# Patient Record
Sex: FEMALE | Race: WHITE | NOT HISPANIC OR LATINO | Employment: OTHER | URBAN - NONMETROPOLITAN AREA
[De-identification: names, ages, dates, MRNs, and addresses within clinical notes are randomized per-mention and may not be internally consistent; named-entity substitution may affect disease eponyms.]

---

## 2017-01-12 ENCOUNTER — TELEPHONE (OUTPATIENT)
Dept: CARDIOLOGY | Facility: CLINIC | Age: 67
End: 2017-01-12

## 2017-01-12 DIAGNOSIS — Z79.899 ON LONG TERM DRUG THERAPY: Primary | ICD-10-CM

## 2017-01-12 RX ORDER — SPIRONOLACTONE 50 MG/1
50 TABLET, FILM COATED ORAL DAILY
Qty: 30 TABLET | Refills: 2 | Status: SHIPPED | OUTPATIENT
Start: 2017-01-12 | End: 2017-02-02

## 2017-01-12 NOTE — TELEPHONE ENCOUNTER
Patient contacted the office stating she has been taking aldactone 25 mg bid instead of daily. She states it seems to work better for her. Dr. lopez will increase aldactone to 50 mg 1 po daily. Ms. Baez was instructed to have a bmp in 1 week.

## 2017-01-19 LAB
ANION GAP SERPL CALCULATED.3IONS-SCNC: 9 MMOL/L (ref 5–15)
BUN SERPL-MCNC: 20 MG/DL (ref 7–21)
CALCIUM SERPL-MCNC: 9.2 MG/DL (ref 8.4–10.2)
CHLORIDE SERPL-SCNC: 93 MMOL/L (ref 95–110)
CO2 SERPL-SCNC: 30 MMOL/L (ref 22–31)
CREAT SERPL-MCNC: 0.8 MG/DL (ref 0.5–1)
GLUCOSE SERPL-MCNC: 90 MG/DL (ref 60–100)
POTASSIUM SERPL-SCNC: 5 MMOL/L (ref 3.5–5.1)
SODIUM SERPL-SCNC: 132 MMOL/L (ref 137–145)

## 2017-02-02 ENCOUNTER — OFFICE VISIT (OUTPATIENT)
Dept: CARDIOLOGY | Facility: CLINIC | Age: 67
End: 2017-02-02

## 2017-02-02 VITALS
OXYGEN SATURATION: 96 % | DIASTOLIC BLOOD PRESSURE: 82 MMHG | HEIGHT: 65 IN | SYSTOLIC BLOOD PRESSURE: 128 MMHG | WEIGHT: 146.1 LBS | BODY MASS INDEX: 24.34 KG/M2

## 2017-02-02 DIAGNOSIS — I10 ESSENTIAL HYPERTENSION: Primary | ICD-10-CM

## 2017-02-02 PROCEDURE — 99213 OFFICE O/P EST LOW 20 MIN: CPT | Performed by: INTERNAL MEDICINE

## 2017-02-02 RX ORDER — SPIRONOLACTONE 25 MG/1
25 TABLET ORAL DAILY
COMMUNITY
End: 2017-08-23 | Stop reason: SINTOL

## 2017-02-02 NOTE — PROGRESS NOTES
Cardiovascular Medicine   Nicola Carrillo M.D., Ph.D., [facility]          Hypertension       This is a 66 y.o. female with:      1. HTN    HTN  Concerning the patient's hypertension, the patient is currently taking Diltiazem.  The patient is medication compliant.  Denies side effects.  Patient's laboratory evaluations are followed closely by the PCP.  She was tried on Bystolic, Losartan, Lisinopril  And HCTZ. She has had rashes with these medications. Last time, we tried Aldactone. She is now on 25mg. Her BP has been good. She does complain of her hips and bilateral legs.     Review of Systems - History obtained from chart review and the patient  General ROS: negative  Cardiovascular ROS: no chest pain or dyspnea on exertion    family history includes Colon cancer in her other; Diabetes in her other; Heart disease in her other; Hypertension in her other.     reports that she has never smoked. She does not have any smokeless tobacco history on file. She reports that she does not drink alcohol.    Allergies   Allergen Reactions   • Bacitracin    • Benzodiazepines Confusion   • Bystolic [Nebivolol Hcl]    • Hctz [Hydrochlorothiazide]    • Lisinopril    • Losartan    • Flagyl [Metronidazole] Rash   • Sulfa Antibiotics Rash         Current Outpatient Prescriptions:   •  desoximetasone (TOPICORT) 0.25 % cream, , Disp: , Rfl:   •  diltiaZEM (CARDIZEM) 60 MG tablet, Take 1 tablet by mouth 4 (Four) Times a Day., Disp: 120 tablet, Rfl: 3  •  docusate sodium (COLACE) 100 MG capsule, Take 100 mg by mouth every night. Takes 4 cap nightly, Disp: , Rfl:   •  gabapentin (NEURONTIN) 300 MG capsule, , Disp: , Rfl:   •  LORazepam (ATIVAN) 0.5 MG tablet, Take 1 tablet by mouth Every 8 (Eight) Hours As Needed for anxiety., Disp: 30 tablet, Rfl: 2  •  Magnesium 250 MG tablet, Take 4 tablets by mouth., Disp: , Rfl:   •  spironolactone (ALDACTONE) 25 MG tablet, Take 25 mg by mouth Daily., Disp: , Rfl:     Physical Exam:  Vitals:     02/02/17 1322   BP: 128/82   SpO2: 96%     Body mass index is 24.31 kg/(m^2).    GEN: alert, appears stated age and cooperative  Body Habitus: well-nourished  HEENT: Head: Normocephalic, no lesions, without obvious abnormality.  Neck / Thyroid: Supple, no masses, nodes, nodules or enlargement. No arcus senilis, xanthelasma or xanthomas. PERRL. Normal external ears. No drainage. No thyromegaly. Neck supple. No LAD. Trachea midline. Nose, normal.  JVP: 6 cm of water at 45 degrees HJR: absent      Carotid:  Upstroke: easily palpated bilaterally Volume: Normal.    Carotid Bruit:  None  Subclavian Bruit: Not present.    Lymph: No overt LAD.   Back: Normal.  Chest:  Normal Excursion: Good    I:E: Good  Pulmonary:clear to auscultation, no wheezes, rales or rhonchi, symmetric air entry. Equal chest excursion. Chest physical exam is normal. No tenderness.        Precordium:  No palpable heaves or thrusts. P2 is not palpable.   [place]:  normal size and placement Palpable S4: Not present.   Heart rate: normal  Heart Rhythm: regular     Heart Sounds: S1: normal intensity  S2: normal intensity  S3: absent   S4: absent  Opening Snap: absent  A2-OS:  N/A  Pericardial rub: absent    Ejection click: None      Murmurs: Systolic: none  Diastolic: none    DATA REVIEWED:   EKG: normal EKG, normal sinus rhythm, dated today, interpreted today.    TTE, 2016  Normal LVEF, normal RV. PASP is normal.     Sodium 137 - 145 mmol/L 132 (L)   Potassium 3.5 - 5.1 mmol/L 5.0   Chloride 95 - 110 mmol/L 93 (L)   CO2 22 - 31 mmol/L 30   Anion Gap 5.0 - 15.0 mmol/L 9.0   Glucose 60 - 100 mg/dl 90   BUN 7 - 21 mg/dl 20   Creatinine 0.5 - 1.0 mg/dl 0.8   GFR MDRD Non African American 45 - 104 mL/min/1.73 sq.M 72         Assessment/Plan     1. HTN, essential.  HD BP noted to be well controlled today in office.    Continue current medications  Aldactone    2. Cardiac Risk Assessment: 11%  Consideration can be made for aspirin and statin for primary  prevention    3. Tobacco status: Life long non-smoker    4. Follow-up: PCP

## 2017-02-09 ENCOUNTER — OFFICE VISIT (OUTPATIENT)
Dept: FAMILY MEDICINE CLINIC | Facility: CLINIC | Age: 67
End: 2017-02-09

## 2017-02-09 VITALS
DIASTOLIC BLOOD PRESSURE: 92 MMHG | SYSTOLIC BLOOD PRESSURE: 130 MMHG | WEIGHT: 145 LBS | BODY MASS INDEX: 24.16 KG/M2 | OXYGEN SATURATION: 98 % | HEIGHT: 65 IN | HEART RATE: 102 BPM

## 2017-02-09 DIAGNOSIS — M79.605 PAIN IN BOTH LOWER EXTREMITIES: ICD-10-CM

## 2017-02-09 DIAGNOSIS — M79.604 PAIN IN BOTH LOWER EXTREMITIES: ICD-10-CM

## 2017-02-09 DIAGNOSIS — I10 ESSENTIAL HYPERTENSION: Primary | ICD-10-CM

## 2017-02-09 DIAGNOSIS — Z11.59 NEED FOR HEPATITIS C SCREENING TEST: ICD-10-CM

## 2017-02-09 PROCEDURE — 99213 OFFICE O/P EST LOW 20 MIN: CPT | Performed by: GENERAL PRACTICE

## 2017-02-09 NOTE — PROGRESS NOTES
Subjective   Angella Baez is a 66 y.o. female.     Chief Complaint   Patient presents with   • Hypertension     bp issues     Saw Dr. Carrillo and cardiac work-up was normal. He has adjusted her blood pressure medications and it is doing better but started having burning leg pain which she thought was due to aldactone, decreased to 1/2 tab and is tolerating.     Hypertension   This is a chronic problem. The current episode started more than 1 year ago. The problem has been gradually improving since onset. The problem is controlled. Associated symptoms include anxiety. Pertinent negatives include no chest pain, headaches, neck pain, palpitations or shortness of breath. Past treatments include calcium channel blockers and diuretics. The current treatment provides significant improvement. There are no compliance problems.       The following portions of the patient's history were reviewed and updated as appropriate: allergies, current medications, past family and social history and problem list.    Current Outpatient Prescriptions:   •  desoximetasone (TOPICORT) 0.25 % cream, , Disp: , Rfl:   •  diltiaZEM (CARDIZEM) 60 MG tablet, Take 1 tablet by mouth 4 (Four) Times a Day., Disp: 120 tablet, Rfl: 3  •  docusate sodium (COLACE) 100 MG capsule, Take 100 mg by mouth every night. Takes 4 cap nightly, Disp: , Rfl:   •  gabapentin (NEURONTIN) 300 MG capsule, Take 300 mg by mouth Every Night. 2 or 3 caps at bedtime according to pain, Disp: , Rfl:   •  LORazepam (ATIVAN) 0.5 MG tablet, Take 1 tablet by mouth Every 8 (Eight) Hours As Needed for anxiety., Disp: 30 tablet, Rfl: 2  •  Magnesium 250 MG tablet, Take 4 tablets by mouth., Disp: , Rfl:   •  spironolactone (ALDACTONE) 25 MG tablet, Take 25 mg by mouth Daily., Disp: , Rfl:     Review of Systems   Constitutional: Negative.  Negative for activity change, appetite change, chills, fatigue, fever and unexpected weight change.   HENT: Negative.  Negative for congestion,  "ear pain, hearing loss, nosebleeds, rhinorrhea, sinus pressure, sneezing, sore throat, tinnitus and trouble swallowing.    Eyes: Negative.  Negative for pain, discharge, redness, itching and visual disturbance.   Respiratory: Negative.  Negative for apnea, cough, chest tightness, shortness of breath and wheezing.    Cardiovascular: Negative.  Negative for chest pain, palpitations and leg swelling.   Gastrointestinal: Negative.  Negative for abdominal distention, abdominal pain, constipation, diarrhea, nausea and vomiting.   Endocrine: Negative.    Genitourinary: Negative.  Negative for dysuria, frequency and urgency.   Musculoskeletal: Negative.  Negative for arthralgias, back pain, gait problem, joint swelling, myalgias, neck pain and neck stiffness.   Skin: Negative.  Negative for color change and rash.   Allergic/Immunologic: Negative.    Neurological: Negative.  Negative for dizziness, weakness, light-headedness, numbness and headaches.   Hematological: Negative.  Negative for adenopathy.   Psychiatric/Behavioral: Negative.  Negative for dysphoric mood and sleep disturbance. The patient is not nervous/anxious.      Objective     Visit Vitals   • /92   • Pulse 102   • Ht 65\" (165.1 cm)   • Wt 145 lb (65.8 kg)   • SpO2 98%   • BMI 24.13 kg/m2     Physical Exam   Constitutional: She is oriented to person, place, and time. She appears well-developed and well-nourished. No distress.   HENT:   Head: Normocephalic and atraumatic.   Nose: Nose normal.   Mouth/Throat: Oropharynx is clear and moist.   Eyes: Conjunctivae and EOM are normal. Pupils are equal, round, and reactive to light. Right eye exhibits no discharge. Left eye exhibits no discharge.   Neck: No thyromegaly present.   Cardiovascular: Normal rate, regular rhythm, normal heart sounds and intact distal pulses.    Pulmonary/Chest: Effort normal and breath sounds normal.   Lymphadenopathy:     She has no cervical adenopathy.   Neurological: She is alert " and oriented to person, place, and time.   Skin: Skin is warm and dry.   Psychiatric: She has a normal mood and affect.   Nursing note and vitals reviewed.    Assessment/Plan   Problem List Items Addressed This Visit        Cardiovascular and Mediastinum    Essential hypertension - Primary    Relevant Orders    Basic Metabolic Panel      Other Visit Diagnoses     Need for hepatitis C screening test        Relevant Orders    Hepatitis C Antibody    Pain in both lower extremities            Continue current treatment. Hopefully will tolerate medications.     No orders of the defined types were placed in this encounter.

## 2017-04-17 RX ORDER — GABAPENTIN 300 MG/1
CAPSULE ORAL
Qty: 180 CAPSULE | Refills: 5 | Status: SHIPPED | OUTPATIENT
Start: 2017-04-17 | End: 2017-08-23 | Stop reason: SDUPTHER

## 2017-04-26 RX ORDER — LORAZEPAM 0.5 MG/1
TABLET ORAL
Qty: 30 TABLET | Refills: 0 | OUTPATIENT
Start: 2017-04-26 | End: 2017-07-17 | Stop reason: SDUPTHER

## 2017-04-27 RX ORDER — DILTIAZEM HYDROCHLORIDE 60 MG/1
60 TABLET, FILM COATED ORAL 4 TIMES DAILY
Qty: 360 TABLET | Refills: 3 | Status: SHIPPED | OUTPATIENT
Start: 2017-04-27 | End: 2017-08-23 | Stop reason: SDUPTHER

## 2017-05-02 ENCOUNTER — LAB (OUTPATIENT)
Dept: LAB | Facility: HOSPITAL | Age: 67
End: 2017-05-02

## 2017-05-02 DIAGNOSIS — Z11.59 NEED FOR HEPATITIS C SCREENING TEST: ICD-10-CM

## 2017-05-02 DIAGNOSIS — I10 ESSENTIAL HYPERTENSION: ICD-10-CM

## 2017-05-02 LAB
ANION GAP SERPL CALCULATED.3IONS-SCNC: 11 MMOL/L (ref 5–15)
BUN BLD-MCNC: 20 MG/DL (ref 7–21)
BUN/CREAT SERPL: 27.4 (ref 7–25)
CALCIUM SPEC-SCNC: 9.2 MG/DL (ref 8.4–10.2)
CHLORIDE SERPL-SCNC: 98 MMOL/L (ref 95–110)
CO2 SERPL-SCNC: 27 MMOL/L (ref 22–31)
CREAT BLD-MCNC: 0.73 MG/DL (ref 0.5–1)
GFR SERPL CREATININE-BSD FRML MDRD: 80 ML/MIN/1.73 (ref 45–104)
GLUCOSE BLD-MCNC: 90 MG/DL (ref 60–100)
HCV AB SER DONR QL: NEGATIVE
POTASSIUM BLD-SCNC: 4 MMOL/L (ref 3.5–5.1)
SODIUM BLD-SCNC: 136 MMOL/L (ref 137–145)

## 2017-05-02 PROCEDURE — 86803 HEPATITIS C AB TEST: CPT | Performed by: GENERAL PRACTICE

## 2017-05-02 PROCEDURE — 36415 COLL VENOUS BLD VENIPUNCTURE: CPT

## 2017-05-02 PROCEDURE — 80048 BASIC METABOLIC PNL TOTAL CA: CPT | Performed by: GENERAL PRACTICE

## 2017-05-08 ENCOUNTER — TELEPHONE (OUTPATIENT)
Dept: FAMILY MEDICINE CLINIC | Facility: CLINIC | Age: 67
End: 2017-05-08

## 2017-07-17 RX ORDER — LORAZEPAM 0.5 MG/1
0.5 TABLET ORAL EVERY 8 HOURS PRN
Qty: 30 TABLET | Refills: 0 | OUTPATIENT
Start: 2017-07-17 | End: 2017-08-23 | Stop reason: SDUPTHER

## 2017-08-23 ENCOUNTER — LAB (OUTPATIENT)
Dept: LAB | Facility: HOSPITAL | Age: 67
End: 2017-08-23

## 2017-08-23 ENCOUNTER — OFFICE VISIT (OUTPATIENT)
Dept: FAMILY MEDICINE CLINIC | Facility: CLINIC | Age: 67
End: 2017-08-23

## 2017-08-23 VITALS
HEIGHT: 65 IN | OXYGEN SATURATION: 99 % | BODY MASS INDEX: 24.36 KG/M2 | DIASTOLIC BLOOD PRESSURE: 92 MMHG | HEART RATE: 77 BPM | WEIGHT: 146.2 LBS | SYSTOLIC BLOOD PRESSURE: 152 MMHG

## 2017-08-23 DIAGNOSIS — M54.50 LOW BACK PAIN RADIATING TO BOTH LEGS: ICD-10-CM

## 2017-08-23 DIAGNOSIS — R20.0 NUMBNESS OF RIGHT FOOT: ICD-10-CM

## 2017-08-23 DIAGNOSIS — M79.604 PAIN IN BOTH LOWER EXTREMITIES: Primary | ICD-10-CM

## 2017-08-23 DIAGNOSIS — M79.605 PAIN IN BOTH LOWER EXTREMITIES: Primary | ICD-10-CM

## 2017-08-23 DIAGNOSIS — M79.604 LOW BACK PAIN RADIATING TO BOTH LEGS: ICD-10-CM

## 2017-08-23 DIAGNOSIS — M79.605 LOW BACK PAIN RADIATING TO BOTH LEGS: ICD-10-CM

## 2017-08-23 LAB
ANION GAP SERPL CALCULATED.3IONS-SCNC: 8 MMOL/L (ref 5–15)
BUN BLD-MCNC: 21 MG/DL (ref 7–21)
BUN/CREAT SERPL: 31.8 (ref 7–25)
CALCIUM SPEC-SCNC: 9.1 MG/DL (ref 8.4–10.2)
CHLORIDE SERPL-SCNC: 93 MMOL/L (ref 95–110)
CO2 SERPL-SCNC: 29 MMOL/L (ref 22–31)
CREAT BLD-MCNC: 0.66 MG/DL (ref 0.5–1)
GFR SERPL CREATININE-BSD FRML MDRD: 90 ML/MIN/1.73 (ref 45–104)
GLUCOSE BLD-MCNC: 87 MG/DL (ref 60–100)
POTASSIUM BLD-SCNC: 4.2 MMOL/L (ref 3.5–5.1)
SODIUM BLD-SCNC: 130 MMOL/L (ref 137–145)
T4 FREE SERPL-MCNC: 0.94 NG/DL (ref 0.78–2.19)
TSH SERPL DL<=0.05 MIU/L-ACNC: 1.03 MIU/ML (ref 0.46–4.68)
VIT B12 BLD-MCNC: 508 PG/ML (ref 239–931)

## 2017-08-23 PROCEDURE — 80048 BASIC METABOLIC PNL TOTAL CA: CPT | Performed by: GENERAL PRACTICE

## 2017-08-23 PROCEDURE — 84443 ASSAY THYROID STIM HORMONE: CPT | Performed by: GENERAL PRACTICE

## 2017-08-23 PROCEDURE — 36415 COLL VENOUS BLD VENIPUNCTURE: CPT

## 2017-08-23 PROCEDURE — 82607 VITAMIN B-12: CPT | Performed by: GENERAL PRACTICE

## 2017-08-23 PROCEDURE — 99214 OFFICE O/P EST MOD 30 MIN: CPT | Performed by: GENERAL PRACTICE

## 2017-08-23 PROCEDURE — 84439 ASSAY OF FREE THYROXINE: CPT | Performed by: GENERAL PRACTICE

## 2017-08-23 RX ORDER — HYDRALAZINE HYDROCHLORIDE 25 MG/1
25 TABLET, FILM COATED ORAL 2 TIMES DAILY
Qty: 60 TABLET | Refills: 2 | Status: SHIPPED | OUTPATIENT
Start: 2017-08-23 | End: 2017-09-12

## 2017-08-23 RX ORDER — DILTIAZEM HYDROCHLORIDE 60 MG/1
60 TABLET, FILM COATED ORAL 2 TIMES DAILY
Qty: 60 TABLET | Refills: 2 | Status: SHIPPED | OUTPATIENT
Start: 2017-08-23 | End: 2017-11-21 | Stop reason: SDUPTHER

## 2017-08-23 RX ORDER — LORAZEPAM 0.5 MG/1
0.5 TABLET ORAL NIGHTLY
Qty: 30 TABLET | Refills: 2 | Status: SHIPPED | OUTPATIENT
Start: 2017-08-23 | End: 2017-09-12

## 2017-08-23 RX ORDER — GABAPENTIN 300 MG/1
600 CAPSULE ORAL NIGHTLY
Qty: 180 CAPSULE | Refills: 0 | Status: SHIPPED | OUTPATIENT
Start: 2017-08-23 | End: 2017-09-12 | Stop reason: SDUPTHER

## 2017-08-23 NOTE — PROGRESS NOTES
Subjective   Angella Baez is a 67 y.o. female.   Chief Complaint   Patient presents with   • Med Refill   • Hypertension   • Leg Pain     both   • Foot Pain     both   • Hip Pain     both     Thinks blood pressure med is causing burning in her feet as well as a rash, has been on multiple different medications and has not tolerated them well. Also wondering whether back or disc problems could be causing her leg pain, does have numbness in her lateral right toes. Also has had a little dizziness.   Hypertension   This is a chronic problem. The current episode started more than 1 year ago. The problem is unchanged. The problem is controlled. Pertinent negatives include no chest pain, headaches, neck pain, palpitations or shortness of breath. There are no associated agents to hypertension. Past treatments include calcium channel blockers. The current treatment provides moderate improvement. There are no compliance problems.       The following portions of the patient's history were reviewed and updated as appropriate: allergies, current medications, past social history and problem list.    Outpatient Medications Prior to Visit   Medication Sig Dispense Refill   • desoximetasone (TOPICORT) 0.25 % cream      • docusate sodium (COLACE) 100 MG capsule Take 100 mg by mouth every night. Takes 4 cap nightly     • Magnesium 250 MG tablet Take 4 tablets by mouth.     • diltiaZEM (CARDIZEM) 60 MG tablet Take 1 tablet by mouth 4 (Four) Times a Day. 360 tablet 3   • gabapentin (NEURONTIN) 300 MG capsule TAKE TWO CAPSULES BY MOUTH THREE TIMES A  capsule 5   • LORazepam (ATIVAN) 0.5 MG tablet Take 1 tablet by mouth Every 8 (Eight) Hours As Needed for Anxiety. 30 tablet 0   • spironolactone (ALDACTONE) 25 MG tablet Take 25 mg by mouth Daily.       No facility-administered medications prior to visit.        Review of Systems   Constitutional: Negative.  Negative for chills, fatigue, fever and unexpected weight change.   HENT:  "Negative.  Negative for congestion, ear pain, hearing loss, nosebleeds, rhinorrhea, sneezing, sore throat and tinnitus.    Eyes: Negative.  Negative for discharge.   Respiratory: Negative.  Negative for cough, shortness of breath and wheezing.    Cardiovascular: Negative.  Negative for chest pain and palpitations.   Gastrointestinal: Negative.  Negative for abdominal pain, constipation, diarrhea, nausea and vomiting.   Endocrine: Negative.    Genitourinary: Negative.  Negative for dysuria, frequency and urgency.   Musculoskeletal: Positive for myalgias. Negative for arthralgias, back pain, joint swelling and neck pain.   Skin: Negative.  Negative for rash.   Allergic/Immunologic: Negative.    Neurological: Positive for dizziness. Negative for weakness, numbness and headaches.   Hematological: Negative.  Negative for adenopathy.   Psychiatric/Behavioral: Negative.  Negative for dysphoric mood and sleep disturbance. The patient is not nervous/anxious.        Objective   Visit Vitals   • /92   • Pulse 77   • Ht 65\" (165.1 cm)   • Wt 146 lb 3.2 oz (66.3 kg)   • SpO2 99%   • BMI 24.33 kg/m2     Physical Exam   Constitutional: She is oriented to person, place, and time. She appears well-developed and well-nourished. No distress.   HENT:   Head: Normocephalic and atraumatic.   Nose: Nose normal.   Mouth/Throat: Oropharynx is clear and moist.   Eyes: Conjunctivae and EOM are normal. Pupils are equal, round, and reactive to light. Right eye exhibits no discharge. Left eye exhibits no discharge.   Neck: No thyromegaly present.   Cardiovascular: Normal rate, regular rhythm, normal heart sounds and intact distal pulses.    Pulmonary/Chest: Effort normal and breath sounds normal.   Musculoskeletal:   Straight leg raise 90 bilat   Lymphadenopathy:     She has no cervical adenopathy.   Neurological: She is alert and oriented to person, place, and time.   Skin: Skin is warm and dry.   Psychiatric: She has a normal mood and " affect.   Nursing note and vitals reviewed.    Assessment/Plan   Problem List Items Addressed This Visit     None      Visit Diagnoses     Pain in both lower extremities    -  Primary    Relevant Orders    XR Spine Lumbar 2 or 3 View (Completed)    MRI Lumbar Spine Without Contrast    Numbness of right foot        Relevant Orders    XR Spine Lumbar 2 or 3 View (Completed)    MRI Lumbar Spine Without Contrast    Basic Metabolic Panel (Completed)    Vitamin B12 (Completed)    TSH (Completed)    T4, Free (Completed)    Low back pain radiating to both legs        Relevant Orders    XR Spine Lumbar 2 or 3 View (Completed)    MRI Lumbar Spine Without Contrast          Will notify regarding results. Decrease diltiazem and start hydralazine. [place] reviewed and appropriate. Not recommended to drive or operate heavy equipment while taking potentially sedating meds.  Patient understands the risks associated with this controlled medication, including tolerance and addiction. They also agree to obtain this medication only from me, and not from a another provider, unless that provider is covering for me in my absence. They also agree to be compliant in dosing, and not self adjust the dose of medication.  A signed controlled substance agreement is on file, and they have received a controlled substance education sheet at this or a previous visit. They have also signed a consent for treatment with a controlled substance as per [facility] policy.     New Medications Ordered This Visit   Medications   • hydrALAZINE (APRESOLINE) 25 MG tablet     Sig: Take 1 tablet by mouth 2 (Two) Times a Day.     Dispense:  60 tablet     Refill:  2   • gabapentin (NEURONTIN) 300 MG capsule     Sig: Take 2 capsules by mouth Every Night.     Dispense:  180 capsule     Refill:  0   • LORazepam (ATIVAN) 0.5 MG tablet     Sig: Take 1 tablet by mouth Every Night.     Dispense:  30 tablet     Refill:  2   • diltiaZEM (CARDIZEM) 60 MG tablet     Sig:  Take 1 tablet by mouth 2 (Two) Times a Day.     Dispense:  60 tablet     Refill:  2     Return in about 3 weeks (around 9/13/2017) for Recheck.

## 2017-08-31 ENCOUNTER — TELEPHONE (OUTPATIENT)
Dept: FAMILY MEDICINE CLINIC | Facility: CLINIC | Age: 67
End: 2017-08-31

## 2017-09-05 ENCOUNTER — TRANSCRIBE ORDERS (OUTPATIENT)
Dept: LAB | Facility: HOSPITAL | Age: 67
End: 2017-09-05

## 2017-09-05 ENCOUNTER — LAB (OUTPATIENT)
Dept: LAB | Facility: HOSPITAL | Age: 67
End: 2017-09-05

## 2017-09-05 DIAGNOSIS — L93.0 LUPUS ERYTHEMATOSUS: ICD-10-CM

## 2017-09-05 DIAGNOSIS — L93.0 LUPUS ERYTHEMATOSUS: Primary | ICD-10-CM

## 2017-09-05 LAB
ALBUMIN SERPL-MCNC: 4.3 G/DL (ref 3.4–4.8)
ALBUMIN/GLOB SERPL: 1.5 G/DL (ref 1.1–1.8)
ALP SERPL-CCNC: 69 U/L (ref 38–126)
ALT SERPL W P-5'-P-CCNC: 43 U/L (ref 9–52)
ANION GAP SERPL CALCULATED.3IONS-SCNC: 8 MMOL/L (ref 5–15)
AST SERPL-CCNC: 31 U/L (ref 14–36)
BASOPHILS # BLD AUTO: 0.02 10*3/MM3 (ref 0–0.2)
BASOPHILS NFR BLD AUTO: 0.5 % (ref 0–2)
BILIRUB SERPL-MCNC: 0.5 MG/DL (ref 0.2–1.3)
BUN BLD-MCNC: 22 MG/DL (ref 7–21)
BUN/CREAT SERPL: 31 (ref 7–25)
CALCIUM SPEC-SCNC: 9 MG/DL (ref 8.4–10.2)
CHLORIDE SERPL-SCNC: 93 MMOL/L (ref 95–110)
CO2 SERPL-SCNC: 29 MMOL/L (ref 22–31)
CREAT BLD-MCNC: 0.71 MG/DL (ref 0.5–1)
DEPRECATED RDW RBC AUTO: 40.3 FL (ref 36.4–46.3)
EOSINOPHIL # BLD AUTO: 0.14 10*3/MM3 (ref 0–0.7)
EOSINOPHIL NFR BLD AUTO: 3.3 % (ref 0–7)
ERYTHROCYTE [DISTWIDTH] IN BLOOD BY AUTOMATED COUNT: 12.5 % (ref 11.5–14.5)
ERYTHROCYTE [SEDIMENTATION RATE] IN BLOOD: 13 MM/HR (ref 0–20)
GFR SERPL CREATININE-BSD FRML MDRD: 83 ML/MIN/1.73 (ref 45–104)
GLOBULIN UR ELPH-MCNC: 2.8 GM/DL (ref 2.3–3.5)
GLUCOSE BLD-MCNC: 92 MG/DL (ref 60–100)
HCT VFR BLD AUTO: 37.5 % (ref 35–45)
HGB BLD-MCNC: 12.8 G/DL (ref 12–15.5)
IMM GRANULOCYTES # BLD: 0.04 10*3/MM3 (ref 0–0.02)
IMM GRANULOCYTES NFR BLD: 0.9 % (ref 0–0.5)
LYMPHOCYTES # BLD AUTO: 0.75 10*3/MM3 (ref 0.6–4.2)
LYMPHOCYTES NFR BLD AUTO: 17.7 % (ref 10–50)
MCH RBC QN AUTO: 30.5 PG (ref 26.5–34)
MCHC RBC AUTO-ENTMCNC: 34.1 G/DL (ref 31.4–36)
MCV RBC AUTO: 89.3 FL (ref 80–98)
MONOCYTES # BLD AUTO: 0.45 10*3/MM3 (ref 0–0.9)
MONOCYTES NFR BLD AUTO: 10.6 % (ref 0–12)
NEUTROPHILS # BLD AUTO: 2.83 10*3/MM3 (ref 2–8.6)
NEUTROPHILS NFR BLD AUTO: 67 % (ref 37–80)
PLATELET # BLD AUTO: 244 10*3/MM3 (ref 150–450)
PMV BLD AUTO: 10.3 FL (ref 8–12)
POTASSIUM BLD-SCNC: 4.3 MMOL/L (ref 3.5–5.1)
PROT SERPL-MCNC: 7.1 G/DL (ref 6.3–8.6)
RBC # BLD AUTO: 4.2 10*6/MM3 (ref 3.77–5.16)
SODIUM BLD-SCNC: 130 MMOL/L (ref 137–145)
WBC NRBC COR # BLD: 4.23 10*3/MM3 (ref 3.2–9.8)

## 2017-09-05 PROCEDURE — 85025 COMPLETE CBC W/AUTO DIFF WBC: CPT | Performed by: NURSE PRACTITIONER

## 2017-09-05 PROCEDURE — 80053 COMPREHEN METABOLIC PANEL: CPT | Performed by: NURSE PRACTITIONER

## 2017-09-05 PROCEDURE — 36415 COLL VENOUS BLD VENIPUNCTURE: CPT

## 2017-09-05 PROCEDURE — 86038 ANTINUCLEAR ANTIBODIES: CPT | Performed by: NURSE PRACTITIONER

## 2017-09-05 PROCEDURE — 85651 RBC SED RATE NONAUTOMATED: CPT | Performed by: NURSE PRACTITIONER

## 2017-09-06 LAB
ANA SER QL IA: POSITIVE
ANA SPECKLED TITR SER: NORMAL {TITER}
Lab: NORMAL

## 2017-09-12 ENCOUNTER — OFFICE VISIT (OUTPATIENT)
Dept: FAMILY MEDICINE CLINIC | Facility: CLINIC | Age: 67
End: 2017-09-12

## 2017-09-12 VITALS
HEIGHT: 65 IN | DIASTOLIC BLOOD PRESSURE: 86 MMHG | WEIGHT: 144.9 LBS | OXYGEN SATURATION: 98 % | HEART RATE: 84 BPM | BODY MASS INDEX: 24.14 KG/M2 | SYSTOLIC BLOOD PRESSURE: 132 MMHG

## 2017-09-12 DIAGNOSIS — M79.7 PRIMARY FIBROMYALGIA SYNDROME: Chronic | ICD-10-CM

## 2017-09-12 DIAGNOSIS — I10 ESSENTIAL HYPERTENSION: Primary | Chronic | ICD-10-CM

## 2017-09-12 DIAGNOSIS — G47.00 INSOMNIA, UNSPECIFIED TYPE: Chronic | ICD-10-CM

## 2017-09-12 DIAGNOSIS — E55.9 VITAMIN D DEFICIENCY: ICD-10-CM

## 2017-09-12 PROCEDURE — 99214 OFFICE O/P EST MOD 30 MIN: CPT | Performed by: GENERAL PRACTICE

## 2017-09-12 RX ORDER — LORAZEPAM 1 MG/1
1 TABLET ORAL NIGHTLY
Qty: 30 TABLET | Refills: 2 | Status: SHIPPED | OUTPATIENT
Start: 2017-09-12 | End: 2017-11-21 | Stop reason: SDUPTHER

## 2017-09-12 RX ORDER — GABAPENTIN 300 MG/1
300 CAPSULE ORAL 3 TIMES DAILY
Qty: 270 CAPSULE | Refills: 0 | Status: SHIPPED | OUTPATIENT
Start: 2017-09-12 | End: 2017-11-21 | Stop reason: SDUPTHER

## 2017-09-12 RX ORDER — SPIRONOLACTONE 25 MG/1
25 TABLET ORAL DAILY
COMMUNITY
End: 2017-11-21 | Stop reason: SDUPTHER

## 2017-09-12 NOTE — PROGRESS NOTES
Subjective   Angella Baez is a 67 y.o. female.   Chief Complaint   Patient presents with   • Follow-up   • Hypertension     For review and evaluation of management of chronic medical problems. Could not tolerate hydralazine as developed a rash, started back on spironolactone as seems to be tolerating. Did not tolerate lower dose of lorazepam and  is having to take 3 gabapentin.  Hypertension   This is a chronic problem. The current episode started more than 1 year ago. The problem is unchanged. The problem is controlled. Pertinent negatives include no chest pain, headaches, neck pain, palpitations or shortness of breath. There are no associated agents to hypertension. Past treatments include calcium channel blockers and diuretics. The current treatment provides moderate improvement. There are no compliance problems.       The following portions of the patient's history were reviewed and updated as appropriate: allergies, current medications, past social history and problem list.    Outpatient Medications Prior to Visit   Medication Sig Dispense Refill   • desoximetasone (TOPICORT) 0.25 % cream      • diltiaZEM (CARDIZEM) 60 MG tablet Take 1 tablet by mouth 2 (Two) Times a Day. 60 tablet 2   • docusate sodium (COLACE) 100 MG capsule Take 100 mg by mouth every night. Takes 4 cap nightly     • Magnesium 250 MG tablet Take 4 tablets by mouth.     • gabapentin (NEURONTIN) 300 MG capsule Take 2 capsules by mouth Every Night. 180 capsule 0   • hydrALAZINE (APRESOLINE) 25 MG tablet Take 1 tablet by mouth 2 (Two) Times a Day. 60 tablet 2   • LORazepam (ATIVAN) 0.5 MG tablet Take 1 tablet by mouth Every Night. 30 tablet 2     No facility-administered medications prior to visit.        Review of Systems   Constitutional: Negative.  Negative for chills, fatigue, fever and unexpected weight change.   HENT: Negative.  Negative for congestion, ear pain, hearing loss, nosebleeds, rhinorrhea, sneezing, sore throat and tinnitus.   "  Eyes: Negative.  Negative for discharge.   Respiratory: Negative.  Negative for cough, shortness of breath and wheezing.    Cardiovascular: Negative.  Negative for chest pain and palpitations.   Gastrointestinal: Negative.  Negative for abdominal pain, constipation, diarrhea, nausea and vomiting.   Endocrine: Negative.    Genitourinary: Negative.  Negative for dysuria, frequency and urgency.   Musculoskeletal: Negative.  Negative for arthralgias, back pain, joint swelling, myalgias and neck pain.   Skin: Negative.  Negative for rash.   Allergic/Immunologic: Negative.    Neurological: Negative.  Negative for dizziness, weakness, numbness and headaches.   Hematological: Negative.  Negative for adenopathy.   Psychiatric/Behavioral: Negative.  Negative for dysphoric mood and sleep disturbance. The patient is not nervous/anxious.        Objective   Visit Vitals   • /86 (BP Location: Right arm, Patient Position: Lying)   • Pulse 84   • Ht 65\" (165.1 cm)   • Wt 144 lb 14.4 oz (65.7 kg)   • SpO2 98%   • BMI 24.11 kg/m2     Physical Exam   Constitutional: She is oriented to person, place, and time. She appears well-developed and well-nourished. No distress.   HENT:   Head: Normocephalic and atraumatic.   Nose: Nose normal.   Mouth/Throat: Oropharynx is clear and moist.   Eyes: Conjunctivae and EOM are normal. Pupils are equal, round, and reactive to light. Right eye exhibits no discharge. Left eye exhibits no discharge.   Neck: No thyromegaly present.   Cardiovascular: Normal rate, regular rhythm, normal heart sounds and intact distal pulses.    Pulmonary/Chest: Effort normal and breath sounds normal.   Lymphadenopathy:     She has no cervical adenopathy.   Neurological: She is alert and oriented to person, place, and time.   Skin: Skin is warm and dry.   Psychiatric: She has a normal mood and affect.   Nursing note and vitals reviewed.      Assessment/Plan   Problem List Items Addressed This Visit        " Cardiovascular and Mediastinum    Essential hypertension - Primary (Chronic)    Relevant Medications    spironolactone (ALDACTONE) 25 MG tablet       Musculoskeletal and Integument    Primary fibromyalgia syndrome (Chronic)       Other    Insomnia (Chronic)         Increase lorazepam and gabapentin. Continue to montior blood pressure. Follow up as scheduled.     New Medications Ordered This Visit   Medications   • spironolactone (ALDACTONE) 25 MG tablet     Sig: Take 25 mg by mouth Daily.   • gabapentin (NEURONTIN) 300 MG capsule     Sig: Take 1 capsule by mouth 3 (Three) Times a Day.     Dispense:  270 capsule     Refill:  0   • LORazepam (ATIVAN) 1 MG tablet     Sig: Take 1 tablet by mouth Every Night.     Dispense:  30 tablet     Refill:  2     No Follow-up on file.

## 2017-11-05 ENCOUNTER — APPOINTMENT (OUTPATIENT)
Dept: GENERAL RADIOLOGY | Facility: HOSPITAL | Age: 67
End: 2017-11-05

## 2017-11-05 ENCOUNTER — HOSPITAL ENCOUNTER (EMERGENCY)
Facility: HOSPITAL | Age: 67
Discharge: HOME | End: 2017-11-05
Attending: FAMILY MEDICINE | Admitting: FAMILY MEDICINE

## 2017-11-05 VITALS
HEART RATE: 78 BPM | SYSTOLIC BLOOD PRESSURE: 177 MMHG | BODY MASS INDEX: 22.98 KG/M2 | HEIGHT: 66 IN | DIASTOLIC BLOOD PRESSURE: 69 MMHG | OXYGEN SATURATION: 97 % | WEIGHT: 143 LBS | RESPIRATION RATE: 18 BRPM | TEMPERATURE: 98.2 F
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
## 2017-11-05 DIAGNOSIS — S60.211A CONTUSION OF RIGHT WRIST, INITIAL ENCOUNTER: ICD-10-CM

## 2017-11-05 DIAGNOSIS — S80.11XA CONTUSION OF RIGHT LOWER EXTREMITY, INITIAL ENCOUNTER: ICD-10-CM

## 2017-11-05 PROCEDURE — 93005 ELECTROCARDIOGRAM TRACING: CPT

## 2017-11-05 PROCEDURE — 25010000002 TDAP 5-2.5-18.5 LF-MCG/0.5 SUSPENSION: Performed by: FAMILY MEDICINE

## 2017-11-05 PROCEDURE — 73590 X-RAY EXAM OF LOWER LEG: CPT

## 2017-11-05 PROCEDURE — 93010 ELECTROCARDIOGRAM REPORT: CPT | Performed by: INTERNAL MEDICINE

## 2017-11-05 PROCEDURE — 90471 IMMUNIZATION ADMIN: CPT | Performed by: FAMILY MEDICINE

## 2017-11-05 PROCEDURE — 99283 EMERGENCY DEPT VISIT LOW MDM: CPT

## 2017-11-05 PROCEDURE — 90715 TDAP VACCINE 7 YRS/> IM: CPT | Performed by: FAMILY MEDICINE

## 2017-11-05 RX ADMIN — TETANUS TOXOID, REDUCED DIPHTHERIA TOXOID AND ACELLULAR PERTUSSIS VACCINE, ADSORBED 0.5 ML: 5; 2.5; 8; 8; 2.5 SUSPENSION INTRAMUSCULAR at 21:12
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
## 2017-11-15 ENCOUNTER — TELEPHONE (OUTPATIENT)
Dept: FAMILY MEDICINE CLINIC | Facility: CLINIC | Age: 67
End: 2017-11-15

## 2017-11-17 ENCOUNTER — LAB (OUTPATIENT)
Dept: LAB | Facility: HOSPITAL | Age: 67
End: 2017-11-17

## 2017-11-17 DIAGNOSIS — E55.9 VITAMIN D DEFICIENCY: ICD-10-CM

## 2017-11-17 DIAGNOSIS — I10 ESSENTIAL HYPERTENSION: ICD-10-CM

## 2017-11-17 LAB
25(OH)D3 SERPL-MCNC: 44.3 NG/ML (ref 30–100)
ALBUMIN SERPL-MCNC: 4.6 G/DL (ref 3.4–4.8)
ALBUMIN/GLOB SERPL: 1.4 G/DL (ref 1.1–1.8)
ALP SERPL-CCNC: 70 U/L (ref 38–126)
ALT SERPL W P-5'-P-CCNC: 30 U/L (ref 9–52)
ANION GAP SERPL CALCULATED.3IONS-SCNC: 11 MMOL/L (ref 5–15)
ARTICHOKE IGE QN: 144 MG/DL (ref 1–129)
AST SERPL-CCNC: 35 U/L (ref 14–36)
BACTERIA UR QL AUTO: NORMAL /HPF
BILIRUB SERPL-MCNC: 0.4 MG/DL (ref 0.2–1.3)
BILIRUB UR QL STRIP: NEGATIVE
BUN BLD-MCNC: 24 MG/DL (ref 7–21)
BUN/CREAT SERPL: 30.8 (ref 7–25)
CALCIUM SPEC-SCNC: 9.3 MG/DL (ref 8.4–10.2)
CHLORIDE SERPL-SCNC: 94 MMOL/L (ref 95–110)
CHOLEST SERPL-MCNC: 231 MG/DL (ref 0–199)
CLARITY UR: CLEAR
CO2 SERPL-SCNC: 27 MMOL/L (ref 22–31)
COLOR UR: YELLOW
CREAT BLD-MCNC: 0.78 MG/DL (ref 0.5–1)
GFR SERPL CREATININE-BSD FRML MDRD: 74 ML/MIN/1.73 (ref 45–104)
GLOBULIN UR ELPH-MCNC: 3.3 GM/DL (ref 2.3–3.5)
GLUCOSE BLD-MCNC: 94 MG/DL (ref 60–100)
GLUCOSE UR STRIP-MCNC: NEGATIVE MG/DL
HDLC SERPL-MCNC: 54 MG/DL (ref 60–200)
HGB UR QL STRIP.AUTO: NEGATIVE
HYALINE CASTS UR QL AUTO: NORMAL /LPF
KETONES UR QL STRIP: NEGATIVE
LDLC/HDLC SERPL: 2.89 {RATIO} (ref 0–3.22)
LEUKOCYTE ESTERASE UR QL STRIP.AUTO: NEGATIVE
NITRITE UR QL STRIP: NEGATIVE
PH UR STRIP.AUTO: 6 [PH] (ref 5–9)
POTASSIUM BLD-SCNC: 4.7 MMOL/L (ref 3.5–5.1)
PROT SERPL-MCNC: 7.9 G/DL (ref 6.3–8.6)
PROT UR QL STRIP: NEGATIVE
RBC # UR: NORMAL /HPF
REF LAB TEST METHOD: NORMAL
SODIUM BLD-SCNC: 132 MMOL/L (ref 137–145)
SP GR UR STRIP: 1.02 (ref 1–1.03)
SQUAMOUS #/AREA URNS HPF: NORMAL /HPF
TRIGL SERPL-MCNC: 106 MG/DL (ref 20–199)
UROBILINOGEN UR QL STRIP: NORMAL
WBC UR QL AUTO: NORMAL /HPF

## 2017-11-17 PROCEDURE — 80053 COMPREHEN METABOLIC PANEL: CPT | Performed by: GENERAL PRACTICE

## 2017-11-17 PROCEDURE — 81001 URINALYSIS AUTO W/SCOPE: CPT | Performed by: GENERAL PRACTICE

## 2017-11-17 PROCEDURE — 80061 LIPID PANEL: CPT | Performed by: GENERAL PRACTICE

## 2017-11-17 PROCEDURE — 82306 VITAMIN D 25 HYDROXY: CPT | Performed by: GENERAL PRACTICE

## 2017-11-17 PROCEDURE — 36415 COLL VENOUS BLD VENIPUNCTURE: CPT

## 2017-11-20 DIAGNOSIS — Z12.31 ENCOUNTER FOR SCREENING MAMMOGRAM FOR MALIGNANT NEOPLASM OF BREAST: Primary | ICD-10-CM

## 2017-11-21 ENCOUNTER — OFFICE VISIT (OUTPATIENT)
Dept: FAMILY MEDICINE CLINIC | Facility: CLINIC | Age: 67
End: 2017-11-21

## 2017-11-21 VITALS
HEIGHT: 66 IN | WEIGHT: 148.9 LBS | DIASTOLIC BLOOD PRESSURE: 84 MMHG | OXYGEN SATURATION: 98 % | BODY MASS INDEX: 23.93 KG/M2 | SYSTOLIC BLOOD PRESSURE: 142 MMHG | HEART RATE: 91 BPM

## 2017-11-21 DIAGNOSIS — M81.0 POSTMENOPAUSAL BONE LOSS: ICD-10-CM

## 2017-11-21 DIAGNOSIS — G47.00 INSOMNIA, UNSPECIFIED TYPE: Chronic | ICD-10-CM

## 2017-11-21 DIAGNOSIS — I10 ESSENTIAL HYPERTENSION: Chronic | ICD-10-CM

## 2017-11-21 DIAGNOSIS — M79.7 PRIMARY FIBROMYALGIA SYNDROME: Chronic | ICD-10-CM

## 2017-11-21 DIAGNOSIS — Z00.00 MEDICARE ANNUAL WELLNESS VISIT, INITIAL: Primary | ICD-10-CM

## 2017-11-21 DIAGNOSIS — E78.2 MIXED HYPERLIPIDEMIA: ICD-10-CM

## 2017-11-21 DIAGNOSIS — Z12.11 SCREEN FOR COLON CANCER: ICD-10-CM

## 2017-11-21 DIAGNOSIS — Z13.820 ENCOUNTER FOR SCREENING FOR OSTEOPOROSIS: ICD-10-CM

## 2017-11-21 PROCEDURE — G0402 INITIAL PREVENTIVE EXAM: HCPCS | Performed by: GENERAL PRACTICE

## 2017-11-21 PROCEDURE — 99214 OFFICE O/P EST MOD 30 MIN: CPT | Performed by: GENERAL PRACTICE

## 2017-11-21 RX ORDER — LORAZEPAM 1 MG/1
1 TABLET ORAL NIGHTLY
Qty: 90 TABLET | Refills: 0 | Status: SHIPPED | OUTPATIENT
Start: 2017-11-21 | End: 2018-02-22 | Stop reason: SDUPTHER

## 2017-11-21 RX ORDER — SPIRONOLACTONE 25 MG/1
25 TABLET ORAL DAILY
Qty: 90 TABLET | Refills: 3 | Status: SHIPPED | OUTPATIENT
Start: 2017-11-21 | End: 2018-05-24 | Stop reason: SDUPTHER

## 2017-11-21 RX ORDER — DILTIAZEM HYDROCHLORIDE 60 MG/1
60 TABLET, FILM COATED ORAL 2 TIMES DAILY
Qty: 180 TABLET | Refills: 3 | Status: SHIPPED | OUTPATIENT
Start: 2017-11-21 | End: 2018-12-03 | Stop reason: SDUPTHER

## 2017-11-21 RX ORDER — GABAPENTIN 300 MG/1
CAPSULE ORAL
Qty: 360 CAPSULE | Refills: 0 | Status: SHIPPED | OUTPATIENT
Start: 2017-11-21 | End: 2018-08-30 | Stop reason: SDUPTHER

## 2017-11-21 NOTE — PATIENT INSTRUCTIONS
Medicare Wellness  Personal Prevention Plan of Service     Date of Office Visit:  2017  Encounter Provider:  Anay Thomson MD  Place of Service:  [facility]  Patient Name: Angella Baez  :  1950    As part of the Medicare Wellness portion of your visit today, we are providing you with this personalized preventive plan of services (PPPS). This plan is based upon recommendations of the [place] Preventive Services Task Force (USPSTF) and the Advisory Committee on Immunization Practices (ACIP).    This lists the preventive care services that should be considered, and provides dates of when you are due. Items listed as completed are up-to-date and do not require any further intervention.    Health Maintenance   Topic Date Due   • COLONOSCOPY  2016   • DXA SCAN  2017   • PAP SMEAR  2018   • MEDICARE ANNUAL WELLNESS  2018   • MAMMOGRAM  2018   • PNEUMOCOCCAL VACCINES (65+ LOW/MEDIUM RISK) (2 of 2 - PPSV23) 10/07/2021   • TDAP/TD VACCINES (2 - Td) 2027   • HEPATITIS C SCREENING  Completed   • INFLUENZA VACCINE  Addressed   • ZOSTER VACCINE  Addressed       Orders Placed This Encounter   Procedures   • DEXA Bone Density Axial     Standing Status:   Future     Standing Expiration Date:   2018     Order Specific Question:   Reason for Exam:     Answer:   recheck   • Cologuard - Stool, Per Rectum     Standing Status:   Future     Standing Expiration Date:   2018       Return in about 3 months (around 2018) for Recheck.

## 2017-11-21 NOTE — PROGRESS NOTES
QUICK REFERENCE INFORMATION:  The ABCs of the Annual Wellness Visit    Welcome to Medicare Visit    HEALTH RISK ASSESSMENT    1950    Recent Hospitalizations:  No hospitalization(s) within the last year..      Current Medical Providers:  Patient Care Team:  Anay Thomson MD as PCP - General  Marty Purcell MD as Consulting Physician (Dermatology)  Henry Estrada DO as Consulting Physician (Gastroenterology)      Smoking Status:  History   Smoking Status   • Never Smoker   Smokeless Tobacco   • Never Used       Alcohol Consumption:  History   Alcohol Use No       Depression Screen:   PHQ-2/PHQ-9 Depression Screening 11/21/2017   Little interest or pleasure in doing things 0   Feeling down, depressed, or hopeless 0   Trouble falling or staying asleep, or sleeping too much 1   Feeling tired or having little energy 0   Poor appetite or overeating 0   Feeling bad about yourself - or that you are a failure or have let yourself or your family down 0   Trouble concentrating on things, such as reading the newspaper or watching television -   Moving or speaking so slowly that other people could have noticed. Or the opposite - being so fidgety or restless that you have been moving around a lot more than usual 0   Thoughts that you would be better off dead, or of hurting yourself in some way 0   Total Score 1   If you checked off any problems, how difficult have these problems made it for you to do your work, take care of things at home, or get along with other people? Not difficult at all       Health Habits and Functional and Cognitive Screening:  Functional & Cognitive Status 11/21/2017   Do you have difficulty preparing food and eating? No   Do you have difficulty bathing yourself, getting dressed or grooming yourself? No   Do you have difficulty using the toilet? No   Do you have difficulty moving around from place to place? No   Do you have trouble with steps or getting out of a bed or a chair? No   In the  past year have you fallen or experienced a near fall? No   Current Diet Well Balanced Diet   Dental Exam Up to date   Eye Exam Unknown   Exercise (times per week) 3 times per week   Do you need help using the phone?  No   Are you deaf or do you have serious difficulty hearing?  No   Do you need help with transportation? No   Do you need help shopping? No   Do you need help preparing meals?  No   Do you need help with housework?  No   Do you need help with laundry? No   Do you need help taking your medications? No   Do you need help managing money? No   Have you felt unusual stress, anger or loneliness in the last month? No   Who do you live with? Spouse   If you need help, do you have trouble finding someone available to you? No   Do you have difficulty concentrating, remembering or making decisions? No           Does the patient have evidence of cognitive impairment? No    Aspirin use counseling? Does not need ASA (and currently is not on it)      Recent Lab Results:  CMP:  Lab Results   Component Value Date    BUN 24 (H) 11/17/2017    CREATININE 0.78 11/17/2017    EGFRIFNONA 74 11/17/2017    BCR 30.8 (H) 11/17/2017     (L) 11/17/2017    K 4.7 11/17/2017    CO2 27.0 11/17/2017    CALCIUM 9.3 11/17/2017    ALBUMIN 4.60 11/17/2017    LABIL2 1.4 11/17/2017    BILITOT 0.4 11/17/2017    ALKPHOS 70 11/17/2017    AST 35 11/17/2017    ALT 30 11/17/2017     Lipid Panel:  Lab Results   Component Value Date    CHOL 231 (H) 11/17/2017    TRIG 106 11/17/2017    HDL 54 (L) 11/17/2017    LDLCALC 113 10/20/2015    LDLDIRECT 144 (H) 11/17/2017    LDLHDL 2.89 11/17/2017     HbA1c:       Visual Acuity:   Visual Acuity Screening    Right eye Left eye Both eyes   Without correction: 20/40 20/25 20/25   With correction: 20/40 20/40 20/25       Age-appropriate Screening Schedule:  Refer to the list below for future screening recommendations based on patient's age, sex and/or medical conditions. Orders for these recommended tests  are listed in the plan section. The patient has been provided with a written plan.    Health Maintenance   Topic Date Due   • COLONOSCOPY  09/01/2016   • DXA SCAN  11/21/2017   • PAP SMEAR  11/05/2018   • LIPID PANEL  11/17/2018   • MAMMOGRAM  11/22/2018   • PNEUMOCOCCAL VACCINES (65+ LOW/MEDIUM RISK) (2 of 2 - PPSV23) 10/07/2021   • TDAP/TD VACCINES (2 - Td) 11/05/2027   • INFLUENZA VACCINE  Addressed   • ZOSTER VACCINE  Addressed        Subjective   History of Present Illness    Angella Baez is a 67 y.o. female an established patient presenting for a Welcome to Medicare Visit.     The following portions of the patient's history were reviewed and updated as appropriate: allergies, current medications, past family history, past medical history, past social history, past surgical history and problem list.    Outpatient Medications Prior to Visit   Medication Sig Dispense Refill   • clindamycin (CLEOCIN) 300 MG capsule Take 1 capsule by mouth 3 (Three) Times a Day for 10 days. 30 capsule 0   • desoximetasone (TOPICORT) 0.25 % cream      • docusate sodium (COLACE) 100 MG capsule Take 100 mg by mouth every night. Takes 4 cap nightly     • Magnesium 250 MG tablet Take 4 tablets by mouth.     • diltiaZEM (CARDIZEM) 60 MG tablet Take 1 tablet by mouth 2 (Two) Times a Day. 60 tablet 2   • gabapentin (NEURONTIN) 300 MG capsule Take 1 capsule by mouth 3 (Three) Times a Day. 270 capsule 0   • LORazepam (ATIVAN) 1 MG tablet Take 1 tablet by mouth Every Night. 30 tablet 2   • spironolactone (ALDACTONE) 25 MG tablet Take 25 mg by mouth Daily.       No facility-administered medications prior to visit.        Patient Active Problem List   Diagnosis   • Depressive disorder   • Essential hypertension   • Primary fibromyalgia syndrome   • Insomnia   • Acute midline low back pain with right-sided sciatica       Advance Care Planning:  has NO advance directive - information provided to the patient today    Identification of Risk  "Factors:  Risk factors include: weight , unhealthy diet, inactivity and increased fall risk.    Review of Systems    Compared to one year ago, the patient feels her physical health is worse.  Compared to one year ago, the patient feels her mental health is the same.    Objective    Physical Exam   Constitutional: She is oriented to person, place, and time. She appears well-developed and well-nourished. No distress.   HENT:   Head: Normocephalic and atraumatic.   Nose: Nose normal.   Mouth/Throat: Oropharynx is clear and moist.   Eyes: Conjunctivae and EOM are normal. Pupils are equal, round, and reactive to light. Right eye exhibits no discharge. Left eye exhibits no discharge.   Neck: No thyromegaly present.   Cardiovascular: Normal rate, regular rhythm, normal heart sounds and intact distal pulses.    Pulmonary/Chest: Effort normal and breath sounds normal.   Lymphadenopathy:     She has no cervical adenopathy.   Neurological: She is alert and oriented to person, place, and time.   Skin: Skin is warm and dry.   Psychiatric: She has a normal mood and affect.   Nursing note and vitals reviewed.      Vitals:    11/21/17 1305 11/21/17 1331   BP: 130/85 142/84   BP Location: Left arm    Patient Position: Sitting    Cuff Size: Adult    Pulse: 91    SpO2: 98%    Weight: 148 lb 14.4 oz (67.5 kg)    Height: 66\" (167.6 cm)    PainSc: 6  Comment: hips back and legs    PainLoc: Hip        Body mass index is 24.03 kg/(m^2).  Discussed the patient's BMI with her. The BMI is in the acceptable range.    Procedure   Procedures       Assessment/Plan   Patient Self-Management and Personalized Health Advice  The patient has been provided with information about: diet, exercise, weight management and fall prevention and preventive services including:   · Advance directive, Colorectal cancer screening, cologuard test ordered, Exercise counseling provided, Fall Risk assessment done, Fall Risk plan of care done.    Visit Diagnoses:    " ICD-10-CM ICD-9-CM   1. Medicare annual wellness visit, initial Z00.00 V70.0   2. Screen for colon cancer Z12.11 V76.51   3. Postmenopausal bone loss M81.0 733.01   4. Encounter for screening for osteoporosis Z13.820 V82.81   5. Essential hypertension I10 401.9   6. Mixed hyperlipidemia E78.2 272.2   7. Primary fibromyalgia syndrome M79.7 729.1   8. Insomnia, unspecified type G47.00 780.52       Orders Placed This Encounter   Procedures   • DEXA Bone Density Axial     Standing Status:   Future     Standing Expiration Date:   2018     Order Specific Question:   Reason for Exam:     Answer:   recheck   • Cologuard - Stool, Per Rectum     Standing Status:   Future     Standing Expiration Date:   2018       Outpatient Encounter Prescriptions as of 2017   Medication Sig Dispense Refill   • [] clindamycin (CLEOCIN) 300 MG capsule Take 1 capsule by mouth 3 (Three) Times a Day for 10 days. 30 capsule 0   • desoximetasone (TOPICORT) 0.25 % cream      • diltiaZEM (CARDIZEM) 60 MG tablet Take 1 tablet by mouth 2 (Two) Times a Day. 180 tablet 3   • docusate sodium (COLACE) 100 MG capsule Take 100 mg by mouth every night. Takes 4 cap nightly     • gabapentin (NEURONTIN) 300 MG capsule 1 bid and 2 qhs 360 capsule 0   • LORazepam (ATIVAN) 1 MG tablet Take 1 tablet by mouth Every Night. 90 tablet 0   • Magnesium 250 MG tablet Take 4 tablets by mouth.     • spironolactone (ALDACTONE) 25 MG tablet Take 1 tablet by mouth Daily. 90 tablet 3   • [DISCONTINUED] diltiaZEM (CARDIZEM) 60 MG tablet Take 1 tablet by mouth 2 (Two) Times a Day. 60 tablet 2   • [DISCONTINUED] gabapentin (NEURONTIN) 300 MG capsule Take 1 capsule by mouth 3 (Three) Times a Day. 270 capsule 0   • [DISCONTINUED] LORazepam (ATIVAN) 1 MG tablet Take 1 tablet by mouth Every Night. 30 tablet 2   • [DISCONTINUED] spironolactone (ALDACTONE) 25 MG tablet Take 25 mg by mouth Daily.       No facility-administered encounter medications on file as of  11/21/2017.        Reviewed use of high risk medication in the elderly: yes  Reviewed for potential of harmful drug interactions in the elderly: not applicable    Follow Up:  Return in about 3 months (around 2/21/2018) for Recheck.     An After Visit Summary and PPPS with all of these plans were given to the patient.    Information has been scanned into chart.  Discussed importance of taking medications as prescribed. Encouraged healthy eating habits with low fat, low salt choices and working towards maintaining a healthy weight. Recommended regular exercise if able as well as care to prevent falls.

## 2017-11-21 NOTE — PROGRESS NOTES
Subjective   Angella Baez is a 67 y.o. female.     Chief Complaint   Patient presents with   • Hypertension   • Fibromyalgia   • Insomnia   • Hip Pain     both   • Back Pain   • Leg Pain     both     For review and evaluation of management of chronic medical problems. Labs reviewed. Due for mammogram and bone density.     Hypertension   This is a chronic problem. The current episode started more than 1 year ago. The problem is unchanged. The problem is controlled. Pertinent negatives include no neck pain, palpitations or shortness of breath. There are no associated agents to hypertension. Past treatments include calcium channel blockers and diuretics. The current treatment provides moderate improvement. There are no compliance problems.    Fibromyalgia   This is a chronic problem. The current episode started more than 1 year ago. The problem occurs constantly. The problem has been unchanged. Associated symptoms include myalgias. Pertinent negatives include no arthralgias, chills, congestion, coughing, fatigue, joint swelling, nausea, neck pain, rash, sore throat or vomiting. The symptoms are aggravated by stress. Treatments tried: gabapentin. The treatment provided moderate relief.   Insomnia   This is a chronic problem. The current episode started more than 1 year ago. The problem occurs constantly. The problem has been unchanged. Associated symptoms include myalgias. Pertinent negatives include no arthralgias, chills, congestion, coughing, fatigue, joint swelling, nausea, neck pain, rash, sore throat or vomiting. The symptoms are aggravated by stress. Treatments tried: lorazepam. The treatment provided significant relief.        The following portions of the patient's history were reviewed and updated as appropriate: allergies, current medications, past family and social history and problem list.    Outpatient Medications Prior to Visit   Medication Sig Dispense Refill   • clindamycin (CLEOCIN) 300 MG capsule  "Take 1 capsule by mouth 3 (Three) Times a Day for 10 days. 30 capsule 0   • desoximetasone (TOPICORT) 0.25 % cream      • docusate sodium (COLACE) 100 MG capsule Take 100 mg by mouth every night. Takes 4 cap nightly     • Magnesium 250 MG tablet Take 4 tablets by mouth.     • diltiaZEM (CARDIZEM) 60 MG tablet Take 1 tablet by mouth 2 (Two) Times a Day. 60 tablet 2   • gabapentin (NEURONTIN) 300 MG capsule Take 1 capsule by mouth 3 (Three) Times a Day. 270 capsule 0   • LORazepam (ATIVAN) 1 MG tablet Take 1 tablet by mouth Every Night. 30 tablet 2   • spironolactone (ALDACTONE) 25 MG tablet Take 25 mg by mouth Daily.       No facility-administered medications prior to visit.        Review of Systems   Constitutional: Negative.  Negative for chills, fatigue and unexpected weight change.   HENT: Negative.  Negative for congestion, ear pain, hearing loss, nosebleeds, rhinorrhea, sneezing, sore throat and tinnitus.    Eyes: Negative.  Negative for discharge.   Respiratory: Negative.  Negative for cough, shortness of breath and wheezing.    Cardiovascular: Negative.  Negative for palpitations.   Gastrointestinal: Negative.  Negative for constipation, diarrhea, nausea and vomiting.   Endocrine: Negative.    Genitourinary: Negative.  Negative for frequency and urgency.   Musculoskeletal: Positive for myalgias. Negative for arthralgias, joint swelling and neck pain.   Skin: Negative.  Negative for rash.   Allergic/Immunologic: Negative.    Neurological: Negative.  Negative for dizziness.   Hematological: Negative.  Negative for adenopathy.   Psychiatric/Behavioral: Negative for dysphoric mood and sleep disturbance. The patient has insomnia. The patient is not nervous/anxious.      Objective     Visit Vitals   • /84   • Pulse 91   • Ht 66\" (167.6 cm)   • Wt 148 lb 14.4 oz (67.5 kg)   • SpO2 98%   • BMI 24.03 kg/m2     Physical Exam   Constitutional: She is oriented to person, place, and time. She appears well-developed " and well-nourished. No distress.   HENT:   Head: Normocephalic and atraumatic.   Nose: Nose normal.   Mouth/Throat: Oropharynx is clear and moist.   Eyes: Conjunctivae and EOM are normal. Pupils are equal, round, and reactive to light. Right eye exhibits no discharge. Left eye exhibits no discharge.   Neck: No tracheal deviation present. No thyromegaly present.   Cardiovascular: Normal rate, regular rhythm, normal heart sounds and intact distal pulses.    No murmur heard.  Pulmonary/Chest: Effort normal and breath sounds normal. No respiratory distress. She has no wheezes. She has no rales. She exhibits no tenderness. Right breast exhibits no inverted nipple, no mass, no nipple discharge, no skin change and no tenderness. Left breast exhibits no inverted nipple, no mass, no nipple discharge, no skin change and no tenderness.   Abdominal: Soft. Bowel sounds are normal. She exhibits no distension and no mass. There is no tenderness. No hernia.   Musculoskeletal: Normal range of motion. She exhibits no deformity.   Lymphadenopathy:     She has no cervical adenopathy.   Neurological: She is alert and oriented to person, place, and time. She has normal reflexes.   Skin: Skin is warm and dry.   Psychiatric: She has a normal mood and affect. Her behavior is normal. Judgment and thought content normal.   Nursing note and vitals reviewed.    Results for orders placed or performed in visit on 11/17/17   Comprehensive Metabolic Panel   Result Value Ref Range    Glucose 94 60 - 100 mg/dL    BUN 24 (H) 7 - 21 mg/dL    Creatinine 0.78 0.50 - 1.00 mg/dL    Sodium 132 (L) 137 - 145 mmol/L    Potassium 4.7 3.5 - 5.1 mmol/L    Chloride 94 (L) 95 - 110 mmol/L    CO2 27.0 22.0 - 31.0 mmol/L    Calcium 9.3 8.4 - 10.2 mg/dL    Total Protein 7.9 6.3 - 8.6 g/dL    Albumin 4.60 3.40 - 4.80 g/dL    ALT (SGPT) 30 9 - 52 U/L    AST (SGOT) 35 14 - 36 U/L    Alkaline Phosphatase 70 38 - 126 U/L    Total Bilirubin 0.4 0.2 - 1.3 mg/dL    eGFR Non   Amer 74 45 - 104 mL/min/1.73    Globulin 3.3 2.3 - 3.5 gm/dL    A/G Ratio 1.4 1.1 - 1.8 g/dL    BUN/Creatinine Ratio 30.8 (H) 7.0 - 25.0    Anion Gap 11.0 5.0 - 15.0 mmol/L   Lipid Panel   Result Value Ref Range    Total Cholesterol 231 (H) 0 - 199 mg/dL    Triglycerides 106 20 - 199 mg/dL    HDL Cholesterol 54 (L) 60 - 200 mg/dL    LDL Cholesterol  144 (H) 1 - 129 mg/dL    LDL/HDL Ratio 2.89 0.00 - 3.22   Vitamin D 25 Hydroxy   Result Value Ref Range    25 Hydroxy, Vitamin D 44.3 30.0 - 100.0 ng/ml   Urinalysis - Urine, Clean Catch   Result Value Ref Range    Color, UA Yellow Yellow, Straw, Dark Yellow, Judi    Appearance, UA Clear Clear    pH, UA 6.0 5.0 - 9.0    Specific Gravity, UA 1.020 1.003 - 1.030    Glucose, UA Negative Negative    Ketones, UA Negative Negative    Bilirubin, UA Negative Negative    Blood, UA Negative Negative    Protein, UA Negative Negative    Leuk Esterase, UA Negative Negative    Nitrite, UA Negative Negative    Urobilinogen, UA 0.2 E.U./dL 0.2 - 1.0 E.U./dL   Urinalysis, Microscopic Only - Urine, Clean Catch   Result Value Ref Range    RBC, UA None Seen None Seen /HPF    WBC, UA None Seen None Seen, 0-2, 3-5 /HPF    Bacteria, UA None Seen None Seen /HPF    Squamous Epithelial Cells, UA None Seen None Seen, 0-2 /HPF    Hyaline Casts, UA None Seen None Seen /LPF    Methodology Automated Microscopy       Assessment/Plan   Problem List Items Addressed This Visit        Cardiovascular and Mediastinum    Essential hypertension (Chronic)    Relevant Medications    spironolactone (ALDACTONE) 25 MG tablet    diltiaZEM (CARDIZEM) 60 MG tablet       Musculoskeletal and Integument    Primary fibromyalgia syndrome (Chronic)       Other    Insomnia (Chronic)      Other Visit Diagnoses     Medicare annual wellness visit, initial    -  Primary    Screen for colon cancer        Relevant Orders    Cologuard - Stool, Per Rectum    Postmenopausal bone loss        Relevant Orders    DEXA Bone  Density Axial    Encounter for screening for osteoporosis        Relevant Orders    DEXA Bone Density Axial    Mixed hyperlipidemia              Will notify regarding results. Continue current treatment. [place] reviewed and appropriate. Not recommended to drive or operate heavy equipment while taking potentially sedating meds.  Patient understands the risks associated with this controlled medication, including tolerance and addiction. They also agree to obtain this medication only from me, and not from a another provider, unless that provider is covering for me in my absence. They also agree to be compliant in dosing, and not self adjust the dose of medication.  A signed controlled substance agreement is on file, and they have received a controlled substance education sheet at this or a previous visit. They have also signed a consent for treatment with a controlled substance as per [facility] policy.      New Medications Ordered This Visit   Medications   • gabapentin (NEURONTIN) 300 MG capsule     Si bid and 2 qhs     Dispense:  360 capsule     Refill:  0   • LORazepam (ATIVAN) 1 MG tablet     Sig: Take 1 tablet by mouth Every Night.     Dispense:  90 tablet     Refill:  0   • spironolactone (ALDACTONE) 25 MG tablet     Sig: Take 1 tablet by mouth Daily.     Dispense:  90 tablet     Refill:  3   • diltiaZEM (CARDIZEM) 60 MG tablet     Sig: Take 1 tablet by mouth 2 (Two) Times a Day.     Dispense:  180 tablet     Refill:  3     Return in about 3 months (around 2018) for Recheck.

## 2017-12-15 ENCOUNTER — OFFICE VISIT (OUTPATIENT)
Dept: FAMILY MEDICINE CLINIC | Facility: CLINIC | Age: 67
End: 2017-12-15

## 2017-12-15 ENCOUNTER — TELEPHONE (OUTPATIENT)
Dept: FAMILY MEDICINE CLINIC | Facility: CLINIC | Age: 67
End: 2017-12-15

## 2017-12-15 VITALS
BODY MASS INDEX: 23.78 KG/M2 | DIASTOLIC BLOOD PRESSURE: 80 MMHG | OXYGEN SATURATION: 100 % | SYSTOLIC BLOOD PRESSURE: 140 MMHG | WEIGHT: 148 LBS | HEIGHT: 66 IN | HEART RATE: 79 BPM

## 2017-12-15 DIAGNOSIS — G47.00 INSOMNIA, UNSPECIFIED TYPE: Chronic | ICD-10-CM

## 2017-12-15 DIAGNOSIS — M79.7 PRIMARY FIBROMYALGIA SYNDROME: Primary | Chronic | ICD-10-CM

## 2017-12-15 PROCEDURE — 99213 OFFICE O/P EST LOW 20 MIN: CPT | Performed by: GENERAL PRACTICE

## 2017-12-15 RX ORDER — TRAMADOL HYDROCHLORIDE 50 MG/1
50 TABLET ORAL EVERY 6 HOURS PRN
Qty: 60 TABLET | Refills: 1 | Status: SHIPPED | OUTPATIENT
Start: 2017-12-15 | End: 2018-02-22 | Stop reason: SDUPTHER

## 2017-12-15 NOTE — PROGRESS NOTES
Subjective   Angella Baez is a 67 y.o. female.   Chief Complaint   Patient presents with   • Med Refill   • Leg Pain   • Hypertension     Has fibromyalgia and doesn't sleep well at night. Has found that tramadol 50 mg 1/2 a tab qhs will help with her pain at night. Gabapentin has not been helping much.    Hypertension   This is a chronic problem. The current episode started more than 1 year ago. The problem is unchanged. The problem is controlled. Pertinent negatives include no neck pain, palpitations or shortness of breath. There are no associated agents to hypertension. Past treatments include calcium channel blockers and diuretics. The current treatment provides moderate improvement. There are no compliance problems.    Fibromyalgia   This is a chronic problem. The current episode started more than 1 year ago. The problem occurs constantly. The problem has been unchanged. Associated symptoms include myalgias. Pertinent negatives include no arthralgias, chills, congestion, coughing, fatigue, joint swelling, nausea, neck pain, rash, sore throat or vomiting. The symptoms are aggravated by stress. Treatments tried: gabapentin. The treatment provided moderate relief.   Insomnia   This is a chronic problem. The current episode started more than 1 year ago. The problem occurs constantly. The problem has been unchanged. Associated symptoms include myalgias. Pertinent negatives include no arthralgias, chills, congestion, coughing, fatigue, joint swelling, nausea, neck pain, rash, sore throat or vomiting. The symptoms are aggravated by stress. Treatments tried: lorazepam. The treatment provided significant relief.      The following portions of the patient's history were reviewed and updated as appropriate: allergies, current medications, past social history and problem list.    Outpatient Medications Prior to Visit   Medication Sig Dispense Refill   • desoximetasone (TOPICORT) 0.25 % cream      • diltiaZEM (CARDIZEM)  "60 MG tablet Take 1 tablet by mouth 2 (Two) Times a Day. 180 tablet 3   • docusate sodium (COLACE) 100 MG capsule Take 100 mg by mouth every night. Takes 4 cap nightly     • gabapentin (NEURONTIN) 300 MG capsule 1 bid and 2 qhs 360 capsule 0   • LORazepam (ATIVAN) 1 MG tablet Take 1 tablet by mouth Every Night. 90 tablet 0   • Magnesium 250 MG tablet Take 4 tablets by mouth.     • spironolactone (ALDACTONE) 25 MG tablet Take 1 tablet by mouth Daily. 90 tablet 3     No facility-administered medications prior to visit.        Review of Systems   Constitutional: Negative for chills and fatigue.   HENT: Negative for congestion and sore throat.    Respiratory: Negative for cough and shortness of breath.    Cardiovascular: Negative for palpitations.   Gastrointestinal: Negative for nausea and vomiting.   Musculoskeletal: Positive for myalgias. Negative for arthralgias, joint swelling and neck pain.   Skin: Negative for rash.   Psychiatric/Behavioral: The patient has insomnia.      Objective   Visit Vitals   • /80   • Pulse 79   • Ht 167.6 cm (66\")   • Wt 67.1 kg (148 lb)   • SpO2 100%   • BMI 23.89 kg/m2     Physical Exam   Constitutional: She is oriented to person, place, and time. She appears well-developed and well-nourished. No distress.   HENT:   Head: Normocephalic and atraumatic.   Nose: Nose normal.   Mouth/Throat: Oropharynx is clear and moist.   Eyes: Conjunctivae and EOM are normal. Pupils are equal, round, and reactive to light. Right eye exhibits no discharge. Left eye exhibits no discharge.   Neck: No thyromegaly present.   Cardiovascular: Normal rate, regular rhythm, normal heart sounds and intact distal pulses.    Pulmonary/Chest: Effort normal and breath sounds normal.   Lymphadenopathy:     She has no cervical adenopathy.   Neurological: She is alert and oriented to person, place, and time.   Skin: Skin is warm and dry.   Psychiatric: She has a normal mood and affect.   Nursing note and vitals " reviewed.      Assessment/Plan   Problem List Items Addressed This Visit        Musculoskeletal and Integument    Primary fibromyalgia syndrome - Primary (Chronic)       Other    Insomnia (Chronic)          [place] reviewed and appropriate. Not recommended to drive or operate heavy equipment while taking potentially sedating meds.  Patient understands the risks associated with this controlled medication, including tolerance and addiction. They also agree to obtain this medication only from me, and not from a another provider, unless that provider is covering for me in my absence. They also agree to be compliant in dosing, and not self adjust the dose of medication.  A signed controlled substance agreement is on file, and they have received a controlled substance education sheet at this or a previous visit. They have also signed a consent for treatment with a controlled substance as per [facility] policy.      New Medications Ordered This Visit   Medications   • traMADol (ULTRAM) 50 MG tablet     Sig: Take 1 tablet by mouth Every 6 (Six) Hours As Needed for Moderate Pain .     Dispense:  60 tablet     Refill:  1     Return for Next scheduled follow up.

## 2018-02-22 ENCOUNTER — OFFICE VISIT (OUTPATIENT)
Dept: FAMILY MEDICINE CLINIC | Facility: CLINIC | Age: 68
End: 2018-02-22

## 2018-02-22 VITALS
WEIGHT: 153.9 LBS | DIASTOLIC BLOOD PRESSURE: 80 MMHG | HEART RATE: 90 BPM | OXYGEN SATURATION: 98 % | HEIGHT: 66 IN | BODY MASS INDEX: 24.73 KG/M2 | SYSTOLIC BLOOD PRESSURE: 120 MMHG

## 2018-02-22 DIAGNOSIS — M79.7 PRIMARY FIBROMYALGIA SYNDROME: Primary | Chronic | ICD-10-CM

## 2018-02-22 DIAGNOSIS — I10 ESSENTIAL HYPERTENSION: Chronic | ICD-10-CM

## 2018-02-22 DIAGNOSIS — G47.00 INSOMNIA, UNSPECIFIED TYPE: Chronic | ICD-10-CM

## 2018-02-22 PROCEDURE — 99214 OFFICE O/P EST MOD 30 MIN: CPT | Performed by: GENERAL PRACTICE

## 2018-02-22 RX ORDER — TRAMADOL HYDROCHLORIDE 50 MG/1
50 TABLET ORAL 2 TIMES DAILY PRN
Qty: 60 TABLET | Refills: 2 | Status: SHIPPED | OUTPATIENT
Start: 2018-02-22 | End: 2018-05-24 | Stop reason: SDUPTHER

## 2018-02-22 RX ORDER — LORAZEPAM 1 MG/1
1 TABLET ORAL NIGHTLY
Qty: 90 TABLET | Refills: 0 | Status: SHIPPED | OUTPATIENT
Start: 2018-02-22 | End: 2018-05-24 | Stop reason: SDUPTHER

## 2018-02-22 NOTE — PROGRESS NOTES
Subjective   Angella Baez is a 67 y.o. female.   Chief Complaint   Patient presents with   • Hypertension     Has fibromyalgia and doesn't sleep well at night. Has found that tramadol 50 mg 1/2 a tab qhs will help with her pain at night. Sleeps fairly well with lorazepam. Gabapentin has not been helping much, has cut back to one at bedtime.   Hypertension   This is a chronic problem. The current episode started more than 1 year ago. The problem is unchanged. The problem is controlled. Pertinent negatives include no chest pain, headaches, neck pain, palpitations or shortness of breath. There are no associated agents to hypertension. Past treatments include calcium channel blockers and diuretics. The current treatment provides moderate improvement. There are no compliance problems.    Fibromyalgia   This is a chronic problem. The current episode started more than 1 year ago. The problem occurs constantly. The problem has been unchanged. Associated symptoms include myalgias. Pertinent negatives include no abdominal pain, arthralgias, chest pain, chills, congestion, coughing, fatigue, fever, headaches, joint swelling, nausea, neck pain, numbness, rash, sore throat, vomiting or weakness. The symptoms are aggravated by stress. Treatments tried: gabapentin. The treatment provided moderate relief.   Insomnia   This is a chronic problem. The current episode started more than 1 year ago. The problem occurs constantly. The problem has been unchanged. Associated symptoms include myalgias. Pertinent negatives include no abdominal pain, arthralgias, chest pain, chills, congestion, coughing, fatigue, fever, headaches, joint swelling, nausea, neck pain, numbness, rash, sore throat, vomiting or weakness. The symptoms are aggravated by stress. Treatments tried: lorazepam. The treatment provided significant relief.      The following portions of the patient's history were reviewed and updated as appropriate: allergies, current  medications, past social history and problem list.    Outpatient Medications Prior to Visit   Medication Sig Dispense Refill   • desoximetasone (TOPICORT) 0.25 % cream      • diltiaZEM (CARDIZEM) 60 MG tablet Take 1 tablet by mouth 2 (Two) Times a Day. 180 tablet 3   • docusate sodium (COLACE) 100 MG capsule Take 100 mg by mouth every night. Takes 4 cap nightly     • gabapentin (NEURONTIN) 300 MG capsule 1 bid and 2 qhs 360 capsule 0   • Magnesium 250 MG tablet Take 4 tablets by mouth.     • spironolactone (ALDACTONE) 25 MG tablet Take 1 tablet by mouth Daily. 90 tablet 3   • LORazepam (ATIVAN) 1 MG tablet Take 1 tablet by mouth Every Night. 90 tablet 0   • traMADol (ULTRAM) 50 MG tablet Take 1 tablet by mouth Every 6 (Six) Hours As Needed for Moderate Pain . 60 tablet 1     No facility-administered medications prior to visit.        Review of Systems   Constitutional: Negative.  Negative for chills, fatigue, fever and unexpected weight change.   HENT: Negative.  Negative for congestion, ear pain, hearing loss, nosebleeds, rhinorrhea, sneezing, sore throat and tinnitus.    Eyes: Negative.  Negative for discharge.   Respiratory: Negative.  Negative for cough, shortness of breath and wheezing.    Cardiovascular: Negative.  Negative for chest pain and palpitations.   Gastrointestinal: Negative.  Negative for abdominal pain, constipation, diarrhea, nausea and vomiting.   Endocrine: Negative.    Genitourinary: Negative.  Negative for dysuria, frequency and urgency.   Musculoskeletal: Positive for myalgias. Negative for arthralgias, back pain, joint swelling and neck pain.   Skin: Negative.  Negative for rash.   Allergic/Immunologic: Negative.    Neurological: Negative.  Negative for dizziness, weakness, numbness and headaches.   Hematological: Negative.  Negative for adenopathy.   Psychiatric/Behavioral: Negative for dysphoric mood and sleep disturbance. The patient has insomnia. The patient is not nervous/anxious.   "    Objective   Visit Vitals   • /80   • Pulse 90   • Ht 167.6 cm (66\")   • Wt 69.8 kg (153 lb 14.4 oz)   • SpO2 98%   • BMI 24.84 kg/m2     Physical Exam   Constitutional: She is oriented to person, place, and time. She appears well-developed and well-nourished. No distress.   HENT:   Head: Normocephalic and atraumatic.   Nose: Nose normal.   Mouth/Throat: Oropharynx is clear and moist.   Eyes: Conjunctivae and EOM are normal. Pupils are equal, round, and reactive to light. Right eye exhibits no discharge. Left eye exhibits no discharge.   Neck: No thyromegaly present.   Cardiovascular: Normal rate, regular rhythm, normal heart sounds and intact distal pulses.    Pulmonary/Chest: Effort normal and breath sounds normal.   Lymphadenopathy:     She has no cervical adenopathy.   Neurological: She is alert and oriented to person, place, and time.   Skin: Skin is warm and dry.   Psychiatric: She has a normal mood and affect.   Nursing note and vitals reviewed.    Assessment/Plan   Problem List Items Addressed This Visit        Cardiovascular and Mediastinum    Essential hypertension (Chronic)       Musculoskeletal and Integument    Primary fibromyalgia syndrome - Primary (Chronic)       Other    Insomnia (Chronic)          Continue current treatment.  [place] reviewed and appropriate. Not recommended to drive or operate heavy equipment while taking potentially sedating meds.  Patient understands the risks associated with this controlled medication, including tolerance and addiction. They also agree to obtain this medication only from me, and not from a another provider, unless that provider is covering for me in my absence. They also agree to be compliant in dosing, and not self adjust the dose of medication.  A signed controlled substance agreement is on file, and they have received a controlled substance education sheet at this or a previous visit. They have also signed a consent for treatment with a controlled " substance as per [facility] policy.      New Medications Ordered This Visit   Medications   • traMADol (ULTRAM) 50 MG tablet     Sig: Take 1 tablet by mouth 2 (Two) Times a Day As Needed for Moderate Pain .     Dispense:  60 tablet     Refill:  2   • LORazepam (ATIVAN) 1 MG tablet     Sig: Take 1 tablet by mouth Every Night.     Dispense:  90 tablet     Refill:  0     Return in about 3 months (around 5/22/2018) for Recheck.

## 2018-03-30 DIAGNOSIS — Z12.11 SCREEN FOR COLON CANCER: ICD-10-CM

## 2018-05-24 ENCOUNTER — OFFICE VISIT (OUTPATIENT)
Dept: FAMILY MEDICINE CLINIC | Facility: CLINIC | Age: 68
End: 2018-05-24

## 2018-05-24 VITALS
DIASTOLIC BLOOD PRESSURE: 80 MMHG | BODY MASS INDEX: 24.43 KG/M2 | HEART RATE: 80 BPM | SYSTOLIC BLOOD PRESSURE: 130 MMHG | HEIGHT: 66 IN | WEIGHT: 152 LBS | OXYGEN SATURATION: 98 %

## 2018-05-24 DIAGNOSIS — M79.7 PRIMARY FIBROMYALGIA SYNDROME: Chronic | ICD-10-CM

## 2018-05-24 DIAGNOSIS — I10 ESSENTIAL HYPERTENSION: Primary | Chronic | ICD-10-CM

## 2018-05-24 DIAGNOSIS — G47.00 INSOMNIA, UNSPECIFIED TYPE: Chronic | ICD-10-CM

## 2018-05-24 PROCEDURE — 99213 OFFICE O/P EST LOW 20 MIN: CPT | Performed by: GENERAL PRACTICE

## 2018-05-24 RX ORDER — TRAMADOL HYDROCHLORIDE 50 MG/1
50 TABLET ORAL 2 TIMES DAILY PRN
Qty: 180 TABLET | Refills: 0 | Status: SHIPPED | OUTPATIENT
Start: 2018-05-24 | End: 2018-08-30 | Stop reason: SDUPTHER

## 2018-05-24 RX ORDER — LORAZEPAM 1 MG/1
1 TABLET ORAL NIGHTLY
Qty: 120 TABLET | Refills: 0 | Status: SHIPPED | OUTPATIENT
Start: 2018-05-24 | End: 2018-08-30 | Stop reason: SDUPTHER

## 2018-05-24 RX ORDER — SPIRONOLACTONE 25 MG/1
37.5 TABLET ORAL DAILY
Qty: 135 TABLET | Refills: 3 | Status: SHIPPED | OUTPATIENT
Start: 2018-05-24 | End: 2018-07-02 | Stop reason: SDUPTHER

## 2018-05-25 ENCOUNTER — LAB (OUTPATIENT)
Dept: LAB | Facility: HOSPITAL | Age: 68
End: 2018-05-25

## 2018-05-25 DIAGNOSIS — I10 ESSENTIAL HYPERTENSION: Chronic | ICD-10-CM

## 2018-05-25 LAB
ANION GAP SERPL CALCULATED.3IONS-SCNC: 8 MMOL/L (ref 5–15)
BUN BLD-MCNC: 24 MG/DL (ref 7–21)
BUN/CREAT SERPL: 30 (ref 7–25)
CALCIUM SPEC-SCNC: 9.4 MG/DL (ref 8.4–10.2)
CHLORIDE SERPL-SCNC: 97 MMOL/L (ref 95–110)
CO2 SERPL-SCNC: 29 MMOL/L (ref 22–31)
CREAT BLD-MCNC: 0.8 MG/DL (ref 0.5–1)
GFR SERPL CREATININE-BSD FRML MDRD: 72 ML/MIN/1.73 (ref 45–104)
GLUCOSE BLD-MCNC: 89 MG/DL (ref 60–100)
POTASSIUM BLD-SCNC: 4.4 MMOL/L (ref 3.5–5.1)
SODIUM BLD-SCNC: 134 MMOL/L (ref 137–145)

## 2018-05-25 PROCEDURE — 80048 BASIC METABOLIC PNL TOTAL CA: CPT

## 2018-05-25 PROCEDURE — 36415 COLL VENOUS BLD VENIPUNCTURE: CPT

## 2018-06-11 DIAGNOSIS — M25.472 LEFT ANKLE SWELLING: Primary | ICD-10-CM

## 2018-07-02 DIAGNOSIS — I10 ESSENTIAL HYPERTENSION: Chronic | ICD-10-CM

## 2018-07-02 RX ORDER — SPIRONOLACTONE 25 MG/1
50 TABLET ORAL DAILY
Qty: 180 TABLET | Refills: 3 | Status: SHIPPED | OUTPATIENT
Start: 2018-07-02 | End: 2019-06-07

## 2018-08-30 ENCOUNTER — OFFICE VISIT (OUTPATIENT)
Dept: FAMILY MEDICINE CLINIC | Facility: CLINIC | Age: 68
End: 2018-08-30

## 2018-08-30 VITALS
BODY MASS INDEX: 23.18 KG/M2 | SYSTOLIC BLOOD PRESSURE: 134 MMHG | HEART RATE: 75 BPM | WEIGHT: 144.2 LBS | DIASTOLIC BLOOD PRESSURE: 80 MMHG | HEIGHT: 66 IN | OXYGEN SATURATION: 100 %

## 2018-08-30 DIAGNOSIS — I10 ESSENTIAL HYPERTENSION: Primary | Chronic | ICD-10-CM

## 2018-08-30 DIAGNOSIS — M79.7 PRIMARY FIBROMYALGIA SYNDROME: Chronic | ICD-10-CM

## 2018-08-30 DIAGNOSIS — G47.00 INSOMNIA, UNSPECIFIED TYPE: Chronic | ICD-10-CM

## 2018-08-30 PROCEDURE — 99213 OFFICE O/P EST LOW 20 MIN: CPT | Performed by: GENERAL PRACTICE

## 2018-08-30 RX ORDER — LORAZEPAM 1 MG/1
1 TABLET ORAL NIGHTLY
Qty: 120 TABLET | Refills: 0 | Status: SHIPPED | OUTPATIENT
Start: 2018-08-30 | End: 2018-12-03 | Stop reason: SDUPTHER

## 2018-08-30 RX ORDER — GABAPENTIN 300 MG/1
CAPSULE ORAL
Qty: 270 CAPSULE | Refills: 0 | Status: SHIPPED | OUTPATIENT
Start: 2018-08-30 | End: 2019-01-03 | Stop reason: SDUPTHER

## 2018-08-30 RX ORDER — TRAMADOL HYDROCHLORIDE 50 MG/1
50 TABLET ORAL 2 TIMES DAILY PRN
Qty: 180 TABLET | Refills: 0 | Status: SHIPPED | OUTPATIENT
Start: 2018-08-30 | End: 2019-03-05 | Stop reason: SDUPTHER

## 2018-09-06 DIAGNOSIS — Z12.31 ENCOUNTER FOR SCREENING MAMMOGRAM FOR MALIGNANT NEOPLASM OF BREAST: ICD-10-CM

## 2018-09-06 DIAGNOSIS — Z78.0 POST-MENOPAUSAL: Primary | ICD-10-CM

## 2018-09-24 ENCOUNTER — TELEPHONE (OUTPATIENT)
Dept: FAMILY MEDICINE CLINIC | Facility: CLINIC | Age: 68
End: 2018-09-24

## 2018-09-26 DIAGNOSIS — M54.42 CHRONIC BILATERAL LOW BACK PAIN WITH BILATERAL SCIATICA: Primary | ICD-10-CM

## 2018-09-26 DIAGNOSIS — M54.41 CHRONIC BILATERAL LOW BACK PAIN WITH BILATERAL SCIATICA: Primary | ICD-10-CM

## 2018-09-26 DIAGNOSIS — G62.9 NEUROPATHY: Primary | ICD-10-CM

## 2018-09-26 DIAGNOSIS — G89.29 CHRONIC BILATERAL LOW BACK PAIN WITH BILATERAL SCIATICA: Primary | ICD-10-CM

## 2018-09-26 DIAGNOSIS — M54.16 BILATERAL LUMBAR RADICULOPATHY: ICD-10-CM

## 2018-10-02 ENCOUNTER — HOSPITAL ENCOUNTER (OUTPATIENT)
Dept: MRI IMAGING | Facility: HOSPITAL | Age: 68
Discharge: HOME | End: 2018-10-02
Admitting: GENERAL PRACTICE

## 2018-10-02 ENCOUNTER — APPOINTMENT (OUTPATIENT)
Dept: MRI IMAGING | Facility: HOSPITAL | Age: 68
End: 2018-10-02

## 2018-10-02 DIAGNOSIS — G89.29 CHRONIC BILATERAL LOW BACK PAIN WITH BILATERAL SCIATICA: ICD-10-CM

## 2018-10-02 DIAGNOSIS — M54.16 BILATERAL LUMBAR RADICULOPATHY: ICD-10-CM

## 2018-10-02 DIAGNOSIS — M54.42 CHRONIC BILATERAL LOW BACK PAIN WITH BILATERAL SCIATICA: ICD-10-CM

## 2018-10-02 DIAGNOSIS — M54.41 CHRONIC BILATERAL LOW BACK PAIN WITH BILATERAL SCIATICA: ICD-10-CM

## 2018-10-02 PROCEDURE — 72148 MRI LUMBAR SPINE W/O DYE: CPT

## 2018-10-03 DIAGNOSIS — M51.36 DEGENERATIVE DISC DISEASE, LUMBAR: Primary | ICD-10-CM

## 2018-10-03 DIAGNOSIS — M79.7 PRIMARY FIBROMYALGIA SYNDROME: Chronic | ICD-10-CM

## 2018-10-04 ENCOUNTER — APPOINTMENT (OUTPATIENT)
Dept: MRI IMAGING | Facility: HOSPITAL | Age: 68
End: 2018-10-04

## 2018-11-27 ENCOUNTER — LAB (OUTPATIENT)
Dept: LAB | Facility: HOSPITAL | Age: 68
End: 2018-11-27

## 2018-11-27 DIAGNOSIS — G47.00 INSOMNIA, UNSPECIFIED TYPE: Chronic | ICD-10-CM

## 2018-11-27 DIAGNOSIS — I10 ESSENTIAL HYPERTENSION: ICD-10-CM

## 2018-11-27 DIAGNOSIS — M79.7 PRIMARY FIBROMYALGIA SYNDROME: Chronic | ICD-10-CM

## 2018-11-27 LAB
ALBUMIN SERPL-MCNC: 4.4 G/DL (ref 3.4–4.8)
ALBUMIN/GLOB SERPL: 1.6 G/DL (ref 1.1–1.8)
ALP SERPL-CCNC: 70 U/L (ref 38–126)
ALT SERPL W P-5'-P-CCNC: 28 U/L (ref 9–52)
AMPHET+METHAMPHET UR QL: NEGATIVE
ANION GAP SERPL CALCULATED.3IONS-SCNC: 9 MMOL/L (ref 5–15)
ARTICHOKE IGE QN: 131 MG/DL (ref 1–129)
AST SERPL-CCNC: 25 U/L (ref 14–36)
BACTERIA UR QL AUTO: ABNORMAL /HPF
BARBITURATES UR QL SCN: NEGATIVE
BASOPHILS # BLD AUTO: 0.02 10*3/MM3 (ref 0–0.2)
BASOPHILS NFR BLD AUTO: 0.5 % (ref 0–2)
BENZODIAZ UR QL SCN: NEGATIVE
BILIRUB SERPL-MCNC: 0.5 MG/DL (ref 0.2–1.3)
BILIRUB UR QL STRIP: NEGATIVE
BUN BLD-MCNC: 18 MG/DL (ref 7–21)
BUN/CREAT SERPL: 24.3 (ref 7–25)
CALCIUM SPEC-SCNC: 9 MG/DL (ref 8.4–10.2)
CANNABINOIDS SERPL QL: NEGATIVE
CHLORIDE SERPL-SCNC: 94 MMOL/L (ref 95–110)
CHOLEST SERPL-MCNC: 205 MG/DL (ref 0–199)
CLARITY UR: CLEAR
CO2 SERPL-SCNC: 29 MMOL/L (ref 22–31)
COCAINE UR QL: NEGATIVE
COLOR UR: YELLOW
CREAT BLD-MCNC: 0.74 MG/DL (ref 0.5–1)
DEPRECATED RDW RBC AUTO: 39.7 FL (ref 36.4–46.3)
EOSINOPHIL # BLD AUTO: 0.08 10*3/MM3 (ref 0–0.7)
EOSINOPHIL NFR BLD AUTO: 2 % (ref 0–7)
ERYTHROCYTE [DISTWIDTH] IN BLOOD BY AUTOMATED COUNT: 12.2 % (ref 11.5–14.5)
GFR SERPL CREATININE-BSD FRML MDRD: 79 ML/MIN/1.73 (ref 45–104)
GLOBULIN UR ELPH-MCNC: 2.7 GM/DL (ref 2.3–3.5)
GLUCOSE BLD-MCNC: 79 MG/DL (ref 60–100)
GLUCOSE UR STRIP-MCNC: NEGATIVE MG/DL
HCT VFR BLD AUTO: 35.4 % (ref 35–45)
HDLC SERPL-MCNC: 47 MG/DL (ref 60–200)
HGB BLD-MCNC: 12.2 G/DL (ref 12–15.5)
HGB UR QL STRIP.AUTO: NEGATIVE
HYALINE CASTS UR QL AUTO: ABNORMAL /LPF
IMM GRANULOCYTES # BLD: 0.02 10*3/MM3 (ref 0–0.02)
IMM GRANULOCYTES NFR BLD: 0.5 % (ref 0–0.5)
KETONES UR QL STRIP: NEGATIVE
LDLC/HDLC SERPL: 2.98 {RATIO} (ref 0–3.22)
LEUKOCYTE ESTERASE UR QL STRIP.AUTO: ABNORMAL
LYMPHOCYTES # BLD AUTO: 0.83 10*3/MM3 (ref 0.6–4.2)
LYMPHOCYTES NFR BLD AUTO: 20.8 % (ref 10–50)
MCH RBC QN AUTO: 30.7 PG (ref 26.5–34)
MCHC RBC AUTO-ENTMCNC: 34.5 G/DL (ref 31.4–36)
MCV RBC AUTO: 89.2 FL (ref 80–98)
METHADONE UR QL SCN: NEGATIVE
MONOCYTES # BLD AUTO: 0.48 10*3/MM3 (ref 0–0.9)
MONOCYTES NFR BLD AUTO: 12 % (ref 0–12)
NEUTROPHILS # BLD AUTO: 2.57 10*3/MM3 (ref 2–8.6)
NEUTROPHILS NFR BLD AUTO: 64.2 % (ref 37–80)
NITRITE UR QL STRIP: NEGATIVE
OPIATES UR QL: NEGATIVE
OXYCODONE UR QL SCN: NEGATIVE
PH UR STRIP.AUTO: 7 [PH] (ref 5–9)
PLATELET # BLD AUTO: 232 10*3/MM3 (ref 150–450)
PMV BLD AUTO: 10.3 FL (ref 8–12)
POTASSIUM BLD-SCNC: 4 MMOL/L (ref 3.5–5.1)
PROT SERPL-MCNC: 7.1 G/DL (ref 6.3–8.6)
PROT UR QL STRIP: NEGATIVE
RBC # BLD AUTO: 3.97 10*6/MM3 (ref 3.77–5.16)
RBC # UR: ABNORMAL /HPF
REF LAB TEST METHOD: ABNORMAL
SODIUM BLD-SCNC: 132 MMOL/L (ref 137–145)
SP GR UR STRIP: 1.02 (ref 1–1.03)
SQUAMOUS #/AREA URNS HPF: ABNORMAL /HPF
TRIGL SERPL-MCNC: 89 MG/DL (ref 20–199)
UROBILINOGEN UR QL STRIP: ABNORMAL
WBC NRBC COR # BLD: 4 10*3/MM3 (ref 3.2–9.8)
WBC UR QL AUTO: ABNORMAL /HPF

## 2018-11-27 PROCEDURE — 80307 DRUG TEST PRSMV CHEM ANLYZR: CPT

## 2018-11-27 PROCEDURE — 81001 URINALYSIS AUTO W/SCOPE: CPT

## 2018-11-27 PROCEDURE — 85025 COMPLETE CBC W/AUTO DIFF WBC: CPT

## 2018-11-27 PROCEDURE — 36415 COLL VENOUS BLD VENIPUNCTURE: CPT

## 2018-11-27 PROCEDURE — 80061 LIPID PANEL: CPT

## 2018-11-27 PROCEDURE — 80053 COMPREHEN METABOLIC PANEL: CPT

## 2018-11-28 ENCOUNTER — TELEPHONE (OUTPATIENT)
Dept: FAMILY MEDICINE CLINIC | Facility: CLINIC | Age: 68
End: 2018-11-28

## 2018-12-03 ENCOUNTER — OFFICE VISIT (OUTPATIENT)
Dept: FAMILY MEDICINE CLINIC | Facility: CLINIC | Age: 68
End: 2018-12-03

## 2018-12-03 VITALS
HEART RATE: 87 BPM | HEIGHT: 66 IN | WEIGHT: 136.2 LBS | BODY MASS INDEX: 21.89 KG/M2 | DIASTOLIC BLOOD PRESSURE: 80 MMHG | OXYGEN SATURATION: 99 % | SYSTOLIC BLOOD PRESSURE: 140 MMHG

## 2018-12-03 DIAGNOSIS — M79.7 PRIMARY FIBROMYALGIA SYNDROME: Chronic | ICD-10-CM

## 2018-12-03 DIAGNOSIS — I10 ESSENTIAL HYPERTENSION: Chronic | ICD-10-CM

## 2018-12-03 DIAGNOSIS — Z00.00 MEDICARE ANNUAL WELLNESS VISIT, INITIAL: ICD-10-CM

## 2018-12-03 DIAGNOSIS — M51.36 DEGENERATIVE DISC DISEASE, LUMBAR: Chronic | ICD-10-CM

## 2018-12-03 DIAGNOSIS — G47.00 INSOMNIA, UNSPECIFIED TYPE: Primary | Chronic | ICD-10-CM

## 2018-12-03 PROCEDURE — 99214 OFFICE O/P EST MOD 30 MIN: CPT | Performed by: GENERAL PRACTICE

## 2018-12-03 PROCEDURE — G0438 PPPS, INITIAL VISIT: HCPCS | Performed by: GENERAL PRACTICE

## 2018-12-03 RX ORDER — DILTIAZEM HYDROCHLORIDE 60 MG/1
60 TABLET, FILM COATED ORAL 2 TIMES DAILY
Qty: 180 TABLET | Refills: 3 | Status: SHIPPED | OUTPATIENT
Start: 2018-12-03 | End: 2019-06-07

## 2018-12-03 RX ORDER — LORAZEPAM 1 MG/1
1 TABLET ORAL NIGHTLY
Qty: 120 TABLET | Refills: 0 | Status: SHIPPED | OUTPATIENT
Start: 2018-12-03 | End: 2019-03-05 | Stop reason: SDUPTHER

## 2018-12-03 NOTE — PATIENT INSTRUCTIONS
Check at pharmacy on Shingrix shingles vaccine    Medicare Wellness  Personal Prevention Plan of Service     Date of Office Visit:  2018  Encounter Provider:  Anay Thomson MD  Place of Service:  [facility]  Patient Name: Angella Baez  :  1950    As part of the Medicare Wellness portion of your visit today, we are providing you with this personalized preventive plan of services (PPPS). This plan is based upon recommendations of the [place] Preventive Services Task Force (USPSTF) and the Advisory Committee on Immunization Practices (ACIP).    This lists the preventive care services that should be considered, and provides dates of when you are due. Items listed as completed are up-to-date and do not require any further intervention.    Health Maintenance   Topic Date Due   • ZOSTER VACCINE (2 of 2) 2017   • DXA SCAN  2017   • MEDICARE ANNUAL WELLNESS  2018   • MAMMOGRAM  2018   • LIPID PANEL  2019   • COLOGUARD  2021   • PNEUMOCOCCAL VACCINES (65+ LOW/MEDIUM RISK) (2 of 2 - PPSV23) 10/07/2021   • TDAP/TD VACCINES (2 - Td) 2027   • HEPATITIS C SCREENING  Completed   • INFLUENZA VACCINE  Completed   • COLONOSCOPY  Discontinued       No orders of the defined types were placed in this encounter.      Return in about 3 months (around 3/3/2019) for Recheck.

## 2018-12-03 NOTE — PROGRESS NOTES
QUICK REFERENCE INFORMATION:  The ABCs of the Annual Wellness Visit    Welcome to Medicare Visit    HEALTH RISK ASSESSMENT    1950    Recent Hospitalizations:  No hospitalization(s) within the last year..      Current Medical Providers:  Patient Care Team:  Anay Thomson MD as PCP - General (Family Medicine)  Anay Thomson MD as PCP - Claims Attributed  Marty Purcell MD as Consulting Physician (Dermatology)  Henry Estrada DO as Consulting Physician (Gastroenterology)      Smoking Status:  Social History     Tobacco Use   Smoking Status Never Smoker   Smokeless Tobacco Never Used       Alcohol Consumption:  Social History     Substance and Sexual Activity   Alcohol Use No       Depression Screen:   PHQ-2/PHQ-9 Depression Screening 12/3/2018   Little interest or pleasure in doing things 0   Feeling down, depressed, or hopeless 1   Trouble falling or staying asleep, or sleeping too much 0   Feeling tired or having little energy 1   Poor appetite or overeating 0   Feeling bad about yourself - or that you are a failure or have let yourself or your family down 0   Trouble concentrating on things, such as reading the newspaper or watching television 0   Moving or speaking so slowly that other people could have noticed. Or the opposite - being so fidgety or restless that you have been moving around a lot more than usual 0   Thoughts that you would be better off dead, or of hurting yourself in some way 0   Total Score 2   If you checked off any problems, how difficult have these problems made it for you to do your work, take care of things at home, or get along with other people? -       Health Habits and Functional and Cognitive Screening:  Functional & Cognitive Status 12/3/2018   Do you have difficulty preparing food and eating? No   Do you have difficulty bathing yourself, getting dressed or grooming yourself? No   Do you have difficulty using the toilet? No   Do you have difficulty moving around  from place to place? No   Do you have trouble with steps or getting out of a bed or a chair? No   In the past year have you fallen or experienced a near fall? No   Current Diet Well Balanced Diet   Dental Exam Up to date   Eye Exam Not up to date   Exercise (times per week) 5 times per week   Current Exercise Activities Include Walking   Do you need help using the phone?  No   Are you deaf or do you have serious difficulty hearing?  No   Do you need help with transportation? No   Do you need help shopping? No   Do you need help preparing meals?  No   Do you need help with housework?  No   Do you need help with laundry? No   Do you need help taking your medications? No   Do you need help managing money? No   Do you ever drive or ride in a car without wearing a seat belt? No   Have you felt unusual stress, anger or loneliness in the last month? No   Who do you live with? Spouse   If you need help, do you have trouble finding someone available to you? No   Have you been bothered in the last four weeks by sexual problems? No   Do you have difficulty concentrating, remembering or making decisions? No           Does the patient have evidence of cognitive impairment? No    Aspirin use counseling? Does not need ASA (and currently is not on it)      Recent Lab Results:  CMP:  Lab Results   Component Value Date    BUN 18 11/27/2018    CREATININE 0.74 11/27/2018    EGFRIFNONA 79 11/27/2018    BCR 24.3 11/27/2018     (L) 11/27/2018    K 4.0 11/27/2018    CO2 29.0 11/27/2018    CALCIUM 9.0 11/27/2018    ALBUMIN 4.40 11/27/2018    BILITOT 0.5 11/27/2018    ALKPHOS 70 11/27/2018    AST 25 11/27/2018    ALT 28 11/27/2018     Lipid Panel:  Lab Results   Component Value Date    CHOL 205 (H) 11/27/2018    TRIG 89 11/27/2018    HDL 47 (L) 11/27/2018    LDLHDL 2.98 11/27/2018     HbA1c:       Visual Acuity:   Visual Acuity Screening    Right eye Left eye Both eyes   Without correction: 20/40 20/25/ 20/25   With correction: 20/20  20/30 20/20       Age-appropriate Screening Schedule:  Refer to the list below for future screening recommendations based on patient's age, sex and/or medical conditions. Orders for these recommended tests are listed in the plan section. The patient has been provided with a written plan.    Health Maintenance   Topic Date Due   • ZOSTER VACCINE (2 of 2) 04/06/2017   • DXA SCAN  11/21/2017   • MAMMOGRAM  11/22/2018   • LIPID PANEL  11/27/2019   • PNEUMOCOCCAL VACCINES (65+ LOW/MEDIUM RISK) (2 of 2 - PPSV23) 10/07/2021   • TDAP/TD VACCINES (2 - Td) 11/05/2027   • INFLUENZA VACCINE  Completed   • COLONOSCOPY  Discontinued        Subjective   History of Present Illness    Angella Baez is a 68 y.o. female an established patient presenting for a Welcome to Medicare Visit.     The following portions of the patient's history were reviewed and updated as appropriate: allergies, current medications, past family history, past medical history, past social history, past surgical history and problem list.    Outpatient Medications Prior to Visit   Medication Sig Dispense Refill   • desoximetasone (TOPICORT) 0.25 % cream      • docusate sodium (COLACE) 100 MG capsule Take 100 mg by mouth every night. Takes 4 cap nightly     • gabapentin (NEURONTIN) 300 MG capsule 1 bid and 2 qhs 270 capsule 0   • Magnesium 250 MG tablet Take 4 tablets by mouth.     • spironolactone (ALDACTONE) 25 MG tablet Take 2 tablets by mouth Daily. 180 tablet 3   • traMADol (ULTRAM) 50 MG tablet Take 1 tablet by mouth 2 (Two) Times a Day As Needed for Moderate Pain . 180 tablet 0   • diltiaZEM (CARDIZEM) 60 MG tablet Take 1 tablet by mouth 2 (Two) Times a Day. 180 tablet 3   • LORazepam (ATIVAN) 1 MG tablet Take 1 tablet by mouth Every Night. May repeat x 1 prn 120 tablet 0     No facility-administered medications prior to visit.        Patient Active Problem List   Diagnosis   • Depressive disorder   • Essential hypertension   • Primary  "fibromyalgia syndrome   • Insomnia   • Acute midline low back pain with right-sided sciatica   • Degenerative disc disease, lumbar       Advance Care Planning:  has NO advance directive - information provided to the patient today    Identification of Risk Factors:  Risk factors include: unhealthy diet, inactivity, increased fall risk and chronic pain.    Review of Systems    Compared to one year ago, the patient feels her physical health is worse.  Compared to one year ago, the patient feels her mental health is the same.    Objective    Physical Exam   Constitutional: She is oriented to person, place, and time. She appears well-developed and well-nourished. No distress.   HENT:   Head: Normocephalic and atraumatic.   Nose: Nose normal.   Mouth/Throat: Oropharynx is clear and moist.   Eyes: Conjunctivae and EOM are normal. Pupils are equal, round, and reactive to light. Right eye exhibits no discharge. Left eye exhibits no discharge.   Neck: No thyromegaly present.   Cardiovascular: Normal rate, regular rhythm, normal heart sounds and intact distal pulses.   Pulmonary/Chest: Effort normal and breath sounds normal.   Lymphadenopathy:     She has no cervical adenopathy.   Neurological: She is alert and oriented to person, place, and time.   Skin: Skin is warm and dry.   Psychiatric: She has a normal mood and affect.   Nursing note and vitals reviewed.      Vitals:    12/03/18 1126   BP: 140/80   Pulse: 87   SpO2: 99%   Weight: 61.8 kg (136 lb 3.2 oz)   Height: 167.6 cm (66\")   PainSc:   2   PainLoc: Generalized       Patient's Body mass index is 21.98 kg/m². BMI is within normal parameters. No follow-up required.      Procedure   Procedures       Assessment/Plan   Patient Self-Management and Personalized Health Advice  The patient has been provided with information about: diet, exercise, weight management and fall prevention and preventive services including:   · Advance directive, Exercise counseling provided, Fall " Risk assessment done, Fall Risk plan of care done, Influenza vaccine, Pneumococcal vaccine , Zostavax vaccine (Herpes Zoster).    Visit Diagnoses:    ICD-10-CM ICD-9-CM   1. Insomnia, unspecified type G47.00 780.52   2. Essential hypertension I10 401.9   3. Primary fibromyalgia syndrome M79.7 729.1   4. Degenerative disc disease, lumbar M51.36 722.52   5. Medicare annual wellness visit, initial Z00.00 V70.0       No orders of the defined types were placed in this encounter.      Outpatient Encounter Medications as of 12/3/2018   Medication Sig Dispense Refill   • desoximetasone (TOPICORT) 0.25 % cream      • diltiaZEM (CARDIZEM) 60 MG tablet Take 1 tablet by mouth 2 (Two) Times a Day. 180 tablet 3   • docusate sodium (COLACE) 100 MG capsule Take 100 mg by mouth every night. Takes 4 cap nightly     • gabapentin (NEURONTIN) 300 MG capsule 1 bid and 2 qhs 270 capsule 0   • LORazepam (ATIVAN) 1 MG tablet Take 1 tablet by mouth Every Night. May repeat x 1 prn 120 tablet 0   • Magnesium 250 MG tablet Take 4 tablets by mouth.     • spironolactone (ALDACTONE) 25 MG tablet Take 2 tablets by mouth Daily. 180 tablet 3   • traMADol (ULTRAM) 50 MG tablet Take 1 tablet by mouth 2 (Two) Times a Day As Needed for Moderate Pain . 180 tablet 0   • [DISCONTINUED] diltiaZEM (CARDIZEM) 60 MG tablet Take 1 tablet by mouth 2 (Two) Times a Day. 180 tablet 3   • [DISCONTINUED] LORazepam (ATIVAN) 1 MG tablet Take 1 tablet by mouth Every Night. May repeat x 1 prn 120 tablet 0     No facility-administered encounter medications on file as of 12/3/2018.        Reviewed use of high risk medication in the elderly: yes  Reviewed for potential of harmful drug interactions in the elderly: not applicable    Follow Up:  Return in about 3 months (around 3/3/2019) for Recheck.     An After Visit Summary and PPPS with all of these plans were given to the patient.    Information has been scanned into chart.  Discussed importance of taking medications as  prescribed. Encouraged healthy eating habits with low fat, low salt choices and working towards maintaining a healthy weight. Recommended regular exercise if able as well as care to prevent falls.

## 2018-12-03 NOTE — PROGRESS NOTES
Subjective   Angella Baez is a 68 y.o. female.     Chief Complaint   Patient presents with   • Annual Exam     labs [facility] medicare wellness     For review and evaluation of management of chronic medical problems. Labs reviewed. Due for mammogram and bone density. Has an appt for an EMG.  Hypertension   This is a chronic problem. The current episode started more than 1 year ago. The problem is unchanged. The problem is controlled. Pertinent negatives include no chest pain, headaches, neck pain, palpitations or shortness of breath. There are no associated agents to hypertension. Current antihypertension treatment includes calcium channel blockers and diuretics. The current treatment provides moderate improvement. There are no compliance problems.    Fibromyalgia   This is a chronic problem. The current episode started more than 1 year ago. The problem occurs constantly. The problem has been unchanged. Associated symptoms include myalgias. Pertinent negatives include no abdominal pain, arthralgias, chest pain, chills, congestion, coughing, fatigue, fever, headaches, joint swelling, nausea, neck pain, numbness, rash, sore throat, vomiting or weakness. The symptoms are aggravated by stress. Treatments tried: gabapentin. The treatment provided moderate relief.   Insomnia   This is a chronic problem. The current episode started more than 1 year ago. The problem occurs constantly. The problem has been unchanged. Associated symptoms include myalgias. Pertinent negatives include no abdominal pain, arthralgias, chest pain, chills, congestion, coughing, fatigue, fever, headaches, joint swelling, nausea, neck pain, numbness, rash, sore throat, vomiting or weakness. The symptoms are aggravated by stress. Treatments tried: lorazepam. The treatment provided significant relief.   Back Pain   This is a chronic problem. The current episode started more than 1 year ago. The problem occurs constantly. The problem is  unchanged. The pain is present in the lumbar spine and gluteal. The pain radiates to the right foot and left foot. The pain is at a severity of 2/10. The pain is moderate. The pain is worse during the day. The symptoms are aggravated by bending and standing. Stiffness is present in the morning. Pertinent negatives include no abdominal pain, chest pain, dysuria, fever, headaches, numbness or weakness. She has tried analgesics (gabapentin, epidural injection) for the symptoms. The treatment provided moderate relief.      Procedure: MRI lumbar spine without contrast     Reason for exam: Low back pain.     FINDINGS: Multisequence multiplanar MR imaging of the lumbar  spine was performed without contrast. Lumbar spine vertebral body  heights and alignment are normal. There is no evidence of  fracture or dislocation identified. No abnormal marrow signal.  Conus of the spinal cord appears normal with tip at the T12-L1  intervertebral disc space.     T12-L1: Disc space normal. No disc protrusion or extrusion.  Spinal cord and nerve roots exit without compromise.     L1-L2: Very mild diffuse disc protrusion minimally indenting the  anterior thecal sac. Bilateral neural foraminal circular low  signal lesions on T1 weighting which are higher signal on T2  weighting with these lesions measuring 6.3 mm in greatest  diameter. These lesions have imaging characteristics most  consistent with perineural cyst. Nerve roots appear to exit this  disc space level without compromise.     L2-L3: Very mild diffuse disc protrusion minimally indenting the  anterior thecal sac. Nerve roots exit without compromise.     L3-L4: Disc space normal. No disc protrusion or extrusion.  Bilateral L3-4 neural foraminal small oval low signal lesions on  T1 weighting which are higher signal T2-weighting consistent with  small perineural cysts with the larger one measuring 5 mm in  greatest diameter. Nerve roots appear to exit this disc space  level without  compromise.     L4-L5: Disc space normal. No disc protrusion or extrusion. Nerve  roots exit without compromise.     L5-S1: Small perineural cysts involving bilateral S1 nerve roots  and the right L5 nerve root. Disc space normal. No disc  protrusion or extrusion. Nerve roots exit without compromise.     IMPRESSION:  1.  No acute MR lumbar spine abnormality.  2.  L1-L2: Very mild diffuse disc protrusion minimally indenting  the anterior thecal sac. Bilateral neural foraminal circular low  signal lesions on T1 weighting which are higher signal on T2  weighting with these lesions measuring 6.3 mm in greatest  diameter. These lesions have imaging characteristics most  consistent with perineural cyst. Nerve roots appear to exit this  disc space level without compromise.  3.  L3-L4: Disc space normal. No disc protrusion or extrusion.  Bilateral L3-4 neural foraminal small oval low signal lesions on  T1 weighting which are higher signal T2-weighting consistent with  small perineural cysts with the larger one measuring 5 mm in  greatest diameter. Nerve roots appear to exit this disc space  level without compromise.  4.  L2-L3: Very mild diffuse disc protrusion minimally indenting  the anterior thecal sac. Nerve roots exit without compromise.  5.  L5-S1: Small perineural cysts involving bilateral S1 nerve  roots and the right L5 nerve root. Disc space normal. No disc  protrusion or extrusion. Nerve roots exit without compromise.     Electronically signed by:  Ramirez Ulloa MD  10/2/2018 8:24 AM CDT  Workstation: [id number]  The following portions of the patient's history were reviewed and updated as appropriate: allergies, current medications, past family and social history and problem list.    Outpatient Medications Prior to Visit   Medication Sig Dispense Refill   • desoximetasone (TOPICORT) 0.25 % cream      • docusate sodium (COLACE) 100 MG capsule Take 100 mg by mouth every night. Takes 4 cap nightly     • gabapentin  "(NEURONTIN) 300 MG capsule 1 bid and 2 qhs 270 capsule 0   • Magnesium 250 MG tablet Take 4 tablets by mouth.     • spironolactone (ALDACTONE) 25 MG tablet Take 2 tablets by mouth Daily. 180 tablet 3   • traMADol (ULTRAM) 50 MG tablet Take 1 tablet by mouth 2 (Two) Times a Day As Needed for Moderate Pain . 180 tablet 0   • diltiaZEM (CARDIZEM) 60 MG tablet Take 1 tablet by mouth 2 (Two) Times a Day. 180 tablet 3   • LORazepam (ATIVAN) 1 MG tablet Take 1 tablet by mouth Every Night. May repeat x 1 prn 120 tablet 0     No facility-administered medications prior to visit.        Review of Systems   Constitutional: Negative.  Negative for chills, fatigue, fever and unexpected weight change.   HENT: Negative.  Negative for congestion, ear pain, hearing loss, nosebleeds, rhinorrhea, sneezing, sore throat and tinnitus.    Eyes: Negative.  Negative for discharge.   Respiratory: Negative.  Negative for cough, shortness of breath and wheezing.    Cardiovascular: Negative.  Negative for chest pain and palpitations.   Gastrointestinal: Negative.  Negative for abdominal pain, constipation, diarrhea, nausea and vomiting.   Endocrine: Negative.    Genitourinary: Negative.  Negative for dysuria, frequency and urgency.   Musculoskeletal: Positive for back pain and myalgias. Negative for arthralgias, joint swelling and neck pain.   Skin: Negative.  Negative for rash.   Allergic/Immunologic: Negative.    Neurological: Negative.  Negative for dizziness, weakness, numbness and headaches.   Hematological: Negative.  Negative for adenopathy.   Psychiatric/Behavioral: Negative for dysphoric mood and sleep disturbance. The patient has insomnia. The patient is not nervous/anxious.        Objective     Visit Vitals  /80   Pulse 87   Ht 167.6 cm (66\")   Wt 61.8 kg (136 lb 3.2 oz)   SpO2 99%   BMI 21.98 kg/m²     Physical Exam   Constitutional: She is oriented to person, place, and time. She appears well-developed and well-nourished. No " distress.   HENT:   Head: Normocephalic and atraumatic.   Nose: Nose normal.   Mouth/Throat: Oropharynx is clear and moist.   Eyes: Conjunctivae and EOM are normal. Pupils are equal, round, and reactive to light. Right eye exhibits no discharge. Left eye exhibits no discharge.   Neck: No tracheal deviation present. No thyromegaly present.   Cardiovascular: Normal rate, regular rhythm, normal heart sounds and intact distal pulses.   No murmur heard.  Pulmonary/Chest: Effort normal and breath sounds normal. No respiratory distress. She has no wheezes. She has no rales. She exhibits no tenderness. Right breast exhibits no inverted nipple, no mass, no nipple discharge, no skin change and no tenderness. Left breast exhibits no inverted nipple, no mass, no nipple discharge, no skin change and no tenderness.   Abdominal: Soft. Bowel sounds are normal. She exhibits no distension and no mass. There is no tenderness. No hernia.   Musculoskeletal: She exhibits no deformity.        Lumbar back: She exhibits decreased range of motion and tenderness.   Lymphadenopathy:     She has no cervical adenopathy.   Neurological: She is alert and oriented to person, place, and time. She has normal reflexes.   Skin: Skin is warm and dry.   Psychiatric: She has a normal mood and affect. Her behavior is normal. Judgment and thought content normal.   Nursing note and vitals reviewed.    Results for orders placed or performed in visit on 11/27/18   Urine Drug Screen - Urine, Clean Catch   Result Value Ref Range    Amphetamine Screen, Urine Negative Negative    Barbiturates Screen, Urine Negative Negative    Benzodiazepine Screen, Urine Negative Negative    Cocaine Screen, Urine Negative Negative    Methadone Screen, Urine Negative Negative    Opiate Screen Negative Negative    Oxycodone Screen, Urine Negative Negative    THC, Screen, Urine Negative Negative   Comprehensive Metabolic Panel   Result Value Ref Range    Glucose 79 60 - 100 mg/dL     BUN 18 7 - 21 mg/dL    Creatinine 0.74 0.50 - 1.00 mg/dL    Sodium 132 (L) 137 - 145 mmol/L    Potassium 4.0 3.5 - 5.1 mmol/L    Chloride 94 (L) 95 - 110 mmol/L    CO2 29.0 22.0 - 31.0 mmol/L    Calcium 9.0 8.4 - 10.2 mg/dL    Total Protein 7.1 6.3 - 8.6 g/dL    Albumin 4.40 3.40 - 4.80 g/dL    ALT (SGPT) 28 9 - 52 U/L    AST (SGOT) 25 14 - 36 U/L    Alkaline Phosphatase 70 38 - 126 U/L    Total Bilirubin 0.5 0.2 - 1.3 mg/dL    eGFR Non  Amer 79 45 - 104 mL/min/1.73    Globulin 2.7 2.3 - 3.5 gm/dL    A/G Ratio 1.6 1.1 - 1.8 g/dL    BUN/Creatinine Ratio 24.3 7.0 - 25.0    Anion Gap 9.0 5.0 - 15.0 mmol/L   Lipid Panel   Result Value Ref Range    Total Cholesterol 205 (H) 0 - 199 mg/dL    Triglycerides 89 20 - 199 mg/dL    HDL Cholesterol 47 (L) 60 - 200 mg/dL    LDL Cholesterol  131 (H) 1 - 129 mg/dL    LDL/HDL Ratio 2.98 0.00 - 3.22   CBC Auto Differential   Result Value Ref Range    WBC 4.00 3.20 - 9.80 10*3/mm3    RBC 3.97 3.77 - 5.16 10*6/mm3    Hemoglobin 12.2 12.0 - 15.5 g/dL    Hematocrit 35.4 35.0 - 45.0 %    MCV 89.2 80.0 - 98.0 fL    MCH 30.7 26.5 - 34.0 pg    MCHC 34.5 31.4 - 36.0 g/dL    RDW 12.2 11.5 - 14.5 %    RDW-SD 39.7 36.4 - 46.3 fl    MPV 10.3 8.0 - 12.0 fL    Platelets 232 150 - 450 10*3/mm3    Neutrophil % 64.2 37.0 - 80.0 %    Lymphocyte % 20.8 10.0 - 50.0 %    Monocyte % 12.0 0.0 - 12.0 %    Eosinophil % 2.0 0.0 - 7.0 %    Basophil % 0.5 0.0 - 2.0 %    Immature Grans % 0.5 0.0 - 0.5 %    Neutrophils, Absolute 2.57 2.00 - 8.60 10*3/mm3    Lymphocytes, Absolute 0.83 0.60 - 4.20 10*3/mm3    Monocytes, Absolute 0.48 0.00 - 0.90 10*3/mm3    Eosinophils, Absolute 0.08 0.00 - 0.70 10*3/mm3    Basophils, Absolute 0.02 0.00 - 0.20 10*3/mm3    Immature Grans, Absolute 0.02 0.00 - 0.02 10*3/mm3   Urinalysis without microscopic (no culture) - Urine, Clean Catch   Result Value Ref Range    Color, UA Yellow Yellow, Straw, Dark Yellow, Judi    Appearance, UA Clear Clear    pH, UA 7.0 5.0 - 9.0     Specific Gravity, UA 1.023 1.003 - 1.030    Glucose, UA Negative Negative    Ketones, UA Negative Negative    Bilirubin, UA Negative Negative    Blood, UA Negative Negative    Protein, UA Negative Negative    Leuk Esterase, UA Small (1+) (A) Negative    Nitrite, UA Negative Negative    Urobilinogen, UA 0.2 E.U./dL 0.2 - 1.0 E.U./dL   Urinalysis, Microscopic Only - Urine, Clean Catch   Result Value Ref Range    RBC, UA 0-2 (A) None Seen /HPF    WBC, UA 3-5 None Seen, 0-2, 3-5 /HPF    Bacteria, UA None Seen None Seen /HPF    Squamous Epithelial Cells, UA 3-5 (A) None Seen, 0-2 /HPF    Hyaline Casts, UA 0-2 None Seen /LPF    Methodology Automated Microscopy       Assessment/Plan   Problem List Items Addressed This Visit        Cardiovascular and Mediastinum    Essential hypertension (Chronic)    Relevant Medications    diltiaZEM (CARDIZEM) 60 MG tablet       Nervous and Auditory    Primary fibromyalgia syndrome (Chronic)       Musculoskeletal and Integument    Degenerative disc disease, lumbar (Chronic)       Other    Insomnia - Primary (Chronic)    Relevant Medications    LORazepam (ATIVAN) 1 MG tablet      Other Visit Diagnoses     Medicare annual wellness visit, initial              Will notify regarding results. Continue current treatment.     New Medications Ordered This Visit   Medications   • LORazepam (ATIVAN) 1 MG tablet     Sig: Take 1 tablet by mouth Every Night. May repeat x 1 prn     Dispense:  120 tablet     Refill:  0   • diltiaZEM (CARDIZEM) 60 MG tablet     Sig: Take 1 tablet by mouth 2 (Two) Times a Day.     Dispense:  180 tablet     Refill:  3     Return in about 3 months (around 3/3/2019) for Recheck.

## 2018-12-12 ENCOUNTER — TELEPHONE (OUTPATIENT)
Dept: FAMILY MEDICINE CLINIC | Facility: CLINIC | Age: 68
End: 2018-12-12

## 2018-12-12 DIAGNOSIS — M81.0 OSTEOPOROSIS WITHOUT CURRENT PATHOLOGICAL FRACTURE, UNSPECIFIED OSTEOPOROSIS TYPE: Primary | ICD-10-CM

## 2018-12-12 NOTE — TELEPHONE ENCOUNTER
Pr Dr. Thomson, Ms. Baez has been called with her recent DEXA Bone Density results & recommendations.  Continue her current medications and follow-up as planned or sooner if any problems.    Referral sent to Bone Health      ----- Message from Anay Thomson MD sent at 12/12/2018  1:53 PM CST -----  Call and tell bone density shows ostial ischemia but is almost into the osteoporosis range.  I would like her to be seen at the bone health clinic to discuss treatment and prevention of further worsening.

## 2019-01-03 DIAGNOSIS — M79.7 PRIMARY FIBROMYALGIA SYNDROME: Chronic | ICD-10-CM

## 2019-01-03 RX ORDER — GABAPENTIN 300 MG/1
CAPSULE ORAL
Qty: 270 CAPSULE | Refills: 0 | OUTPATIENT
Start: 2019-01-03 | End: 2019-03-05 | Stop reason: SDUPTHER

## 2019-01-03 NOTE — TELEPHONE ENCOUNTER
Dr. Thomson,     Ms. Angella Baez is requesting a refill on her Gabapentin 300 mg #270 Take 1 CAP twice day and Take 2 Capsules HS.    Last OV 12/03/18    Next Priyanka OV 03/05/19    Last Script Written 08/30/18  #270 with No Refills    Last INOCENCIA  12/03/18    Please Advise    Thank you

## 2019-01-04 NOTE — TELEPHONE ENCOUNTER
Script has been called to [facility].      Changed to Phone In and sent back to Dr. Thomson for signature

## 2019-01-14 ENCOUNTER — OFFICE VISIT (OUTPATIENT)
Dept: ORTHOPEDIC SURGERY | Facility: CLINIC | Age: 69
End: 2019-01-14

## 2019-01-14 VITALS — HEIGHT: 66 IN | WEIGHT: 139.9 LBS | BODY MASS INDEX: 22.48 KG/M2

## 2019-01-14 DIAGNOSIS — M79.652 PAIN IN BOTH THIGHS: ICD-10-CM

## 2019-01-14 DIAGNOSIS — M79.651 PAIN IN BOTH THIGHS: ICD-10-CM

## 2019-01-14 DIAGNOSIS — M85.80 OSTEOPENIA, UNSPECIFIED LOCATION: Primary | ICD-10-CM

## 2019-01-14 PROCEDURE — 99214 OFFICE O/P EST MOD 30 MIN: CPT | Performed by: NURSE PRACTITIONER

## 2019-01-14 RX ORDER — MELOXICAM 15 MG/1
15 TABLET ORAL DAILY
Qty: 30 TABLET | Refills: 3 | Status: SHIPPED | OUTPATIENT
Start: 2019-01-14 | End: 2020-01-27 | Stop reason: SDUPTHER

## 2019-01-14 NOTE — PROGRESS NOTES
Angella Baez is a 68 y.o. female returns for     Chief Complaint   Patient presents with   • Osteoporosis       HISTORY OF PRESENT ILLNESS: Patient presents to [facility] for bone health evaluation and osteoporosis screening.  Patient had a recent bone density study done on 12/3/2018, which was positive for osteopenia.  Patient was referred by her primary care physician, Dr. Thomson.  Patient has had no prior treatment for osteoporosis/osteopenia.  Patient has a history of a right distal radius fracture in February 2014 due to a fall.  No other history of fractures.  Patient denies any decrease in her height over the last several years.  There is no known family history of osteoporosis.  Patient denies any comorbid medical conditions associated with an increased risk for osteoporosis.  No long-term use of steroids is reported.  There are no high-risk medications associated with an increased risk for osteoporosis identified.  Patient is a nonsmoker that has never smoked.  Patient is a postmenopausal  female.    Patient also complains of chronic pain in her bilateral thighs/upper legs that has been ongoing for several years (10+) and continues to progressively worsen.  It was initially thought that the pain was radicular in nature as the patient has some degenerative disc disease in her lumbar spine.  However, patient has had 2 prior lumbar epidural injections in November 2018 per Dr. Garcia that have not improved or changed her pain at all.  Patient complains of pain and tenderness in both the anterior and posterior aspects of her upper legs that worsens following activity, even non-weightbearing activity (swimming).  The pain is described as burning and stabbing in nature with occasional/intermittent numbness in her right toes.  Patient has had a prior MRI of her lumbar spine, which showed some degenerative disc disease but no definitive nerve compression.  Patient has had previous  "EMG-NCS, which did not show any evidence of nerve impingement or muscle abnormality.  Patient has been taking Neurontin for some time now, which initially did improve her leg pain then it became less effective over time.  Patient then started taking Tramadol to improve the pain but has had to increase her dose as the pain has continued to progress.  The patient expresses frustration with her lack of diagnosis and lack of improvement.  She denies any back pain at this time.  She primarily only complains of pain in the upper legs/femurs/quadriceps/hamstrings.  The patient is also currently doing physical therapy with no improvement yet.     CONCURRENT MEDICAL HISTORY:    The following portions of the patient's history were reviewed and updated as appropriate: allergies, current medications, past family history, past medical history, past social history, past surgical history and problem list.     ROS  No fevers or chills.  No chest pain or shortness of air.  No GI or  disturbances. Right upper leg pain. Left upper leg pain.     PHYSICAL EXAMINATION:       Ht 166.4 cm (65.5\")   Wt 63.5 kg (139 lb 14.4 oz)   BMI 22.93 kg/m²     Physical Exam   Constitutional: She is oriented to person, place, and time. Vital signs are normal. She appears well-developed and well-nourished. She is active and cooperative. She does not appear ill. No distress.   HENT:   Head: Normocephalic.   Pulmonary/Chest: Effort normal. No respiratory distress.   Abdominal: Soft. She exhibits no distension.   Musculoskeletal: She exhibits tenderness (Right hamstring, Left hamstring). She exhibits no edema or deformity.   Neurological: She is alert and oriented to person, place, and time. GCS eye subscore is 4. GCS verbal subscore is 5. GCS motor subscore is 6.   Skin: Skin is warm, dry and intact. Capillary refill takes less than 2 seconds. No erythema.   Psychiatric: She has a normal mood and affect. Her speech is normal and behavior is normal. " Judgment and thought content normal. Cognition and memory are normal.   Vitals reviewed.      GAIT:     [x]  Normal  []  Antalgic    Assistive device: [x]  None  []  Walker     []  Crutches  []  Cane     []  Wheelchair  []  Stretcher    Back Exam     Tenderness   The patient is experiencing no tenderness.     Range of Motion   The patient has normal back ROM.    Muscle Strength   The patient has normal back strength.    Tests   Straight leg raise right: negative  Straight leg raise left: negative    Reflexes   Patellar: normal    Other   Sensation: normal  Gait: normal   Erythema: no back redness    Comments:  No pain or tenderness of the spine with range of motion.  No focal neurological deficits noted currently.  Mild tenderness to palpation of the bilateral hamstring muscles, including the tendon insertion sites distally.  No swelling.  No ecchymosis.  No erythema.  No visible abnormalities noted.              DEXA scan     HISTORY: Osteoporosis screening postmenopausal     Scans were obtained at the L1-L4 levels and of each hip.     Average bone mineral density for the lumbar spine is 0.803 g/sq  cm. The T score of -2.2 corresponds to a WHO of classification of  osteopenia. (Note is made the L4 T score of -2.8 corresponds to  osteoporosis. Fracture risk for the L4 vertebral body height.)     Change of -4.0% from 11/5/2015.      The bone mineral density for the left femoral neck is 0.596 g/sq  cm. The T score of -2.3 corresponds to a WHO classification of  osteopenia.     The bone mineral density for the right femoral neck is 0.618 g/sq  cm. The T score of -2.1 corresponds to a WHO classification of  osteopenia.     Change of 5.7% in mean total hip bone density from 11/5/2015.     Ten-year probability of fracture based on risk factors and  femoral neck bone mineral density is as follows:   Major osteoporotic fracture 18 %. Hip fracture 4.0 %.     IMPRESSION:  CONCLUSION:  Average lumbar spine bone density and  femoral neck bone densities  correspond to osteopenia. Fracture risk increased.  (Note is made the L4 T score of -2.8 corresponds to osteoporosis.  Fracture risk for the L4 vertebral body height.)     Electronically signed by:  Corby Fitzpatrick MD  12/3/2018 1:54 PM CST  Workstation: [id number]    MRI lumbar spine without contrast     Reason for exam: Low back pain.     FINDINGS: Multisequence multiplanar MR imaging of the lumbar  spine was performed without contrast. Lumbar spine vertebral body  heights and alignment are normal. There is no evidence of  fracture or dislocation identified. No abnormal marrow signal.  Conus of the spinal cord appears normal with tip at the T12-L1  intervertebral disc space.     T12-L1: Disc space normal. No disc protrusion or extrusion.  Spinal cord and nerve roots exit without compromise.     L1-L2: Very mild diffuse disc protrusion minimally indenting the  anterior thecal sac. Bilateral neural foraminal circular low  signal lesions on T1 weighting which are higher signal on T2  weighting with these lesions measuring 6.3 mm in greatest  diameter. These lesions have imaging characteristics most  consistent with perineural cyst. Nerve roots appear to exit this  disc space level without compromise.     L2-L3: Very mild diffuse disc protrusion minimally indenting the  anterior thecal sac. Nerve roots exit without compromise.     L3-L4: Disc space normal. No disc protrusion or extrusion.  Bilateral L3-4 neural foraminal small oval low signal lesions on  T1 weighting which are higher signal T2-weighting consistent with  small perineural cysts with the larger one measuring 5 mm in  greatest diameter. Nerve roots appear to exit this disc space  level without compromise.     L4-L5: Disc space normal. No disc protrusion or extrusion. Nerve  roots exit without compromise.     L5-S1: Small perineural cysts involving bilateral S1 nerve roots  and the right L5 nerve root. Disc space normal. No  disc  protrusion or extrusion. Nerve roots exit without compromise.     IMPRESSION:  1.  No acute MR lumbar spine abnormality.  2.  L1-L2: Very mild diffuse disc protrusion minimally indenting  the anterior thecal sac. Bilateral neural foraminal circular low  signal lesions on T1 weighting which are higher signal on T2  weighting with these lesions measuring 6.3 mm in greatest  diameter. These lesions have imaging characteristics most  consistent with perineural cyst. Nerve roots appear to exit this  disc space level without compromise.  3.  L3-L4: Disc space normal. No disc protrusion or extrusion.  Bilateral L3-4 neural foraminal small oval low signal lesions on  T1 weighting which are higher signal T2-weighting consistent with  small perineural cysts with the larger one measuring 5 mm in  greatest diameter. Nerve roots appear to exit this disc space  level without compromise.  4.  L2-L3: Very mild diffuse disc protrusion minimally indenting  the anterior thecal sac. Nerve roots exit without compromise.  5.  L5-S1: Small perineural cysts involving bilateral S1 nerve  roots and the right L5 nerve root. Disc space normal. No disc  protrusion or extrusion. Nerve roots exit without compromise.     Electronically signed by:  Ramirez Ulloa MD  10/2/2018 8:24 AM CDT  Workstation: [id number]    EMG-NCS done on 12/6/2018 @ [facility] per Dr. Cedeno    Electromyography (EMG): EMG of bilateral tibialis anterior, medial gastrocnemius, vastus lateralis and paraspinal muscles from L2-S1 were done and all of them were found to be normal at rest.  During volitional activity, there were normal motor units and normal recruitment pattern, proportional to the effort.    Assessment: EMG-NCS test did not show any obvious neurophysiologic evidence of bilateral lower extremity lumbosacral radiculopathy at the ventral roots level from L2-S1 or bilateral lower extremity peripheral sensory motor neuropathy.  Patient still  could have lumbosacral radiculopathy clinically and this test can sometimes show false negative.      ASSESSMENT:    Diagnoses and all orders for this visit:    Osteopenia, unspecified location    Pain in both thighs  -     MRI Femur Right Without Contrast; Future  -     MRI Femur Left Without Contrast; Future    Other orders  -     meloxicam (MOBIC) 15 MG tablet; Take 1 tablet by mouth Daily for 30 days.    PLAN    Bone density study results from 10/2/2018 are reviewed today and discussed with patient.  We discussed her T score is, which correlate with diagnosis of osteopenia.  However, the patient has a history of a distal radius fracture in 2014 due to a fall at the age of 63.  In the presence of osteopenia, this would give a diagnosis of osteoporosis.  We discussed her risk for fracture due to this.  We discussed her individualize risk factors for osteoporosis including her postmenopausal state, age and gender.  We discussed various pharmaceutical treatment options including oral biphosphonates, Prolia and Forteo.  Patient declines starting any treatment for osteopenia/osteoporosis today and states she wants to continue to monitor her bone density.  She indicates she may be interested in starting treatment if her bone density results continue to worsen and scores fall into the osteoporosis category.  We discussed the importance of proper nutrition and adequate dietary intake of calcium and vitamin D for optimal bone health and prevention of worsening osteoporosis.  We discussed the importance of consistent weightbearing exercise, such as walking, for optimal bone health and prevention of worsening osteoporosis.  The patient is very active and exercises.  She swims frequently.  Patient is a nonsmoker that has never smoked so smoking cessation is not addressed or applicable.  Recommend a repeat bone density study in 1-2 years and follow-up with [facility].    Patient also complains today of chronic and  "ongoing bilateral upper leg pain that has continued to progressively worsen over the course of the past 10 years at least.  Patient has had extensive workups to evaluate for radicular pain.  She has had prior MRI of her lumbar spine, EMG-NCS study recently and has had two previous lumbar epidural injections of steroid in November 2018 per Dr. Garcia.  Patient reports the lumbar epidural injections did not improve her pain at all.  Patient is currently doing physical therapy as well.  Patient primarily complains of chronic pain in her upper legs, both anteriorly and posteriorly.  She has pain and tenderness of the hamstrings bilaterally.  She has increased pain with activity, even when she is swimming.  Patient states she can \"feel it pulling\" in her posterior upper legs when she is swimming, which is suspicious for muscle/tendon pathology.  Recommend MRIs of bilateral femurs to evaluate for tendinitis, tendon/muscle injuries or abnormalities, bone lesions and/or rhabdomyosarcoma.  We discussed a trial of prescription oral NSAIDs for consistent dosing and improved pain control.  Meloxicam is prescribed today for once daily dosing.  Patient is instructed not to take additional oral NSAIDs, such as Ibuprofen or Aleve, while taking Meloxicam.  Patient verbalized understanding.  Continue with activity as tolerated.  Continue with current course of physical therapy.  Follow-up after MRIs regarding chronic upper leg pain.    Return for follow up after MRIs.        This document has been electronically signed by ELVIS Piedra on January 15, 2019 4:18 PM      ELVIS Piedra  "

## 2019-01-15 PROBLEM — M79.651 PAIN IN BOTH THIGHS: Status: ACTIVE | Noted: 2019-01-15

## 2019-01-15 PROBLEM — M79.652 PAIN IN BOTH THIGHS: Status: ACTIVE | Noted: 2019-01-15

## 2019-01-15 PROBLEM — M85.80 OSTEOPENIA: Status: ACTIVE | Noted: 2019-01-15

## 2019-01-23 DIAGNOSIS — M79.652 PAIN IN BOTH THIGHS: ICD-10-CM

## 2019-01-23 DIAGNOSIS — M79.651 PAIN IN BOTH THIGHS: ICD-10-CM

## 2019-01-24 ENCOUNTER — OFFICE VISIT (OUTPATIENT)
Dept: ORTHOPEDIC SURGERY | Facility: CLINIC | Age: 69
End: 2019-01-24

## 2019-01-24 VITALS — BODY MASS INDEX: 22.26 KG/M2 | WEIGHT: 138.5 LBS | HEIGHT: 66 IN

## 2019-01-24 DIAGNOSIS — M79.651 PAIN IN BOTH THIGHS: Primary | ICD-10-CM

## 2019-01-24 DIAGNOSIS — M51.36 DEGENERATIVE DISC DISEASE, LUMBAR: Chronic | ICD-10-CM

## 2019-01-24 DIAGNOSIS — M53.3 CHRONIC RIGHT SI JOINT PAIN: ICD-10-CM

## 2019-01-24 DIAGNOSIS — M79.652 PAIN IN BOTH THIGHS: Primary | ICD-10-CM

## 2019-01-24 DIAGNOSIS — G89.29 CHRONIC RIGHT SI JOINT PAIN: ICD-10-CM

## 2019-01-24 PROCEDURE — 99214 OFFICE O/P EST MOD 30 MIN: CPT | Performed by: NURSE PRACTITIONER

## 2019-01-24 NOTE — PROGRESS NOTES
"Angella Baez is a 68 y.o. female returns for     Chief Complaint   Patient presents with   • Left Femur - Follow-up   • Right Femur - Follow-up   • Results     MRI       HISTORY OF PRESENT ILLNESS: Patient presents to office for follow-up of chronic bilateral upper leg pain and MRI results of bilateral femurs.  Patient reports that her pain has improved some since last office visit since starting Meloxicam daily. Patient states she has been able to reduce her dose of Tramadol since starting Meloxicam. Patient has also been able to start back swimming some, which typically increases her pain so she had stopped that activity. Patient continues with her current course of physical therapy. Patient continues to have pain in her bilateral upper legs, primarily posteriorly and worse in the right leg. She complains of chronic right-sided low back/hip pain also. Physical therapy continues to work with her regarding the right SI joint with stretches and exercises. She has also had previous accupuncture for the right SI joint. Patient continues to have pain the her bilateral posterior upper legs that typically occurs after activity and not during activity. Pain is described as burning in nature. Pain also increases after longer periods of sitting. Patient has an appointment with Dr. Garcia tomorrow.      CONCURRENT MEDICAL HISTORY:    The following portions of the patient's history were reviewed and updated as appropriate: allergies, current medications, past family history, past medical history, past social history, past surgical history and problem list.     ROS  No fevers or chills.  No chest pain or shortness of air.  No GI or  disturbances. Right-sided pelvic pain. Right leg pain. Left leg pain.     PHYSICAL EXAMINATION:       Ht 166.4 cm (65.5\")   Wt 62.8 kg (138 lb 8 oz)   BMI 22.70 kg/m²     Physical Exam   Constitutional: She is oriented to person, place, and time. Vital signs are normal. She appears " well-developed and well-nourished. She is active and cooperative. She does not appear ill. No distress.   HENT:   Head: Normocephalic.   Pulmonary/Chest: Effort normal. No respiratory distress.   Abdominal: Soft. She exhibits no distension.   Musculoskeletal: She exhibits tenderness (Right SI joint, Left SI joint (mild), Right hamstring, Left hamstring). She exhibits no edema or deformity.   Neurological: She is alert and oriented to person, place, and time. GCS eye subscore is 4. GCS verbal subscore is 5. GCS motor subscore is 6.   Skin: Skin is warm, dry and intact. Capillary refill takes less than 2 seconds. No erythema.   Psychiatric: She has a normal mood and affect. Her speech is normal and behavior is normal. Judgment and thought content normal. Cognition and memory are normal.   Vitals reviewed.      GAIT:     [x]  Normal  []  Antalgic    Assistive device: [x]  None  []  Walker     []  Crutches  []  Cane     []  Wheelchair  []  Stretcher    Back Exam     Tenderness   The patient is experiencing tenderness in the sacroiliac.    Range of Motion   The patient has normal back ROM.    Muscle Strength   The patient has normal back strength.    Tests   Straight leg raise right: negative  Straight leg raise left: negative    Reflexes   Patellar: normal    Other   Sensation: normal  Gait: normal   Erythema: no back redness    Comments:  No pain or tenderness of the spine with range of motion. SI joint tenderness noted bilaterally, worse in the right SI joint. No focal neurological deficits noted currently.  Mild tenderness to palpation of the bilateral hamstring muscles, including the tendon insertion sites distally.  No swelling.  No ecchymosis.  No erythema.  No visible abnormalities noted.            MRI femur right without contrast 1/23/2019  [facility]  Result Impression      Negative.    [id number]   Result Narrative   Indication:  Chronic right hip and femur pain.    MRI, Right Femur without  Gadolinium:  The right femur has a normal marrow signal.  The muscle attachments on the femur are unremarkable and no muscle injury is seen.  The hamstring attachments are normal.  No fluid collection, inflammatory change, or   mass is seen.   Other Result Information   Adria, Rad Results In - 01/23/2019  9:14 AM CST  Indication:  Chronic right hip and femur pain.    MRI, Right Femur without Gadolinium:  The right femur has a normal marrow signal.  The muscle attachments on the femur are unremarkable and no muscle injury is seen.  The hamstring attachments are normal.  No fluid collection, inflammatory change, or   mass is seen.    IMPRESSION:      Negative.       MRI femur left without contrast 1/23/2019  [facility]  Result Impression      Negative.    [id number]   Result Narrative   Indication:  Chronic left hip and thigh pain.    MRI, Left Femur without Gadolinium:  The left femur has a normal marrow signal.  The muscle attachments on the left femur are unremarkable and no muscle injury, mass, or fluid collection is seen.  Hamstring attachment on the ischial tuberosity is normal.   Other Result Information   Adria, Rad Results In - 01/23/2019  9:14 AM CST  Indication:  Chronic left hip and thigh pain.    MRI, Left Femur without Gadolinium:  The left femur has a normal marrow signal.  The muscle attachments on the left femur are unremarkable and no muscle injury, mass, or fluid collection is seen.  Hamstring attachment on the ischial tuberosity is normal.    IMPRESSION:      Negative.     MRI lumbar spine without contrast     Reason for exam: Low back pain.     FINDINGS: Multisequence multiplanar MR imaging of the lumbar  spine was performed without contrast. Lumbar spine vertebral body  heights and alignment are normal. There is no evidence of  fracture or dislocation identified. No abnormal marrow signal.  Conus of the spinal cord appears normal with tip at the T12-L1  intervertebral disc space.     T12-L1:  Disc space normal. No disc protrusion or extrusion.  Spinal cord and nerve roots exit without compromise.     L1-L2: Very mild diffuse disc protrusion minimally indenting the  anterior thecal sac. Bilateral neural foraminal circular low  signal lesions on T1 weighting which are higher signal on T2  weighting with these lesions measuring 6.3 mm in greatest  diameter. These lesions have imaging characteristics most  consistent with perineural cyst. Nerve roots appear to exit this  disc space level without compromise.     L2-L3: Very mild diffuse disc protrusion minimally indenting the  anterior thecal sac. Nerve roots exit without compromise.     L3-L4: Disc space normal. No disc protrusion or extrusion.  Bilateral L3-4 neural foraminal small oval low signal lesions on  T1 weighting which are higher signal T2-weighting consistent with  small perineural cysts with the larger one measuring 5 mm in  greatest diameter. Nerve roots appear to exit this disc space  level without compromise.     L4-L5: Disc space normal. No disc protrusion or extrusion. Nerve  roots exit without compromise.     L5-S1: Small perineural cysts involving bilateral S1 nerve roots  and the right L5 nerve root. Disc space normal. No disc  protrusion or extrusion. Nerve roots exit without compromise.     IMPRESSION:  1.  No acute MR lumbar spine abnormality.  2.  L1-L2: Very mild diffuse disc protrusion minimally indenting  the anterior thecal sac. Bilateral neural foraminal circular low  signal lesions on T1 weighting which are higher signal on T2  weighting with these lesions measuring 6.3 mm in greatest  diameter. These lesions have imaging characteristics most  consistent with perineural cyst. Nerve roots appear to exit this  disc space level without compromise.  3.  L3-L4: Disc space normal. No disc protrusion or extrusion.  Bilateral L3-4 neural foraminal small oval low signal lesions on  T1 weighting which are higher signal T2-weighting  consistent with  small perineural cysts with the larger one measuring 5 mm in  greatest diameter. Nerve roots appear to exit this disc space  level without compromise.  4.  L2-L3: Very mild diffuse disc protrusion minimally indenting  the anterior thecal sac. Nerve roots exit without compromise.  5.  L5-S1: Small perineural cysts involving bilateral S1 nerve  roots and the right L5 nerve root. Disc space normal. No disc  protrusion or extrusion. Nerve roots exit without compromise.     Electronically signed by:  Ramirez Ulloa MD  10/2/2018 8:24 AM CDT  Workstation: [id number]     EMG-NCS done on 12/6/2018 @ [facility] per Dr. Cedeno     Electromyography (EMG): EMG of bilateral tibialis anterior, medial gastrocnemius, vastus lateralis and paraspinal muscles from L2-S1 were done and all of them were found to be normal at rest.  During volitional activity, there were normal motor units and normal recruitment pattern, proportional to the effort.     Assessment: EMG-NCS test did not show any obvious neurophysiologic evidence of bilateral lower extremity lumbosacral radiculopathy at the ventral roots level from L2-S1 or bilateral lower extremity peripheral sensory motor neuropathy.  Patient still could have lumbosacral radiculopathy clinically and this test can sometimes show false negative.      ASSESSMENT:    Diagnoses and all orders for this visit:    Pain in both thighs    Chronic right SI joint pain    Degenerative disc disease, lumbar    PLAN    MRIs of bilateral femurs reviewed and results discussed with patient.  There are no abnormal findings noted on her MRIs femurs.  She does report that her pain has somewhat improved since starting meloxicam daily.  Patient continues to complain of chronic bilateral upper leg pain, primarily in the posterior aspect with pulling sensations and burning pain in the hamstrings.  She denies any muscle spasms.  Patient also complains of right-sided low back/hip  pain with tenderness in the right SI joint that is chronic and ongoing. She continues to work with physical therapy for her SI joint and leg pain.  She does frequent home exercises for her SI joint stretches.  She also reports that she has tried acupuncture for the SI joint.  As previously noted, the patient has had a prior MRI lumbar spine, which showed some mild degenerative disc disease but no surgical indication and no definitive nerve compression.  Patient has also had EMG-NCS, which did not show any nerve impingement.  Patient has had 2 previous lumbar epidural injections of steroid in November 2018 per Dr. Garcia, which did not improve her pain at all.  Based on all of the above, I do not feel that her leg pain is radicular in nature, a source of her degenerative disc disease or nerve compression from her lumbar spine.  We discussed trying a right-sided SI joint injection of steroid to improve her pain.  However, the patient has an appointment with Dr. Garcia tomorrow and we discussed that it would be much more efficient if she would discuss this with him and see if he agrees to this procedure.  If he does not agree, patient is instructed to let me know and I will order the SI joint injection through my office.  Continue Meloxicam daily, as previously prescribed.  Continue Tramadol as needed for worse pain.  Continue with activity as tolerated.  Continue with current course of physical therapy and home exercises. Continue with ice and/or heat therapy as needed for pain.  Follow-up as needed for any new, worsening or persistent symptoms.    Return if symptoms worsen or fail to improve, for Recheck.        This document has been electronically signed by ELVIS Piedra on January 24, 2019 11:40 AM      ELVIS Piedra

## 2019-03-05 ENCOUNTER — OFFICE VISIT (OUTPATIENT)
Dept: FAMILY MEDICINE CLINIC | Facility: CLINIC | Age: 69
End: 2019-03-05

## 2019-03-05 VITALS
HEIGHT: 66 IN | OXYGEN SATURATION: 99 % | WEIGHT: 136.2 LBS | HEART RATE: 97 BPM | SYSTOLIC BLOOD PRESSURE: 120 MMHG | DIASTOLIC BLOOD PRESSURE: 60 MMHG | BODY MASS INDEX: 21.89 KG/M2

## 2019-03-05 DIAGNOSIS — M79.7 PRIMARY FIBROMYALGIA SYNDROME: Chronic | ICD-10-CM

## 2019-03-05 DIAGNOSIS — G47.00 INSOMNIA, UNSPECIFIED TYPE: Chronic | ICD-10-CM

## 2019-03-05 DIAGNOSIS — I10 ESSENTIAL HYPERTENSION: Primary | Chronic | ICD-10-CM

## 2019-03-05 PROCEDURE — 99213 OFFICE O/P EST LOW 20 MIN: CPT | Performed by: GENERAL PRACTICE

## 2019-03-05 RX ORDER — LORAZEPAM 1 MG/1
1 TABLET ORAL NIGHTLY
Qty: 120 TABLET | Refills: 0 | Status: SHIPPED | OUTPATIENT
Start: 2019-03-05 | End: 2019-03-05 | Stop reason: SDUPTHER

## 2019-03-05 RX ORDER — TRAMADOL HYDROCHLORIDE 50 MG/1
50 TABLET ORAL 2 TIMES DAILY PRN
Qty: 180 TABLET | Refills: 0 | Status: SHIPPED | OUTPATIENT
Start: 2019-03-05 | End: 2019-03-05 | Stop reason: SDUPTHER

## 2019-03-05 RX ORDER — GABAPENTIN 300 MG/1
CAPSULE ORAL
Qty: 270 CAPSULE | Refills: 0 | Status: SHIPPED | OUTPATIENT
Start: 2019-03-05 | End: 2019-03-05 | Stop reason: SDUPTHER

## 2019-03-05 RX ORDER — TRAMADOL HYDROCHLORIDE 50 MG/1
50 TABLET ORAL 2 TIMES DAILY PRN
Qty: 180 TABLET | Refills: 0 | Status: SHIPPED | OUTPATIENT
Start: 2019-03-05 | End: 2019-06-07 | Stop reason: SDUPTHER

## 2019-03-05 RX ORDER — GABAPENTIN 300 MG/1
CAPSULE ORAL
Qty: 270 CAPSULE | Refills: 0 | Status: SHIPPED | OUTPATIENT
Start: 2019-03-05 | End: 2019-09-10 | Stop reason: SDUPTHER

## 2019-03-05 RX ORDER — LORAZEPAM 1 MG/1
1 TABLET ORAL NIGHTLY
Qty: 120 TABLET | Refills: 0 | Status: SHIPPED | OUTPATIENT
Start: 2019-03-05 | End: 2019-06-07 | Stop reason: SDUPTHER

## 2019-03-05 NOTE — PROGRESS NOTES
Subjective   Angella Baez is a 68 y.o. female.   Chief Complaint   Patient presents with   • Med Refill   • Hip Pain     For review and evaluation of management of chronic medical problems. Goes to Dr. Garcia for pain, injections have not helped. Is taking tramadol twice a day. Blood pressure is well controlled.   Hypertension   This is a chronic problem. The current episode started more than 1 year ago. The problem is unchanged. The problem is controlled. Pertinent negatives include no chest pain, headaches, neck pain, palpitations or shortness of breath. There are no associated agents to hypertension. Current antihypertension treatment includes calcium channel blockers and diuretics. The current treatment provides moderate improvement. There are no compliance problems.    Fibromyalgia   This is a chronic problem. The current episode started more than 1 year ago. The problem occurs constantly. The problem has been unchanged. Associated symptoms include myalgias. Pertinent negatives include no abdominal pain, arthralgias, chest pain, chills, congestion, coughing, fatigue, fever, headaches, joint swelling, nausea, neck pain, numbness, rash, sore throat, vomiting or weakness. The symptoms are aggravated by stress. Treatments tried: gabapentin. The treatment provided moderate relief.   Insomnia   This is a chronic problem. The current episode started more than 1 year ago. The problem occurs constantly. The problem has been unchanged. Associated symptoms include myalgias. Pertinent negatives include no abdominal pain, arthralgias, chest pain, chills, congestion, coughing, fatigue, fever, headaches, joint swelling, nausea, neck pain, numbness, rash, sore throat, vomiting or weakness. The symptoms are aggravated by stress. Treatments tried: lorazepam. The treatment provided significant relief.   Back Pain   This is a chronic problem. The current episode started more than 1 year ago. The problem occurs  constantly. The problem is unchanged. The pain is present in the lumbar spine and gluteal. The pain radiates to the right foot and left foot. The pain is at a severity of 2/10. The pain is moderate. The pain is worse during the day. The symptoms are aggravated by bending and standing. Stiffness is present in the morning. Pertinent negatives include no abdominal pain, chest pain, dysuria, fever, headaches, numbness or weakness. She has tried analgesics (gabapentin, epidural injection) for the symptoms. The treatment provided moderate relief.      The following portions of the patient's history were reviewed and updated as appropriate: allergies, current medications, past social history and problem list.    Outpatient Medications Prior to Visit   Medication Sig Dispense Refill   • desoximetasone (TOPICORT) 0.25 % cream      • diltiaZEM (CARDIZEM) 60 MG tablet Take 1 tablet by mouth 2 (Two) Times a Day. 180 tablet 3   • docusate sodium (COLACE) 100 MG capsule Take 100 mg by mouth every night. Takes 4 cap nightly     • Magnesium 250 MG tablet Take 4 tablets by mouth.     • spironolactone (ALDACTONE) 25 MG tablet Take 2 tablets by mouth Daily. 180 tablet 3   • gabapentin (NEURONTIN) 300 MG capsule TAKE ONE CAPSULE BY MOUTH TWICE A DAY AND TAKE TWO CAPSULES EVERY NIGHT AT BEDTIME 270 capsule 0   • LORazepam (ATIVAN) 1 MG tablet Take 1 tablet by mouth Every Night. May repeat x 1 prn 120 tablet 0   • traMADol (ULTRAM) 50 MG tablet Take 1 tablet by mouth 2 (Two) Times a Day As Needed for Moderate Pain . 180 tablet 0     No facility-administered medications prior to visit.        Review of Systems   Constitutional: Negative for chills, fatigue and fever.   HENT: Negative for congestion and sore throat.    Respiratory: Negative for cough and shortness of breath.    Cardiovascular: Negative for chest pain and palpitations.   Gastrointestinal: Negative for abdominal pain, nausea and vomiting.   Genitourinary: Negative for  "dysuria.   Musculoskeletal: Positive for back pain and myalgias. Negative for arthralgias, joint swelling and neck pain.   Skin: Negative for rash.   Neurological: Negative for weakness, numbness and headaches.   Psychiatric/Behavioral: The patient has insomnia.        Objective   Visit Vitals  /60   Pulse 97   Ht 166.4 cm (65.5\")   Wt 61.8 kg (136 lb 3.2 oz)   SpO2 99%   BMI 22.32 kg/m²     Physical Exam   Constitutional: She is oriented to person, place, and time. She appears well-developed and well-nourished. No distress.   HENT:   Head: Normocephalic and atraumatic.   Nose: Nose normal.   Mouth/Throat: Oropharynx is clear and moist.   Eyes: Conjunctivae and EOM are normal. Pupils are equal, round, and reactive to light. Right eye exhibits no discharge. Left eye exhibits no discharge.   Neck: No thyromegaly present.   Cardiovascular: Normal rate, regular rhythm, normal heart sounds and intact distal pulses.   Pulmonary/Chest: Effort normal and breath sounds normal.   Musculoskeletal:        Lumbar back: She exhibits tenderness.   Multiple tender points over back and chest   Lymphadenopathy:     She has no cervical adenopathy.   Neurological: She is alert and oriented to person, place, and time.   Skin: Skin is warm and dry.   Psychiatric: She has a normal mood and affect.   Nursing note and vitals reviewed.      Assessment/Plan   Problem List Items Addressed This Visit        Cardiovascular and Mediastinum    Essential hypertension - Primary (Chronic)       Nervous and Auditory    Primary fibromyalgia syndrome (Chronic)    Relevant Medications    gabapentin (NEURONTIN) 300 MG capsule    traMADol (ULTRAM) 50 MG tablet       Other    Insomnia (Chronic)    Relevant Medications    LORazepam (ATIVAN) 1 MG tablet          Continue current treatment. [place] reviewed and appropriate. Not recommended to drive or operate heavy equipment while taking potentially sedating meds.  Patient understands the risks associated " with this controlled medication, including tolerance and addiction. They also agree to obtain this medication only from me, and not from a another provider, unless that provider is covering for me in my absence. They also agree to be compliant in dosing, and not self adjust the dose of medication.  A signed controlled substance agreement is on file, and they have received a controlled substance education sheet at this or a previous visit. They have also signed a consent for treatment with a controlled substance as per [facility] policy.      New Medications Ordered This Visit   Medications   • gabapentin (NEURONTIN) 300 MG capsule     Sig: TAKE ONE CAPSULE BY MOUTH TWICE A DAY AND TAKE TWO CAPSULES EVERY NIGHT AT BEDTIME     Dispense:  270 capsule     Refill:  0   • LORazepam (ATIVAN) 1 MG tablet     Sig: Take 1 tablet by mouth Every Night. May repeat x 1 prn     Dispense:  120 tablet     Refill:  0   • traMADol (ULTRAM) 50 MG tablet     Sig: Take 1 tablet by mouth 2 (Two) Times a Day As Needed for Moderate Pain .     Dispense:  180 tablet     Refill:  0     Return in about 3 months (around 6/5/2019) for Recheck.

## 2019-06-07 ENCOUNTER — APPOINTMENT (OUTPATIENT)
Dept: LAB | Facility: HOSPITAL | Age: 69
End: 2019-06-07

## 2019-06-07 ENCOUNTER — OFFICE VISIT (OUTPATIENT)
Dept: FAMILY MEDICINE CLINIC | Facility: CLINIC | Age: 69
End: 2019-06-07

## 2019-06-07 VITALS
BODY MASS INDEX: 21.31 KG/M2 | OXYGEN SATURATION: 99 % | HEIGHT: 66 IN | WEIGHT: 132.6 LBS | DIASTOLIC BLOOD PRESSURE: 74 MMHG | SYSTOLIC BLOOD PRESSURE: 122 MMHG | HEART RATE: 83 BPM

## 2019-06-07 DIAGNOSIS — I10 ESSENTIAL HYPERTENSION: Primary | Chronic | ICD-10-CM

## 2019-06-07 DIAGNOSIS — G47.00 INSOMNIA, UNSPECIFIED TYPE: Chronic | ICD-10-CM

## 2019-06-07 DIAGNOSIS — M79.7 PRIMARY FIBROMYALGIA SYNDROME: Chronic | ICD-10-CM

## 2019-06-07 PROCEDURE — 36415 COLL VENOUS BLD VENIPUNCTURE: CPT | Performed by: GENERAL PRACTICE

## 2019-06-07 PROCEDURE — 99214 OFFICE O/P EST MOD 30 MIN: CPT | Performed by: GENERAL PRACTICE

## 2019-06-07 PROCEDURE — 80048 BASIC METABOLIC PNL TOTAL CA: CPT | Performed by: GENERAL PRACTICE

## 2019-06-07 RX ORDER — DILTIAZEM HYDROCHLORIDE 60 MG/1
30 TABLET, FILM COATED ORAL
COMMUNITY
End: 2019-09-10

## 2019-06-07 RX ORDER — SPIRONOLACTONE 25 MG/1
37.5 TABLET ORAL DAILY
COMMUNITY
End: 2019-09-10 | Stop reason: SDUPTHER

## 2019-06-07 RX ORDER — TRAMADOL HYDROCHLORIDE 50 MG/1
50 TABLET ORAL 2 TIMES DAILY PRN
Qty: 180 TABLET | Refills: 0 | Status: SHIPPED | OUTPATIENT
Start: 2019-06-07 | End: 2019-09-10 | Stop reason: SDUPTHER

## 2019-06-07 RX ORDER — LORAZEPAM 1 MG/1
1 TABLET ORAL NIGHTLY
Qty: 120 TABLET | Refills: 0 | Status: SHIPPED | OUTPATIENT
Start: 2019-06-07 | End: 2019-09-10 | Stop reason: SDUPTHER

## 2019-06-08 LAB
ANION GAP SERPL CALCULATED.3IONS-SCNC: 11.9 MMOL/L
BUN BLD-MCNC: 12 MG/DL (ref 8–23)
BUN/CREAT SERPL: 16.4 (ref 7–25)
CALCIUM SPEC-SCNC: 9.3 MG/DL (ref 8.6–10.5)
CHLORIDE SERPL-SCNC: 95 MMOL/L (ref 98–107)
CO2 SERPL-SCNC: 28.1 MMOL/L (ref 22–29)
CREAT BLD-MCNC: 0.73 MG/DL (ref 0.57–1)
GFR SERPL CREATININE-BSD FRML MDRD: 80 ML/MIN/1.73
GLUCOSE BLD-MCNC: 95 MG/DL (ref 65–99)
POTASSIUM BLD-SCNC: 4.5 MMOL/L (ref 3.5–5.2)
SODIUM BLD-SCNC: 135 MMOL/L (ref 136–145)

## 2019-06-12 ENCOUNTER — TELEPHONE (OUTPATIENT)
Dept: FAMILY MEDICINE CLINIC | Facility: CLINIC | Age: 69
End: 2019-06-12

## 2019-06-12 NOTE — PROGRESS NOTES
Per Dr. Thomson, Ms. Baez has been called with recent lab results & recommendations.  Continue current medications and follow-up as planned or sooner if any problems.

## 2019-06-12 NOTE — TELEPHONE ENCOUNTER
Per Dr. Thomson, Ms. Baez has been called with recent lab results & recommendations.  Continue current medications and follow-up as planned or sooner if any problems.      ----- Message from Anay Thomson MD sent at 6/12/2019  2:21 PM CDT -----  Call and tell labs were okay, sodium was a little low although better than before, her potassium is good and she does not need to be on any potassium supplement.

## 2019-09-09 DIAGNOSIS — I10 ESSENTIAL HYPERTENSION: Chronic | ICD-10-CM

## 2019-09-09 RX ORDER — SPIRONOLACTONE 25 MG/1
TABLET ORAL
Qty: 180 TABLET | Refills: 2 | Status: SHIPPED | OUTPATIENT
Start: 2019-09-09 | End: 2019-11-12

## 2019-09-10 ENCOUNTER — OFFICE VISIT (OUTPATIENT)
Dept: FAMILY MEDICINE CLINIC | Facility: CLINIC | Age: 69
End: 2019-09-10

## 2019-09-10 VITALS
WEIGHT: 135.2 LBS | SYSTOLIC BLOOD PRESSURE: 130 MMHG | HEIGHT: 66 IN | DIASTOLIC BLOOD PRESSURE: 90 MMHG | OXYGEN SATURATION: 98 % | BODY MASS INDEX: 21.73 KG/M2 | HEART RATE: 87 BPM

## 2019-09-10 DIAGNOSIS — M79.7 PRIMARY FIBROMYALGIA SYNDROME: Chronic | ICD-10-CM

## 2019-09-10 DIAGNOSIS — G47.00 INSOMNIA, UNSPECIFIED TYPE: Chronic | ICD-10-CM

## 2019-09-10 DIAGNOSIS — M51.36 DEGENERATIVE DISC DISEASE, LUMBAR: Primary | Chronic | ICD-10-CM

## 2019-09-10 DIAGNOSIS — I10 ESSENTIAL HYPERTENSION: Chronic | ICD-10-CM

## 2019-09-10 PROCEDURE — 99214 OFFICE O/P EST MOD 30 MIN: CPT | Performed by: GENERAL PRACTICE

## 2019-09-10 RX ORDER — GABAPENTIN 300 MG/1
CAPSULE ORAL
Qty: 270 CAPSULE | Refills: 0 | Status: SHIPPED | OUTPATIENT
Start: 2019-09-10 | End: 2019-12-12 | Stop reason: SDUPTHER

## 2019-09-10 RX ORDER — LORAZEPAM 1 MG/1
1 TABLET ORAL NIGHTLY
Qty: 120 TABLET | Refills: 0 | Status: SHIPPED | OUTPATIENT
Start: 2019-09-10 | End: 2019-11-12 | Stop reason: SDUPTHER

## 2019-09-10 RX ORDER — TRAMADOL HYDROCHLORIDE 50 MG/1
50 TABLET ORAL 2 TIMES DAILY PRN
Qty: 180 TABLET | Refills: 0 | Status: SHIPPED | OUTPATIENT
Start: 2019-09-10 | End: 2019-12-12 | Stop reason: SDUPTHER

## 2019-09-10 NOTE — PROGRESS NOTES
Subjective   Angella Baez is a 69 y.o. female.   Chief Complaint   Patient presents with   • Hypertension   • Fibromyalgia     For review and evaluation of management of chronic medical problems. Is having pain in right hip, seeing Dr. Garcia.   Hypertension   This is a chronic problem. The current episode started more than 1 year ago. The problem is unchanged. The problem is controlled. Pertinent negatives include no chest pain, headaches, neck pain, palpitations or shortness of breath. There are no associated agents to hypertension. Current antihypertension treatment includes calcium channel blockers and diuretics. The current treatment provides moderate improvement. There are no compliance problems.    Fibromyalgia   This is a chronic problem. The current episode started more than 1 year ago. The problem occurs constantly. The problem has been unchanged. Associated symptoms include myalgias. Pertinent negatives include no abdominal pain, arthralgias, chest pain, chills, congestion, coughing, fatigue, fever, headaches, joint swelling, nausea, neck pain, numbness, rash, sore throat, vomiting or weakness. The symptoms are aggravated by stress. Treatments tried: gabapentin. The treatment provided moderate relief.   Insomnia   This is a chronic problem. The current episode started more than 1 year ago. The problem occurs constantly. The problem has been unchanged. Associated symptoms include myalgias. Pertinent negatives include no abdominal pain, arthralgias, chest pain, chills, congestion, coughing, fatigue, fever, headaches, joint swelling, nausea, neck pain, numbness, rash, sore throat, vomiting or weakness. The symptoms are aggravated by stress. Treatments tried: lorazepam. The treatment provided significant relief.   Back Pain   This is a chronic problem. The current episode started more than 1 year ago. The problem occurs constantly. The problem is unchanged. The pain is present in the lumbar spine  and gluteal. The pain radiates to the right foot and left foot. The pain is at a severity of 4/10. The pain is moderate. The pain is worse during the day. The symptoms are aggravated by bending and standing. Stiffness is present in the morning. Pertinent negatives include no abdominal pain, chest pain, dysuria, fever, headaches, numbness or weakness. She has tried analgesics (gabapentin, epidural injection) for the symptoms. The treatment provided moderate relief.      The following portions of the patient's history were reviewed and updated as appropriate: allergies, current medications, past social history and problem list.    Outpatient Medications Prior to Visit   Medication Sig Dispense Refill   • desoximetasone (TOPICORT) 0.25 % cream      • docusate sodium (COLACE) 100 MG capsule Take 100 mg by mouth every night. Takes 4 cap nightly     • Magnesium 250 MG tablet Take 4 tablets by mouth.     • spironolactone (ALDACTONE) 25 MG tablet TAKE TWO TABLETS BY MOUTH DAILY 180 tablet 2   • gabapentin (NEURONTIN) 300 MG capsule TAKE ONE CAPSULE BY MOUTH TWICE A DAY AND TAKE TWO CAPSULES EVERY NIGHT AT BEDTIME 270 capsule 0   • LORazepam (ATIVAN) 1 MG tablet Take 1 tablet by mouth Every Night. May repeat x 1 prn 120 tablet 0   • traMADol (ULTRAM) 50 MG tablet Take 1 tablet by mouth 2 (Two) Times a Day As Needed for Moderate Pain . 180 tablet 0   • diltiaZEM (CARDIZEM) 60 MG tablet Take 30 mg by mouth Once.     • spironolactone (ALDACTONE) 25 MG tablet Take 37.5 mg by mouth Daily.       No facility-administered medications prior to visit.        Review of Systems   Constitutional: Negative.  Negative for chills, fatigue, fever and unexpected weight change.   HENT: Negative.  Negative for congestion, ear pain, hearing loss, nosebleeds, rhinorrhea, sneezing, sore throat and tinnitus.    Eyes: Negative.  Negative for discharge.   Respiratory: Negative.  Negative for cough, shortness of breath and wheezing.    Cardiovascular:  "Negative.  Negative for chest pain and palpitations.   Gastrointestinal: Negative.  Negative for abdominal pain, constipation, diarrhea, nausea and vomiting.   Endocrine: Negative.    Genitourinary: Negative.  Negative for dysuria, frequency and urgency.   Musculoskeletal: Positive for back pain and myalgias. Negative for arthralgias, joint swelling and neck pain.   Skin: Negative.  Negative for rash.   Allergic/Immunologic: Negative.    Neurological: Negative.  Negative for dizziness, weakness, numbness and headaches.   Hematological: Negative.  Negative for adenopathy.   Psychiatric/Behavioral: Negative for dysphoric mood and sleep disturbance. The patient has insomnia. The patient is not nervous/anxious.        Objective   Visit Vitals  /90 (BP Location: Left arm)   Pulse 87   Ht 166.4 cm (65.5\")   Wt 61.3 kg (135 lb 3.2 oz)   SpO2 98%   BMI 22.16 kg/m²     Physical Exam   Constitutional: She is oriented to person, place, and time. She appears well-developed and well-nourished. No distress.   HENT:   Head: Normocephalic and atraumatic.   Nose: Nose normal.   Mouth/Throat: Oropharynx is clear and moist.   Eyes: Conjunctivae and EOM are normal. Pupils are equal, round, and reactive to light. Right eye exhibits no discharge. Left eye exhibits no discharge.   Neck: No thyromegaly present.   Cardiovascular: Normal rate, regular rhythm, normal heart sounds and intact distal pulses.   Pulmonary/Chest: Effort normal and breath sounds normal.   Lymphadenopathy:     She has no cervical adenopathy.   Neurological: She is alert and oriented to person, place, and time.   Skin: Skin is warm and dry.   Psychiatric: She has a normal mood and affect.   Nursing note and vitals reviewed.      Assessment/Plan   Problem List Items Addressed This Visit        Cardiovascular and Mediastinum    Essential hypertension (Chronic)    Relevant Orders    Comprehensive Metabolic Panel    Lipid Panel    Urinalysis With Microscopic - " Urine, Clean Catch       Nervous and Auditory    Primary fibromyalgia syndrome (Chronic)    Relevant Medications    traMADol (ULTRAM) 50 MG tablet    gabapentin (NEURONTIN) 300 MG capsule    Other Relevant Orders    ToxASSURE Select 13 (MW) - Urine, Clean Catch       Musculoskeletal and Integument    Degenerative disc disease, lumbar - Primary (Chronic)    Relevant Medications    traMADol (ULTRAM) 50 MG tablet    gabapentin (NEURONTIN) 300 MG capsule    Other Relevant Orders    ToxASSURE Select 13 (MW) - Urine, Clean Catch       Other    Insomnia (Chronic)    Relevant Medications    LORazepam (ATIVAN) 1 MG tablet    Other Relevant Orders    ToxASSURE Select 13 (MW) - Urine, Clean Catch          Continue current treatment. [place] reviewed and appropriate. Not recommended to drive or operate heavy equipment while taking potentially sedating meds.  Patient understands the risks associated with this controlled medication, including tolerance and addiction. They also agree to obtain this medication only from me, and not from a another provider, unless that provider is covering for me in my absence. They also agree to be compliant in dosing, and not self adjust the dose of medication.  A signed controlled substance agreement is on file, and they have received a controlled substance education sheet at this or a previous visit. They have also signed a consent for treatment with a controlled substance as per [facility] policy.      New Medications Ordered This Visit   Medications   • traMADol (ULTRAM) 50 MG tablet     Sig: Take 1 tablet by mouth 2 (Two) Times a Day As Needed for Moderate Pain .     Dispense:  180 tablet     Refill:  0   • LORazepam (ATIVAN) 1 MG tablet     Sig: Take 1 tablet by mouth Every Night. May repeat x 1 prn     Dispense:  120 tablet     Refill:  0   • gabapentin (NEURONTIN) 300 MG capsule     Sig: TAKE ONE CAPSULE BY MOUTH A DAY AND TAKE TWO CAPSULES EVERY NIGHT AT BEDTIME     Dispense:  270  capsule     Refill:  0     Return in about 3 months (around 12/10/2019) for Annual physical, medicare wellness visit.

## 2019-09-11 DIAGNOSIS — Z12.31 ENCOUNTER FOR SCREENING MAMMOGRAM FOR MALIGNANT NEOPLASM OF BREAST: Primary | ICD-10-CM

## 2019-11-12 ENCOUNTER — OFFICE VISIT (OUTPATIENT)
Dept: FAMILY MEDICINE CLINIC | Facility: CLINIC | Age: 69
End: 2019-11-12

## 2019-11-12 VITALS
DIASTOLIC BLOOD PRESSURE: 85 MMHG | SYSTOLIC BLOOD PRESSURE: 132 MMHG | WEIGHT: 133 LBS | HEIGHT: 66 IN | BODY MASS INDEX: 21.38 KG/M2 | OXYGEN SATURATION: 99 % | HEART RATE: 78 BPM

## 2019-11-12 DIAGNOSIS — I10 ESSENTIAL HYPERTENSION: Chronic | ICD-10-CM

## 2019-11-12 DIAGNOSIS — G47.00 INSOMNIA, UNSPECIFIED TYPE: Chronic | ICD-10-CM

## 2019-11-12 DIAGNOSIS — M79.7 PRIMARY FIBROMYALGIA SYNDROME: Primary | Chronic | ICD-10-CM

## 2019-11-12 PROCEDURE — 99213 OFFICE O/P EST LOW 20 MIN: CPT | Performed by: GENERAL PRACTICE

## 2019-11-12 RX ORDER — SPIRONOLACTONE 25 MG/1
25 TABLET ORAL DAILY
Qty: 90 TABLET | Refills: 3
Start: 2019-11-12 | End: 2020-06-10

## 2019-11-12 RX ORDER — LORAZEPAM 1 MG/1
1 TABLET ORAL NIGHTLY
Qty: 120 TABLET | Refills: 0 | Status: SHIPPED | OUTPATIENT
Start: 2019-11-12 | End: 2020-02-03 | Stop reason: SDUPTHER

## 2019-11-12 NOTE — PROGRESS NOTES
Subjective   Angella Baez is a 69 y.o. female.   Chief Complaint   Patient presents with   • Insomnia   • Fibromyalgia     For review and evaluation of management of chronic medical problems. Had ablation of lumbar nerve and helped her back pain a lot.   Hypertension   This is a chronic problem. The current episode started more than 1 year ago. The problem is unchanged. The problem is controlled. Pertinent negatives include no chest pain, headaches, neck pain, palpitations or shortness of breath. There are no associated agents to hypertension. Current antihypertension treatment includes calcium channel blockers and diuretics. The current treatment provides moderate improvement. There are no compliance problems.    Fibromyalgia   This is a chronic problem. The current episode started more than 1 year ago. The problem occurs constantly. The problem has been unchanged. Associated symptoms include myalgias. Pertinent negatives include no abdominal pain, arthralgias, chest pain, chills, congestion, coughing, fatigue, fever, headaches, joint swelling, nausea, neck pain, numbness, rash, sore throat, vomiting or weakness. The symptoms are aggravated by stress. Treatments tried: gabapentin. The treatment provided moderate relief.   Insomnia   This is a chronic problem. The current episode started more than 1 year ago. The problem occurs constantly. The problem has been unchanged. Associated symptoms include myalgias. Pertinent negatives include no abdominal pain, arthralgias, chest pain, chills, congestion, coughing, fatigue, fever, headaches, joint swelling, nausea, neck pain, numbness, rash, sore throat, vomiting or weakness. The symptoms are aggravated by stress. Treatments tried: lorazepam. The treatment provided significant relief.   Back Pain   This is a chronic problem. The current episode started more than 1 year ago. The problem occurs constantly. The problem is unchanged. The pain is present in the lumbar  spine and gluteal. The pain radiates to the right foot and left foot. The pain is at a severity of 4/10. The pain is moderate. The pain is worse during the day. The symptoms are aggravated by bending and standing. Stiffness is present in the morning. Pertinent negatives include no abdominal pain, chest pain, dysuria, fever, headaches, numbness or weakness. She has tried analgesics (gabapentin, epidural injection) for the symptoms. The treatment provided moderate relief.      The following portions of the patient's history were reviewed and updated as appropriate: allergies, current medications, past social history and problem list.    Outpatient Medications Prior to Visit   Medication Sig Dispense Refill   • desoximetasone (TOPICORT) 0.25 % cream      • docusate sodium (COLACE) 100 MG capsule Take 100 mg by mouth every night. Takes 4 cap nightly     • gabapentin (NEURONTIN) 300 MG capsule TAKE ONE CAPSULE BY MOUTH A DAY AND TAKE TWO CAPSULES EVERY NIGHT AT BEDTIME 270 capsule 0   • Magnesium 250 MG tablet Take 4 tablets by mouth.     • traMADol (ULTRAM) 50 MG tablet Take 1 tablet by mouth 2 (Two) Times a Day As Needed for Moderate Pain . 180 tablet 0   • LORazepam (ATIVAN) 1 MG tablet Take 1 tablet by mouth Every Night. May repeat x 1 prn 120 tablet 0   • spironolactone (ALDACTONE) 25 MG tablet TAKE TWO TABLETS BY MOUTH DAILY 180 tablet 2     No facility-administered medications prior to visit.        Review of Systems   Constitutional: Negative.  Negative for chills, fatigue, fever and unexpected weight change.   HENT: Negative.  Negative for congestion, ear pain, hearing loss, nosebleeds, rhinorrhea, sneezing, sore throat and tinnitus.    Eyes: Negative.  Negative for discharge.   Respiratory: Negative.  Negative for cough, shortness of breath and wheezing.    Cardiovascular: Negative.  Negative for chest pain and palpitations.   Gastrointestinal: Negative.  Negative for abdominal pain, constipation, diarrhea,  "nausea and vomiting.   Endocrine: Negative.    Genitourinary: Negative.  Negative for dysuria, frequency and urgency.   Musculoskeletal: Positive for back pain and myalgias. Negative for arthralgias, joint swelling and neck pain.   Skin: Negative.  Negative for rash.   Allergic/Immunologic: Negative.    Neurological: Negative.  Negative for dizziness, weakness, numbness and headaches.   Hematological: Negative.  Negative for adenopathy.   Psychiatric/Behavioral: Negative for dysphoric mood and sleep disturbance. The patient has insomnia. The patient is not nervous/anxious.        Objective   Visit Vitals  /85 (BP Location: Right arm)   Pulse 78   Ht 166.4 cm (65.5\")   Wt 60.3 kg (133 lb)   SpO2 99%   BMI 21.80 kg/m²     Physical Exam   Constitutional: She is oriented to person, place, and time. She appears well-developed and well-nourished. No distress.   HENT:   Head: Normocephalic and atraumatic.   Nose: Nose normal.   Mouth/Throat: Oropharynx is clear and moist.   Eyes: Conjunctivae and EOM are normal. Pupils are equal, round, and reactive to light. Right eye exhibits no discharge. Left eye exhibits no discharge.   Neck: No thyromegaly present.   Cardiovascular: Normal rate, regular rhythm, normal heart sounds and intact distal pulses.   Pulmonary/Chest: Effort normal and breath sounds normal.   Lymphadenopathy:     She has no cervical adenopathy.   Neurological: She is alert and oriented to person, place, and time.   Skin: Skin is warm and dry.   Psychiatric: She has a normal mood and affect.   Nursing note and vitals reviewed.      Assessment/Plan   Problem List Items Addressed This Visit        Cardiovascular and Mediastinum    Essential hypertension (Chronic)    Relevant Medications    spironolactone (ALDACTONE) 25 MG tablet       Other    Insomnia (Chronic)    Relevant Medications    LORazepam (ATIVAN) 1 MG tablet          Continue current treatment. [place] reviewed and appropriate. Not recommended to " drive or operate heavy equipment while taking potentially sedating meds.  Patient understands the risks associated with this controlled medication, including tolerance and addiction. They also agree to obtain this medication only from me, and not from a another provider, unless that provider is covering for me in my absence. They also agree to be compliant in dosing, and not self adjust the dose of medication.  A signed controlled substance agreement is on file, and they have received a controlled substance education sheet at this or a previous visit. They have also signed a consent for treatment with a controlled substance as per [facility] policy.      New Medications Ordered This Visit   Medications   • LORazepam (ATIVAN) 1 MG tablet     Sig: Take 1 tablet by mouth Every Night. May repeat x 1 prn     Dispense:  120 tablet     Refill:  0   • spironolactone (ALDACTONE) 25 MG tablet     Sig: Take 1 tablet by mouth Daily.     Dispense:  90 tablet     Refill:  3     Return if symptoms worsen or fail to improve, for Next scheduled follow up.

## 2019-12-03 ENCOUNTER — LAB (OUTPATIENT)
Dept: LAB | Facility: HOSPITAL | Age: 69
End: 2019-12-03

## 2019-12-03 DIAGNOSIS — M51.36 DEGENERATIVE DISC DISEASE, LUMBAR: Chronic | ICD-10-CM

## 2019-12-03 DIAGNOSIS — G47.00 INSOMNIA, UNSPECIFIED TYPE: Chronic | ICD-10-CM

## 2019-12-03 DIAGNOSIS — I10 ESSENTIAL HYPERTENSION: ICD-10-CM

## 2019-12-03 DIAGNOSIS — M79.7 PRIMARY FIBROMYALGIA SYNDROME: Chronic | ICD-10-CM

## 2019-12-03 LAB
ALBUMIN SERPL-MCNC: 4.5 G/DL (ref 3.5–5.2)
ALBUMIN/GLOB SERPL: 1.5 G/DL
ALP SERPL-CCNC: 67 U/L (ref 39–117)
ALT SERPL W P-5'-P-CCNC: 15 U/L (ref 1–33)
ANION GAP SERPL CALCULATED.3IONS-SCNC: 14.4 MMOL/L (ref 5–15)
AST SERPL-CCNC: 19 U/L (ref 1–32)
BACTERIA UR QL AUTO: ABNORMAL /HPF
BILIRUB SERPL-MCNC: 0.4 MG/DL (ref 0.2–1.2)
BILIRUB UR QL STRIP: NEGATIVE
BUN BLD-MCNC: 17 MG/DL (ref 8–23)
BUN/CREAT SERPL: 21 (ref 7–25)
CALCIUM SPEC-SCNC: 9.4 MG/DL (ref 8.6–10.5)
CHLORIDE SERPL-SCNC: 100 MMOL/L (ref 98–107)
CHOLEST SERPL-MCNC: 220 MG/DL (ref 0–200)
CLARITY UR: ABNORMAL
CO2 SERPL-SCNC: 26.6 MMOL/L (ref 22–29)
COLOR UR: YELLOW
CREAT BLD-MCNC: 0.81 MG/DL (ref 0.57–1)
GFR SERPL CREATININE-BSD FRML MDRD: 71 ML/MIN/1.73
GLOBULIN UR ELPH-MCNC: 3 GM/DL
GLUCOSE BLD-MCNC: 89 MG/DL (ref 65–99)
GLUCOSE UR STRIP-MCNC: NEGATIVE MG/DL
HDLC SERPL-MCNC: 55 MG/DL (ref 40–60)
HGB UR QL STRIP.AUTO: NEGATIVE
HYALINE CASTS UR QL AUTO: ABNORMAL /LPF
KETONES UR QL STRIP: NEGATIVE
LDLC SERPL CALC-MCNC: 148 MG/DL (ref 0–100)
LDLC/HDLC SERPL: 2.7 {RATIO}
LEUKOCYTE ESTERASE UR QL STRIP.AUTO: NEGATIVE
NITRITE UR QL STRIP: NEGATIVE
PH UR STRIP.AUTO: 8 [PH] (ref 5–8)
POTASSIUM BLD-SCNC: 4.4 MMOL/L (ref 3.5–5.2)
PROT SERPL-MCNC: 7.5 G/DL (ref 6–8.5)
PROT UR QL STRIP: ABNORMAL
RBC # UR: ABNORMAL /HPF
REF LAB TEST METHOD: ABNORMAL
SODIUM BLD-SCNC: 141 MMOL/L (ref 136–145)
SP GR UR STRIP: 1.03 (ref 1–1.03)
SQUAMOUS #/AREA URNS HPF: ABNORMAL /HPF
TRIGL SERPL-MCNC: 83 MG/DL (ref 0–150)
UROBILINOGEN UR QL STRIP: ABNORMAL
VLDLC SERPL-MCNC: 16.6 MG/DL (ref 5–40)
WBC UR QL AUTO: ABNORMAL /HPF

## 2019-12-03 PROCEDURE — 80307 DRUG TEST PRSMV CHEM ANLYZR: CPT

## 2019-12-03 PROCEDURE — 81001 URINALYSIS AUTO W/SCOPE: CPT

## 2019-12-03 PROCEDURE — G0481 DRUG TEST DEF 8-14 CLASSES: HCPCS

## 2019-12-03 PROCEDURE — 36415 COLL VENOUS BLD VENIPUNCTURE: CPT

## 2019-12-03 PROCEDURE — 80053 COMPREHEN METABOLIC PANEL: CPT

## 2019-12-03 PROCEDURE — 80061 LIPID PANEL: CPT

## 2019-12-06 ENCOUNTER — TELEPHONE (OUTPATIENT)
Dept: FAMILY MEDICINE CLINIC | Facility: CLINIC | Age: 69
End: 2019-12-06

## 2019-12-07 LAB — CONV REPORT SUMMARY: NORMAL

## 2019-12-12 ENCOUNTER — OFFICE VISIT (OUTPATIENT)
Dept: FAMILY MEDICINE CLINIC | Facility: CLINIC | Age: 69
End: 2019-12-12

## 2019-12-12 VITALS
HEIGHT: 66 IN | BODY MASS INDEX: 21.21 KG/M2 | HEART RATE: 88 BPM | WEIGHT: 132 LBS | DIASTOLIC BLOOD PRESSURE: 88 MMHG | OXYGEN SATURATION: 97 % | SYSTOLIC BLOOD PRESSURE: 140 MMHG

## 2019-12-12 DIAGNOSIS — I10 ESSENTIAL HYPERTENSION: Chronic | ICD-10-CM

## 2019-12-12 DIAGNOSIS — Z00.00 MEDICARE ANNUAL WELLNESS VISIT, SUBSEQUENT: Primary | ICD-10-CM

## 2019-12-12 DIAGNOSIS — G47.00 INSOMNIA, UNSPECIFIED TYPE: Chronic | ICD-10-CM

## 2019-12-12 DIAGNOSIS — E78.2 MIXED HYPERLIPIDEMIA: ICD-10-CM

## 2019-12-12 DIAGNOSIS — M79.7 PRIMARY FIBROMYALGIA SYNDROME: Chronic | ICD-10-CM

## 2019-12-12 DIAGNOSIS — M51.36 DEGENERATIVE DISC DISEASE, LUMBAR: Chronic | ICD-10-CM

## 2019-12-12 PROCEDURE — 99214 OFFICE O/P EST MOD 30 MIN: CPT | Performed by: GENERAL PRACTICE

## 2019-12-12 PROCEDURE — G0439 PPPS, SUBSEQ VISIT: HCPCS | Performed by: GENERAL PRACTICE

## 2019-12-12 RX ORDER — GABAPENTIN 300 MG/1
CAPSULE ORAL
Qty: 270 CAPSULE | Refills: 0 | Status: SHIPPED | OUTPATIENT
Start: 2019-12-12 | End: 2020-03-12 | Stop reason: SDUPTHER

## 2019-12-12 RX ORDER — TRAMADOL HYDROCHLORIDE 50 MG/1
50 TABLET ORAL 2 TIMES DAILY PRN
Qty: 180 TABLET | Refills: 0 | Status: SHIPPED | OUTPATIENT
Start: 2019-12-12 | End: 2019-12-16 | Stop reason: SDUPTHER

## 2019-12-12 NOTE — PATIENT INSTRUCTIONS
Medicare Wellness  Personal Prevention Plan of Service     Date of Office Visit:  2019  Encounter Provider:  Anay Thomson MD  Place of Service:  [facility]  Patient Name: Angella Baez  :  1950    As part of the Medicare Wellness portion of your visit today, we are providing you with this personalized preventive plan of services (PPPS). This plan is based upon recommendations of the [place] Preventive Services Task Force (USPSTF) and the Advisory Committee on Immunization Practices (ACIP).    This lists the preventive care services that should be considered, and provides dates of when you are due. Items listed as completed are up-to-date and do not require any further intervention.    Health Maintenance   Topic Date Due   • Pneumococcal Vaccine Once at 65 Years Old  2017   • LIPID PANEL  2020   • DXA SCAN  2020   • MEDICARE ANNUAL WELLNESS  2020   • COLOGUARD  2021   • MAMMOGRAM  2021   • TDAP/TD VACCINES (2 - Td) 2027   • HEPATITIS C SCREENING  Completed   • INFLUENZA VACCINE  Completed   • ZOSTER VACCINE  Addressed       No orders of the defined types were placed in this encounter.      Return in about 3 months (around 3/12/2020) for Recheck.

## 2019-12-12 NOTE — PROGRESS NOTES
The ABCs of the Annual Wellness Visit  Subsequent Medicare Wellness Visit    Chief Complaint   Patient presents with   • Annual Exam     labs nae medicare wellness       Subjective   History of Present Illness:  Angella Baez is a 69 y.o. female who presents for a Subsequent Medicare Wellness Visit.    HEALTH RISK ASSESSMENT    Recent Hospitalizations:  No hospitalization(s) within the last year.    Current Medical Providers:  Patient Care Team:  Anay Thomson MD as PCP - General (Family Medicine)  Anay Thomson MD as PCP - Claims Attributed  Marty Purcell MD as Consulting Physician (Dermatology)  Henry Estrada DO as Consulting Physician (Gastroenterology)    Smoking Status:  Social History     Tobacco Use   Smoking Status Never Smoker   Smokeless Tobacco Never Used       Alcohol Consumption:  Social History     Substance and Sexual Activity   Alcohol Use No       Depression Screen:   PHQ-2/PHQ-9 Depression Screening 12/12/2019   Little interest or pleasure in doing things 0   Feeling down, depressed, or hopeless 0   Trouble falling or staying asleep, or sleeping too much 0   Feeling tired or having little energy 0   Poor appetite or overeating 0   Feeling bad about yourself - or that you are a failure or have let yourself or your family down 0   Trouble concentrating on things, such as reading the newspaper or watching television 0   Moving or speaking so slowly that other people could have noticed. Or the opposite - being so fidgety or restless that you have been moving around a lot more than usual 0   Thoughts that you would be better off dead, or of hurting yourself in some way 0   Total Score 0   If you checked off any problems, how difficult have these problems made it for you to do your work, take care of things at home, or get along with other people? -       Fall Risk Screen:  STEADI Fall Risk Assessment was completed, and patient is at LOW risk for falls.Assessment completed  on:12/12/2019    Health Habits and Functional and Cognitive Screening:  Functional & Cognitive Status 12/12/2019   Do you have difficulty preparing food and eating? No   Do you have difficulty bathing yourself, getting dressed or grooming yourself? No   Do you have difficulty using the toilet? No   Do you have difficulty moving around from place to place? No   Do you have trouble with steps or getting out of a bed or a chair? No   Current Diet Well Balanced Diet   Dental Exam Up to date   Eye Exam Not up to date   Exercise (times per week) 3 times per week   Current Exercise Activities Include Swimming   Do you need help using the phone?  No   Are you deaf or do you have serious difficulty hearing?  No   Do you need help with transportation? No   Do you need help shopping? No   Do you need help preparing meals?  No   Do you need help with housework?  No   Do you need help with laundry? No   Do you need help taking your medications? No   Do you need help managing money? No   Do you ever drive or ride in a car without wearing a seat belt? No   Have you felt unusual stress, anger or loneliness in the last month? No   Who do you live with? Spouse   If you need help, do you have trouble finding someone available to you? No   Have you been bothered in the last four weeks by sexual problems? No   Do you have difficulty concentrating, remembering or making decisions? No         Does the patient have evidence of cognitive impairment? No    Asprin use counseling:Does not need ASA (and currently is not on it)    Age-appropriate Screening Schedule:  Refer to the list below for future screening recommendations based on patient's age, sex and/or medical conditions. Orders for these recommended tests are listed in the plan section. The patient has been provided with a written plan.    Health Maintenance   Topic Date Due   • LIPID PANEL  12/03/2020   • DXA SCAN  12/03/2020   • MAMMOGRAM  12/18/2021   • TDAP/TD VACCINES (2 - Td)  11/05/2027   • INFLUENZA VACCINE  Completed   • ZOSTER VACCINE  Addressed          The following portions of the patient's history were reviewed and updated as appropriate: allergies, current medications, past family history, past medical history, past social history, past surgical history and problem list.    Outpatient Medications Prior to Visit   Medication Sig Dispense Refill   • desoximetasone (TOPICORT) 0.25 % cream      • docusate sodium (COLACE) 100 MG capsule Take 100 mg by mouth every night. Takes 4 cap nightly     • LORazepam (ATIVAN) 1 MG tablet Take 1 tablet by mouth Every Night. May repeat x 1 prn 120 tablet 0   • Magnesium 250 MG tablet Take 4 tablets by mouth.     • spironolactone (ALDACTONE) 25 MG tablet Take 1 tablet by mouth Daily. 90 tablet 3   • gabapentin (NEURONTIN) 300 MG capsule TAKE ONE CAPSULE BY MOUTH A DAY AND TAKE TWO CAPSULES EVERY NIGHT AT BEDTIME 270 capsule 0   • traMADol (ULTRAM) 50 MG tablet Take 1 tablet by mouth 2 (Two) Times a Day As Needed for Moderate Pain . 180 tablet 0     No facility-administered medications prior to visit.        Patient Active Problem List   Diagnosis   • Depressive disorder   • Essential hypertension   • Primary fibromyalgia syndrome   • Insomnia   • Acute midline low back pain with right-sided sciatica   • Degenerative disc disease, lumbar   • Osteopenia   • Pain in both thighs   • Chronic right SI joint pain       Advanced Care Planning:  Patient does not have an advance directive - information provided to the patient today    Review of Systems   Constitutional: Negative.  Negative for chills, fatigue, fever and unexpected weight change.   HENT: Negative.  Negative for congestion, ear pain, hearing loss, nosebleeds, rhinorrhea, sneezing, sore throat and tinnitus.    Eyes: Negative.  Negative for discharge.   Respiratory: Negative.  Negative for cough, shortness of breath and wheezing.    Cardiovascular: Negative.  Negative for chest pain and  "palpitations.   Gastrointestinal: Negative.  Negative for abdominal pain, constipation, diarrhea, nausea and vomiting.   Endocrine: Negative.    Genitourinary: Negative.  Negative for dysuria, frequency and urgency.   Musculoskeletal: Positive for back pain and myalgias. Negative for arthralgias, joint swelling and neck pain.   Skin: Negative.  Negative for rash.   Allergic/Immunologic: Negative.    Neurological: Negative.  Negative for dizziness, weakness, numbness and headaches.   Hematological: Negative.  Negative for adenopathy.   Psychiatric/Behavioral: Negative.  Negative for dysphoric mood and sleep disturbance. The patient is not nervous/anxious.        Compared to one year ago, the patient feels her physical health is worse.  Compared to one year ago, the patient feels her mental health is the same.    Reviewed chart for potential of high risk medication in the elderly: yes  Reviewed chart for potential of harmful drug interactions in the elderly:yes    Objective         Vitals:    12/12/19 1105   BP: 140/88   Pulse: 88   SpO2: 97%   Weight: 59.9 kg (132 lb)   Height: 166.4 cm (65.5\")   PainSc:   2   PainLoc: Leg  Comment: both legs       Body mass index is 21.63 kg/m².  Discussed the patient's BMI with her. The BMI is in the acceptable range.    Physical Exam   Constitutional: She is oriented to person, place, and time. She appears well-developed and well-nourished. No distress.   HENT:   Head: Normocephalic and atraumatic.   Nose: Nose normal.   Mouth/Throat: Oropharynx is clear and moist.   Eyes: Pupils are equal, round, and reactive to light. Conjunctivae and EOM are normal. Right eye exhibits no discharge. Left eye exhibits no discharge.   Neck: No thyromegaly present.   Cardiovascular: Normal rate, regular rhythm, normal heart sounds and intact distal pulses.   Pulmonary/Chest: Effort normal and breath sounds normal.   Lymphadenopathy:     She has no cervical adenopathy.   Neurological: She is alert " and oriented to person, place, and time.   Skin: Skin is warm and dry.   Psychiatric: She has a normal mood and affect.   Nursing note and vitals reviewed.      Lab Results   Component Value Date    TRIG 83 12/03/2019    HDL 55 12/03/2019     (H) 12/03/2019    VLDL 16.6 12/03/2019        Assessment/Plan   Medicare Risks and Personalized Health Plan  CMS Preventative Services Quick Reference  Advance Directive Discussion  Chronic Pain   Fall Risk  Immunizations Discussed/Encouraged (specific immunizations; Shingrix )    The above risks/problems have been discussed with the patient.  Pertinent information has been shared with the patient in the After Visit Summary.  Follow up plans and orders are seen below in the Assessment/Plan Section.    Diagnoses and all orders for this visit:    1. Medicare annual wellness visit, subsequent (Primary)    2. Insomnia, unspecified type    3. Primary fibromyalgia syndrome  -     gabapentin (NEURONTIN) 300 MG capsule; TAKE ONE CAPSULE BY MOUTH A DAY AND TAKE TWO CAPSULES EVERY NIGHT AT BEDTIME  Dispense: 270 capsule; Refill: 0  -     Discontinue: traMADol (ULTRAM) 50 MG tablet; Take 1 tablet by mouth 2 (Two) Times a Day As Needed for Moderate Pain .  Dispense: 180 tablet; Refill: 0    4. Degenerative disc disease, lumbar  -     gabapentin (NEURONTIN) 300 MG capsule; TAKE ONE CAPSULE BY MOUTH A DAY AND TAKE TWO CAPSULES EVERY NIGHT AT BEDTIME  Dispense: 270 capsule; Refill: 0  -     Discontinue: traMADol (ULTRAM) 50 MG tablet; Take 1 tablet by mouth 2 (Two) Times a Day As Needed for Moderate Pain .  Dispense: 180 tablet; Refill: 0    5. Essential hypertension    6. Mixed hyperlipidemia      Follow Up:  Return in about 3 months (around 3/12/2020) for Recheck.     An After Visit Summary and PPPS were given to the patient.    Information has been scanned into chart.  Discussed importance of taking medications as prescribed. Encouraged healthy eating habits with low fat, low salt  choices and working towards maintaining a healthy weight. Recommended regular exercise if able as well as care to prevent falls including avoiding anything on the floor that they could slip or trip on such as throw rugs, making sure they have a bathmat to step onto when their feet are wet and having grab bars and railings where needed.

## 2019-12-12 NOTE — PROGRESS NOTES
Subjective   Angella Baez is a 69 y.o. female.     Chief Complaint   Patient presents with   • Annual Exam     labs [facility] medicare wellness     For review and evaluation of management of chronic medical problems. Labs reviewed. Due for mammogram.  Is seeing Dr. Garcia in the pain clinic.  Hypertension   This is a chronic problem. The current episode started more than 1 year ago. The problem is unchanged. The problem is controlled. Pertinent negatives include no chest pain, headaches, neck pain, palpitations or shortness of breath. There are no associated agents to hypertension. Current antihypertension treatment includes calcium channel blockers and diuretics. The current treatment provides moderate improvement. There are no compliance problems.    Fibromyalgia   This is a chronic problem. The current episode started more than 1 year ago. The problem occurs constantly. The problem has been unchanged. Associated symptoms include arthralgias. Pertinent negatives include no abdominal pain, chest pain, chills, congestion, coughing, fatigue, fever, headaches, joint swelling, myalgias, nausea, neck pain, numbness, rash, sore throat, vomiting or weakness. The symptoms are aggravated by stress. Treatments tried: gabapentin. The treatment provided moderate relief.   Insomnia   This is a chronic problem. The current episode started more than 1 year ago. The problem occurs constantly. The problem has been unchanged. Associated symptoms include arthralgias. Pertinent negatives include no abdominal pain, chest pain, chills, congestion, coughing, fatigue, fever, headaches, joint swelling, myalgias, nausea, neck pain, numbness, rash, sore throat, vomiting or weakness. The symptoms are aggravated by stress. Treatments tried: lorazepam. The treatment provided significant relief.   Back Pain   This is a chronic problem. The current episode started more than 1 year ago. The problem occurs constantly. The problem is unchanged.  The pain is present in the lumbar spine and gluteal. The pain radiates to the right foot and left foot. The pain is at a severity of 2/10. The pain is moderate. The pain is worse during the day. The symptoms are aggravated by bending and standing. Stiffness is present in the morning. Pertinent negatives include no abdominal pain, chest pain, dysuria, fever, headaches, numbness or weakness. She has tried analgesics (gabapentin, epidural injection) for the symptoms. The treatment provided moderate relief.      The following portions of the patient's history were reviewed and updated as appropriate: allergies, current medications, past family and social history and problem list.    Outpatient Medications Prior to Visit   Medication Sig Dispense Refill   • desoximetasone (TOPICORT) 0.25 % cream      • docusate sodium (COLACE) 100 MG capsule Take 100 mg by mouth every night. Takes 4 cap nightly     • LORazepam (ATIVAN) 1 MG tablet Take 1 tablet by mouth Every Night. May repeat x 1 prn 120 tablet 0   • Magnesium 250 MG tablet Take 4 tablets by mouth.     • spironolactone (ALDACTONE) 25 MG tablet Take 1 tablet by mouth Daily. 90 tablet 3   • gabapentin (NEURONTIN) 300 MG capsule TAKE ONE CAPSULE BY MOUTH A DAY AND TAKE TWO CAPSULES EVERY NIGHT AT BEDTIME 270 capsule 0   • traMADol (ULTRAM) 50 MG tablet Take 1 tablet by mouth 2 (Two) Times a Day As Needed for Moderate Pain . 180 tablet 0     No facility-administered medications prior to visit.        Review of Systems   Constitutional: Negative.  Negative for chills, fatigue, fever and unexpected weight change.   HENT: Negative.  Negative for congestion, ear pain, hearing loss, nosebleeds, rhinorrhea, sneezing, sore throat and tinnitus.    Eyes: Negative.  Negative for discharge.   Respiratory: Negative.  Negative for cough, shortness of breath and wheezing.    Cardiovascular: Negative.  Negative for chest pain and palpitations.   Gastrointestinal: Negative.  Negative for  "abdominal pain, constipation, diarrhea, nausea and vomiting.   Endocrine: Negative.    Genitourinary: Negative.  Negative for dysuria, frequency and urgency.   Musculoskeletal: Positive for arthralgias and back pain. Negative for joint swelling, myalgias and neck pain.   Skin: Negative.  Negative for rash.   Allergic/Immunologic: Negative.    Neurological: Negative.  Negative for dizziness, weakness, numbness and headaches.   Hematological: Negative.  Negative for adenopathy.   Psychiatric/Behavioral: Negative for dysphoric mood and sleep disturbance. The patient has insomnia. The patient is not nervous/anxious.      Objective     Visit Vitals  /88   Pulse 88   Ht 166.4 cm (65.5\")   Wt 59.9 kg (132 lb)   SpO2 97%   BMI 21.63 kg/m²     Physical Exam   Constitutional: She is oriented to person, place, and time. She appears well-developed and well-nourished. No distress.   HENT:   Head: Normocephalic and atraumatic.   Nose: Nose normal.   Mouth/Throat: Oropharynx is clear and moist.   Eyes: Pupils are equal, round, and reactive to light. Conjunctivae and EOM are normal. Right eye exhibits no discharge. Left eye exhibits no discharge.   Neck: No tracheal deviation present. No thyromegaly present.   Cardiovascular: Normal rate, regular rhythm, normal heart sounds and intact distal pulses.   No murmur heard.  Pulmonary/Chest: Effort normal and breath sounds normal. No respiratory distress. She has no wheezes. She has no rales. She exhibits no tenderness. Right breast exhibits no inverted nipple, no mass, no nipple discharge, no skin change and no tenderness. Left breast exhibits no inverted nipple, no mass, no nipple discharge, no skin change and no tenderness.   Abdominal: Soft. Bowel sounds are normal. She exhibits no distension and no mass. There is no tenderness. No hernia.   Musculoskeletal: Normal range of motion. She exhibits no deformity.   Lymphadenopathy:     She has no cervical adenopathy.   Neurological: " She is alert and oriented to person, place, and time. She has normal reflexes.   Skin: Skin is warm and dry.   Psychiatric: She has a normal mood and affect. Her behavior is normal. Judgment and thought content normal.   Nursing note and vitals reviewed.    Results for orders placed or performed in visit on 12/03/19   Comprehensive Metabolic Panel   Result Value Ref Range    Glucose 89 65 - 99 mg/dL    BUN 17 8 - 23 mg/dL    Creatinine 0.81 0.57 - 1.00 mg/dL    Sodium 141 136 - 145 mmol/L    Potassium 4.4 3.5 - 5.2 mmol/L    Chloride 100 98 - 107 mmol/L    CO2 26.6 22.0 - 29.0 mmol/L    Calcium 9.4 8.6 - 10.5 mg/dL    Total Protein 7.5 6.0 - 8.5 g/dL    Albumin 4.50 3.50 - 5.20 g/dL    ALT (SGPT) 15 1 - 33 U/L    AST (SGOT) 19 1 - 32 U/L    Alkaline Phosphatase 67 39 - 117 U/L    Total Bilirubin 0.4 0.2 - 1.2 mg/dL    eGFR Non African Amer 71 >60 mL/min/1.73    Globulin 3.0 gm/dL    A/G Ratio 1.5 g/dL    BUN/Creatinine Ratio 21.0 7.0 - 25.0    Anion Gap 14.4 5.0 - 15.0 mmol/L   Lipid Panel   Result Value Ref Range    Total Cholesterol 220 (H) 0 - 200 mg/dL    Triglycerides 83 0 - 150 mg/dL    HDL Cholesterol 55 40 - 60 mg/dL    LDL Cholesterol  148 (H) 0 - 100 mg/dL    VLDL Cholesterol 16.6 5 - 40 mg/dL    LDL/HDL Ratio 2.70    Urinalysis without microscopic (no culture) - Urine, Clean Catch   Result Value Ref Range    Color, UA Yellow Yellow, Straw    Appearance, UA Turbid (A) Clear    pH, UA 8.0 5.0 - 8.0    Specific Gravity, UA 1.026 1.005 - 1.030    Glucose, UA Negative Negative    Ketones, UA Negative Negative    Bilirubin, UA Negative Negative    Blood, UA Negative Negative    Protein, UA Trace (A) Negative    Leuk Esterase, UA Negative Negative    Nitrite, UA Negative Negative    Urobilinogen, UA 0.2 E.U./dL 0.2 - 1.0 E.U./dL   Urinalysis, Microscopic Only - Urine, Clean Catch   Result Value Ref Range    RBC, UA 3-5 (A) None Seen, 0-2 /HPF    WBC, UA 0-2 None Seen, 0-2 /HPF    Bacteria, UA None Seen None Seen  /HPF    Squamous Epithelial Cells, UA 0-2 None Seen, 0-2 /HPF    Hyaline Casts, UA None Seen None Seen /LPF    Methodology Automated Microscopy    Tox[employer] Select 13 (MW) - Urine, Clean Catch   Result Value Ref Range    Report Summary FINAL       Assessment/Plan   Problem List Items Addressed This Visit        Cardiovascular and Mediastinum    Essential hypertension (Chronic)       Nervous and Auditory    Primary fibromyalgia syndrome (Chronic)    Relevant Medications    gabapentin (NEURONTIN) 300 MG capsule       Musculoskeletal and Integument    Degenerative disc disease, lumbar (Chronic)    Relevant Medications    gabapentin (NEURONTIN) 300 MG capsule       Other    Insomnia (Chronic)      Other Visit Diagnoses     Medicare annual wellness visit, subsequent    -  Primary    Mixed hyperlipidemia             Will notify regarding results. Continue current treatment.  Low-fat diet. [place] reviewed and appropriate.     New Medications Ordered This Visit   Medications   • gabapentin (NEURONTIN) 300 MG capsule     Sig: TAKE ONE CAPSULE BY MOUTH A DAY AND TAKE TWO CAPSULES EVERY NIGHT AT BEDTIME     Dispense:  270 capsule     Refill:  0     Return in about 3 months (around 3/12/2020) for Recheck.

## 2019-12-13 ENCOUNTER — TELEPHONE (OUTPATIENT)
Dept: FAMILY MEDICINE CLINIC | Facility: CLINIC | Age: 69
End: 2019-12-13

## 2019-12-13 NOTE — TELEPHONE ENCOUNTER
Pt picked up her tramadol at the pharmacy and there were only 14 pills. The pharmacy said it was the insurance. But when she called the insurance they said her prescription was only written for 14 pills. She wants to know if you can call the pharmacy and tell them it was for 180 pills.

## 2019-12-16 DIAGNOSIS — M51.36 DEGENERATIVE DISC DISEASE, LUMBAR: Chronic | ICD-10-CM

## 2019-12-16 DIAGNOSIS — M79.7 PRIMARY FIBROMYALGIA SYNDROME: Chronic | ICD-10-CM

## 2019-12-16 RX ORDER — TRAMADOL HYDROCHLORIDE 50 MG/1
50 TABLET ORAL 2 TIMES DAILY PRN
Qty: 180 TABLET | Refills: 0 | Status: SHIPPED | OUTPATIENT
Start: 2019-12-16 | End: 2020-02-03

## 2019-12-19 ENCOUNTER — APPOINTMENT (OUTPATIENT)
Dept: GENERAL RADIOLOGY | Facility: HOSPITAL | Age: 69
End: 2019-12-19

## 2019-12-19 ENCOUNTER — HOSPITAL ENCOUNTER (EMERGENCY)
Facility: HOSPITAL | Age: 69
Discharge: HOME | End: 2019-12-19
Attending: EMERGENCY MEDICINE | Admitting: EMERGENCY MEDICINE

## 2019-12-19 VITALS
RESPIRATION RATE: 18 BRPM | WEIGHT: 130.9 LBS | BODY MASS INDEX: 21.81 KG/M2 | HEIGHT: 65 IN | OXYGEN SATURATION: 99 % | HEART RATE: 70 BPM | SYSTOLIC BLOOD PRESSURE: 143 MMHG | DIASTOLIC BLOOD PRESSURE: 81 MMHG | TEMPERATURE: 98.8 F

## 2019-12-19 DIAGNOSIS — R19.8 CHANGE IN BOWEL MOVEMENT: Primary | ICD-10-CM

## 2019-12-19 DIAGNOSIS — R30.0 DYSURIA: ICD-10-CM

## 2019-12-19 LAB
BILIRUB UR QL STRIP: NEGATIVE
CLARITY UR: CLEAR
COLOR UR: YELLOW
GLUCOSE UR STRIP-MCNC: NEGATIVE MG/DL
HGB UR QL STRIP.AUTO: NEGATIVE
KETONES UR QL STRIP: ABNORMAL
LEUKOCYTE ESTERASE UR QL STRIP.AUTO: NEGATIVE
NITRITE UR QL STRIP: NEGATIVE
PH UR STRIP.AUTO: 8 [PH] (ref 5–9)
PROT UR QL STRIP: NEGATIVE
SP GR UR STRIP: 1.01 (ref 1–1.03)
UROBILINOGEN UR QL STRIP: ABNORMAL

## 2019-12-19 PROCEDURE — 81003 URINALYSIS AUTO W/O SCOPE: CPT | Performed by: NURSE PRACTITIONER

## 2019-12-19 PROCEDURE — 99283 EMERGENCY DEPT VISIT LOW MDM: CPT

## 2019-12-19 PROCEDURE — 74022 RADEX COMPL AQT ABD SERIES: CPT

## 2019-12-19 RX ORDER — FLUCONAZOLE 150 MG/1
150 TABLET ORAL ONCE
Qty: 1 TABLET | Refills: 0 | Status: SHIPPED | OUTPATIENT
Start: 2019-12-19 | End: 2019-12-19

## 2019-12-19 NOTE — ED PROVIDER NOTES
"Subjective   Patient presents to the ER with c/o issues with constipation for the past week. She states she has a BM daily but over the past week she has been unable to \"push\" her stool out without assistance of self disimpacting or use of fleets enemas. She states last use of enema was this AM which has produced liquid stool and mild abd cramping. She states she also has some dysuria that has presented. Other medications include daily stool softner and fiber. Denies rectal bleeding but reports she has had hemorrhoids in the past. C/o feeling of pressure to rectum area. Reports known history of cystocele and rectocele with use of vaginal pessary.           Review of Systems   Constitutional: Negative for fever.   Respiratory: Negative.    Cardiovascular: Negative.    Gastrointestinal: Positive for abdominal pain (intermittent cramping), constipation and diarrhea (looser stool with use of enema). Negative for anal bleeding, blood in stool, nausea, rectal pain and vomiting.   Genitourinary: Positive for dysuria. Negative for difficulty urinating, frequency, vaginal bleeding and vaginal discharge.   Neurological: Negative.        Past Medical History:   Diagnosis Date   • Allergic conjunctivitis    • Bacterial infection of skin    • Contact dermatitis due to poison ivy    • Depressive disorder    • Elevated blood pressure    • Encounter for general adult medical examination w/o abnormal findings    • Encounter for immunization    • Encounter for routine adult health examination    • Essential hypertension    • Fibrocystic breast    • Hip pain    • Lesion of lower eyelid     right   • Need for prophylactic vaccination against Streptococcus pneumoniae (pneumococcus)    • Need for prophylactic vaccination and inoculation against influenza    • Nuclear senile cataract    • Primary fibromyalgia syndrome    • Sciatica        Allergies   Allergen Reactions   • Benzodiazepines Confusion     trazadone  Was what she was allergic " "to   • Bystolic [Nebivolol Hcl] Confusion   • Flagyl [Metronidazole] Rash   • Hctz [Hydrochlorothiazide] Rash   • Hydralazine Hcl Rash   • Lisinopril Confusion   • Losartan Confusion   • Sulfa Antibiotics Rash   • Bacitracin Rash       Past Surgical History:   Procedure Laterality Date   • BREAST CYST ASPIRATION     • COLONOSCOPY  01/09/2006    repeat in 10 years   • EXCISION LESION  10/12/2012    Excision requiring a 6 cm incision & intermediate closure. Lipoma of the left upper back   • INJECTION OF MEDICATION  10/28/2015    Kenalog   • PAP SMEAR  11/05/2015    WNL, CARD SENT, DR. DAVIS'S OFFICE   • PRE-MALIGNANT / BENIGN SKIN LESION EXCISION  10/21/2012   • PROCEDURE GENERIC CONVERTED  11/07/2014    MOST RECENT DIASTOLIC BP > OR = 90 mmHg    • PROCEDURE GENERIC CONVERTED  11/07/2015    MOST RECENT SYSTOLIC BP > or = 140 mmHg    • PROCEDURE GENERIC CONVERTED  11/07/2015    TOBACCO NON-USER        Family History   Problem Relation Age of Onset   • Diabetes Other    • Heart disease Other    • Hypertension Other    • Colon cancer Other    • Hypertension Mother    • Hyperlipidemia Mother    • Diabetes Father    • Heart attack Father    • Hypertension Father    • Stroke Father    • Heart disease Father    • Cancer Maternal Aunt    • Cancer Maternal Uncle    • Arthritis Maternal Grandfather    • Diabetes Son    • Diabetes Paternal Grandfather    • Melanoma Brother        Social History     Socioeconomic History   • Marital status:      Spouse name: Not on file   • Number of children: Not on file   • Years of education: Not on file   • Highest education level: Not on file   Tobacco Use   • Smoking status: Never Smoker   • Smokeless tobacco: Never Used   Substance and Sexual Activity   • Alcohol use: No   • Drug use: No           Objective    /86 (BP Location: Left arm, Patient Position: Lying)   Pulse 74   Temp 98.8 °F (37.1 °C) (Oral)   Resp 18   Ht 165.1 cm (65\")   Wt 59.4 kg (130 lb 14.4 oz)   " SpO2 98%   BMI 21.78 kg/m²     Physical Exam   Constitutional: She is oriented to person, place, and time. She appears well-developed and well-nourished. No distress.   Cardiovascular: Normal rate, regular rhythm and normal heart sounds.   Pulmonary/Chest: Effort normal and breath sounds normal.   Abdominal: Soft. Bowel sounds are normal. She exhibits no distension. There is no tenderness.   Rectal exam completed with no fissures or external/internal hemorrhoids noted, no stool noted to rectal vault.   Musculoskeletal: Normal range of motion.   Neurological: She is alert and oriented to person, place, and time.   Skin: Skin is warm and dry. Capillary refill takes less than 2 seconds.   Psychiatric: She has a normal mood and affect. Her behavior is normal. Judgment and thought content normal.   Mildly anxious appearing       Procedures  Results for orders placed or performed during the hospital encounter of 12/19/19   Urinalysis With Microscopic If Indicated (No Culture) - Urine, Clean Catch   Result Value Ref Range    Color, UA Yellow Yellow, Straw, Dark Yellow, Judi    Appearance, UA Clear Clear    pH, UA 8.0 5.0 - 9.0    Specific Ceiba, UA 1.011 1.003 - 1.030    Glucose, UA Negative Negative    Ketones, UA 40 mg/dL (2+) (A) Negative    Bilirubin, UA Negative Negative    Blood, UA Negative Negative    Protein, UA Negative Negative    Leuk Esterase, UA Negative Negative    Nitrite, UA Negative Negative    Urobilinogen, UA 0.2 E.U./dL 0.2 - 1.0 E.U./dL              ED Course                      No data recorded                        MDM    Final diagnoses:   Change in bowel movement   Dysuria              Lynn Cast, APRN  12/19/19 0806

## 2019-12-19 NOTE — DISCHARGE INSTRUCTIONS
Stop using enemas and self disimpacting. Your X-ray does not show you are constipated. Follow up with your primary care next week for changes to your bowel habits.

## 2019-12-31 ENCOUNTER — TELEPHONE (OUTPATIENT)
Dept: FAMILY MEDICINE CLINIC | Facility: CLINIC | Age: 69
End: 2019-12-31

## 2019-12-31 NOTE — TELEPHONE ENCOUNTER
-Notes recorded by Shirley Ennis LPN on 12/31/2019 at 1:21 PM CST  Per Dr. Thomson, Ms. Sasha has been called with recent Left Diagnostic Mammogram results & recommendations.  Continue current medications and follow-up as planned or sooner if any problems.    ---- Message from Anay Thomson MD sent at 12/23/2019  5:35 PM CST -----  Call and tell repeat mammogram ok, just need to do next year as usual

## 2020-01-27 ENCOUNTER — TELEPHONE (OUTPATIENT)
Dept: FAMILY MEDICINE CLINIC | Facility: CLINIC | Age: 70
End: 2020-01-27

## 2020-01-27 RX ORDER — MELOXICAM 15 MG/1
15 TABLET ORAL DAILY PRN
Qty: 30 TABLET | Refills: 3 | Status: SHIPPED | OUTPATIENT
Start: 2020-01-27 | End: 2020-02-24

## 2020-01-27 NOTE — TELEPHONE ENCOUNTER
Ask if you would fill her mobic 15mg daily Gema Vance is the one who ordered it for her what shall I tell her?

## 2020-02-03 ENCOUNTER — TELEPHONE (OUTPATIENT)
Dept: FAMILY MEDICINE CLINIC | Facility: CLINIC | Age: 70
End: 2020-02-03

## 2020-02-03 ENCOUNTER — OFFICE VISIT (OUTPATIENT)
Dept: FAMILY MEDICINE CLINIC | Facility: CLINIC | Age: 70
End: 2020-02-03

## 2020-02-03 VITALS
WEIGHT: 124.5 LBS | BODY MASS INDEX: 20.74 KG/M2 | HEART RATE: 99 BPM | HEIGHT: 65 IN | DIASTOLIC BLOOD PRESSURE: 80 MMHG | OXYGEN SATURATION: 98 % | SYSTOLIC BLOOD PRESSURE: 150 MMHG

## 2020-02-03 DIAGNOSIS — G47.00 INSOMNIA, UNSPECIFIED TYPE: Chronic | ICD-10-CM

## 2020-02-03 DIAGNOSIS — M79.7 PRIMARY FIBROMYALGIA SYNDROME: Primary | Chronic | ICD-10-CM

## 2020-02-03 PROCEDURE — 99213 OFFICE O/P EST LOW 20 MIN: CPT | Performed by: GENERAL PRACTICE

## 2020-02-03 RX ORDER — DULOXETIN HYDROCHLORIDE 20 MG/1
20 CAPSULE, DELAYED RELEASE ORAL DAILY
Qty: 30 CAPSULE | Refills: 0 | Status: SHIPPED | OUTPATIENT
Start: 2020-02-03 | End: 2020-02-24

## 2020-02-03 RX ORDER — LORAZEPAM 1 MG/1
1 TABLET ORAL NIGHTLY
Qty: 120 TABLET | Refills: 0 | Status: SHIPPED | OUTPATIENT
Start: 2020-02-03 | End: 2020-03-12 | Stop reason: SDUPTHER

## 2020-02-03 NOTE — PROGRESS NOTES
Subjective   Angella Baez is a 69 y.o. female.   Chief Complaint   Patient presents with   • Follow-up     discuss med change   • Back Pain   • Leg Pain     Is having more leg pain related to her fibromyalgia. Thinks tramadol is causing her some side effects so would like to discontinue this.  She is not sure what else she can try.  Gabapentin is not keeping her symptoms controlled.  She has been on duloxetine in the past but was taking Paxil at the same time and did have some side effects.  She is wondering about Savella however this is not covered by her insurance.  She is seeing pain management for ablations.  Fibromyalgia   This is a chronic problem. The current episode started more than 1 year ago. The problem occurs constantly. The problem has been unchanged. Associated symptoms include anorexia, fatigue and myalgias. Pertinent negatives include no abdominal pain, arthralgias, chills, congestion, coughing, fever, joint swelling, nausea, numbness, rash, sore throat, vomiting or weakness. The symptoms are aggravated by stress. She has tried NSAIDs and oral narcotics (gabapentin) for the symptoms. The treatment provided mild relief.   Back Pain   This is a chronic problem. The current episode started more than 1 year ago. The problem occurs constantly. The problem is unchanged. The pain is present in the lumbar spine and gluteal. The pain radiates to the right foot and left foot. The pain is at a severity of 5/10. The pain is moderate. The pain is worse during the day. The symptoms are aggravated by bending and standing. Stiffness is present in the morning. Pertinent negatives include no abdominal pain, dysuria, fever, numbness or weakness. She has tried analgesics (gabapentin, epidural injection) for the symptoms. The treatment provided moderate relief.   Insomnia   This is a chronic problem. The current episode started more than 1 year ago. The problem occurs constantly. The problem has been  unchanged. Associated symptoms include anorexia, fatigue and myalgias. Pertinent negatives include no abdominal pain, arthralgias, chills, congestion, coughing, fever, joint swelling, nausea, numbness, rash, sore throat, vomiting or weakness. The symptoms are aggravated by stress. Treatments tried: lorazepam. The treatment provided significant relief.      The following portions of the patient's history were reviewed and updated as appropriate: allergies, current medications, past social history and problem list.    Outpatient Medications Prior to Visit   Medication Sig Dispense Refill   • docusate sodium (COLACE) 100 MG capsule Take 100 mg by mouth every night. Takes 4 cap nightly     • gabapentin (NEURONTIN) 300 MG capsule TAKE ONE CAPSULE BY MOUTH A DAY AND TAKE TWO CAPSULES EVERY NIGHT AT BEDTIME 270 capsule 0   • Magnesium 250 MG tablet Take 4 tablets by mouth.     • meloxicam (MOBIC) 15 MG tablet Take 1 tablet by mouth Daily As Needed for Moderate Pain . 30 tablet 3   • spironolactone (ALDACTONE) 25 MG tablet Take 1 tablet by mouth Daily. 90 tablet 3   • LORazepam (ATIVAN) 1 MG tablet Take 1 tablet by mouth Every Night. May repeat x 1 prn 120 tablet 0   • traMADol (ULTRAM) 50 MG tablet Take 1 tablet by mouth 2 (Two) Times a Day As Needed for Moderate Pain . 180 tablet 0   • desoximetasone (TOPICORT) 0.25 % cream        No facility-administered medications prior to visit.        Review of Systems   Constitutional: Positive for fatigue. Negative for chills, fever and unexpected weight change.   HENT: Negative.  Negative for congestion, ear pain, hearing loss, nosebleeds, rhinorrhea, sneezing, sore throat and tinnitus.    Eyes: Negative.  Negative for discharge.   Respiratory: Negative.  Negative for cough and wheezing.    Cardiovascular: Negative.    Gastrointestinal: Positive for anorexia. Negative for abdominal pain, constipation, diarrhea, nausea and vomiting.   Endocrine: Negative.    Genitourinary:  "Negative.  Negative for dysuria, frequency and urgency.   Musculoskeletal: Positive for back pain and myalgias. Negative for arthralgias and joint swelling.   Skin: Negative.  Negative for rash.   Allergic/Immunologic: Negative.    Neurological: Negative.  Negative for dizziness, weakness and numbness.   Hematological: Negative.  Negative for adenopathy.   Psychiatric/Behavioral: Negative for dysphoric mood and sleep disturbance. The patient has insomnia. The patient is not nervous/anxious.        Objective   Visit Vitals  /80   Pulse 99   Ht 165.1 cm (65\")   Wt 56.5 kg (124 lb 8 oz)   SpO2 98%   BMI 20.72 kg/m²     Physical Exam   Constitutional: She is oriented to person, place, and time. She appears well-developed and well-nourished. No distress.   HENT:   Head: Normocephalic and atraumatic.   Nose: Nose normal.   Mouth/Throat: Oropharynx is clear and moist.   Eyes: Pupils are equal, round, and reactive to light. Conjunctivae and EOM are normal. Right eye exhibits no discharge. Left eye exhibits no discharge.   Neck: No thyromegaly present.   Cardiovascular: Normal rate, regular rhythm, normal heart sounds and intact distal pulses.   Pulmonary/Chest: Effort normal and breath sounds normal.   Musculoskeletal:   Multiple tender points, muscles of posterior upper leg are tender to palpation   Lymphadenopathy:     She has no cervical adenopathy.   Neurological: She is alert and oriented to person, place, and time.   Skin: Skin is warm and dry.   Psychiatric: She has a normal mood and affect.   Nursing note and vitals reviewed.    Assessment/Plan   Problem List Items Addressed This Visit        Nervous and Auditory    Primary fibromyalgia syndrome - Primary (Chronic)    Relevant Medications    DULoxetine (CYMBALTA) 20 MG capsule       Other    Insomnia (Chronic)    Relevant Medications    LORazepam (ATIVAN) 1 MG tablet         Start low-dose duloxetine.  Hopefully we can increase this to be effective without " side effects.  Wean off the tramadol.  Recheck in 2 weeks, may consider a steroid injection at that time.    New Medications Ordered This Visit   Medications   • DULoxetine (CYMBALTA) 20 MG capsule     Sig: Take 1 capsule by mouth Daily.     Dispense:  30 capsule     Refill:  0   • LORazepam (ATIVAN) 1 MG tablet     Sig: Take 1 tablet by mouth Every Night. May repeat x 1 prn     Dispense:  120 tablet     Refill:  0     Return in about 2 weeks (around 2/17/2020) for Recheck.

## 2020-02-24 ENCOUNTER — OFFICE VISIT (OUTPATIENT)
Dept: FAMILY MEDICINE CLINIC | Facility: CLINIC | Age: 70
End: 2020-02-24

## 2020-02-24 VITALS
HEIGHT: 65 IN | OXYGEN SATURATION: 99 % | SYSTOLIC BLOOD PRESSURE: 160 MMHG | BODY MASS INDEX: 20.66 KG/M2 | DIASTOLIC BLOOD PRESSURE: 80 MMHG | HEART RATE: 80 BPM | WEIGHT: 124 LBS

## 2020-02-24 DIAGNOSIS — F32.9 REACTIVE DEPRESSION: ICD-10-CM

## 2020-02-24 DIAGNOSIS — M51.36 DEGENERATIVE DISC DISEASE, LUMBAR: Chronic | ICD-10-CM

## 2020-02-24 DIAGNOSIS — M79.7 PRIMARY FIBROMYALGIA SYNDROME: Primary | Chronic | ICD-10-CM

## 2020-02-24 PROCEDURE — 99214 OFFICE O/P EST MOD 30 MIN: CPT | Performed by: GENERAL PRACTICE

## 2020-02-24 PROCEDURE — 96372 THER/PROPH/DIAG INJ SC/IM: CPT | Performed by: GENERAL PRACTICE

## 2020-02-24 RX ORDER — TRIAMCINOLONE ACETONIDE 40 MG/ML
60 INJECTION, SUSPENSION INTRA-ARTICULAR; INTRAMUSCULAR ONCE
Status: COMPLETED | OUTPATIENT
Start: 2020-02-24 | End: 2020-02-24

## 2020-02-24 RX ORDER — FLUOXETINE 10 MG/1
10 CAPSULE ORAL DAILY
Qty: 30 CAPSULE | Refills: 0 | Status: SHIPPED | OUTPATIENT
Start: 2020-02-24 | End: 2020-03-19

## 2020-02-24 RX ADMIN — TRIAMCINOLONE ACETONIDE 60 MG: 40 INJECTION, SUSPENSION INTRA-ARTICULAR; INTRAMUSCULAR at 14:42

## 2020-02-24 NOTE — PROGRESS NOTES
Subjective   Angella Baez is a 69 y.o. female.   Chief Complaint   Patient presents with   • Back Pain   • Follow-up     taylor after new med started     For review and evaluation of management of chronic medical problems.  She has not been able to tolerate the duloxetine as it is causing her blood pressure to go up.  She has had a similar problem in the past.  She is still having a lot of pain from her fibromyalgia as well as her degenerative disc disease.  [place] go [place] and is wondering whether a steroid shot would help her.  Is also wondering about something for depression as she did find that while taking the duloxetine emotionally she was feeling better.  Fibromyalgia   This is a chronic problem. The current episode started more than 1 year ago. The problem occurs constantly. The problem has been unchanged. Associated symptoms include myalgias. The symptoms are aggravated by stress. She has tried NSAIDs and oral narcotics (gabapentin) for the symptoms. The treatment provided mild relief.   Back Pain   This is a chronic problem. The current episode started more than 1 year ago. The problem occurs constantly. The problem is unchanged. The pain is present in the lumbar spine and gluteal. The pain radiates to the right foot and left foot. The pain is at a severity of 4/10. The pain is moderate. The pain is worse during the day. The symptoms are aggravated by bending and standing. Stiffness is present in the morning. Pertinent negatives include no dysuria. She has tried analgesics (gabapentin, epidural injection) for the symptoms. The treatment provided moderate relief.      The following portions of the patient's history were reviewed and updated as appropriate: allergies, current medications, past social history and problem list.    Outpatient Medications Prior to Visit   Medication Sig Dispense Refill   • docusate sodium (COLACE) 100 MG capsule Take 100 mg by mouth every night. Takes 4 cap  "nightly     • gabapentin (NEURONTIN) 300 MG capsule TAKE ONE CAPSULE BY MOUTH A DAY AND TAKE TWO CAPSULES EVERY NIGHT AT BEDTIME 270 capsule 0   • LORazepam (ATIVAN) 1 MG tablet Take 1 tablet by mouth Every Night. May repeat x 1 prn 120 tablet 0   • Magnesium 250 MG tablet Take 4 tablets by mouth.     • spironolactone (ALDACTONE) 25 MG tablet Take 1 tablet by mouth Daily. 90 tablet 3   • TRAMADOL HCL PO Take 50 mg by mouth Daily. Takes 1/2 in am and 1/2 in pm     • DULoxetine (CYMBALTA) 20 MG capsule Take 1 capsule by mouth Daily. 30 capsule 0   • meloxicam (MOBIC) 15 MG tablet Take 1 tablet by mouth Daily As Needed for Moderate Pain . 30 tablet 3     No facility-administered medications prior to visit.        Review of Systems   Constitutional: Negative for unexpected weight change.   HENT: Negative.  Negative for ear pain, hearing loss, nosebleeds, rhinorrhea, sneezing and tinnitus.    Eyes: Negative.  Negative for discharge.   Respiratory: Negative.  Negative for shortness of breath and wheezing.    Cardiovascular: Negative.  Negative for palpitations.   Gastrointestinal: Negative for constipation and diarrhea.   Endocrine: Negative.    Genitourinary: Negative.  Negative for dysuria, frequency and urgency.   Musculoskeletal: Positive for back pain and myalgias.   Skin: Negative.    Allergic/Immunologic: Negative.    Neurological: Negative.  Negative for dizziness.   Hematological: Negative.  Negative for adenopathy.   Psychiatric/Behavioral: Negative for dysphoric mood and sleep disturbance. The patient is not nervous/anxious.        Objective   Visit Vitals  /80   Pulse 80   Ht 165.1 cm (65\")   Wt 56.2 kg (124 lb)   SpO2 99%   BMI 20.63 kg/m²     Physical Exam   Constitutional: She is oriented to person, place, and time. She appears well-developed and well-nourished. No distress.   HENT:   Head: Normocephalic and atraumatic.   Nose: Nose normal.   Mouth/Throat: Oropharynx is clear and moist.   Eyes: Pupils " are equal, round, and reactive to light. Conjunctivae and EOM are normal. Right eye exhibits no discharge. Left eye exhibits no discharge.   Neck: No thyromegaly present.   Cardiovascular: Normal rate, regular rhythm, normal heart sounds and intact distal pulses.   Pulmonary/Chest: Effort normal and breath sounds normal.   Musculoskeletal:   Multiple tender points, muscles of posterior upper leg are tender to palpation   Lymphadenopathy:     She has no cervical adenopathy.   Neurological: She is alert and oriented to person, place, and time.   Skin: Skin is warm and dry.   Psychiatric: She has a normal mood and affect.   Nursing note and vitals reviewed.    Notes brought forward are reviewed and updated if indicated.    Assessment/Plan   Problem List Items Addressed This Visit        Nervous and Auditory    Primary fibromyalgia syndrome - Primary (Chronic)    Relevant Medications    triamcinolone acetonide (KENALOG-40) injection 60 mg (Completed)       Musculoskeletal and Integument    Degenerative disc disease, lumbar (Chronic)      Other Visit Diagnoses     Reactive depression        Relevant Medications    FLUoxetine (PROZAC) 10 MG capsule         Continue current treatment.  Start fluoxetine for depression.  Monitor blood pressure.    New Medications Ordered This Visit   Medications   • triamcinolone acetonide (KENALOG-40) injection 60 mg   • FLUoxetine (PROZAC) 10 MG capsule     Sig: Take 1 capsule by mouth Daily.     Dispense:  30 capsule     Refill:  0     Return if symptoms worsen or fail to improve, for Next scheduled follow up.

## 2020-03-12 ENCOUNTER — OFFICE VISIT (OUTPATIENT)
Dept: FAMILY MEDICINE CLINIC | Facility: CLINIC | Age: 70
End: 2020-03-12

## 2020-03-12 ENCOUNTER — APPOINTMENT (OUTPATIENT)
Dept: LAB | Facility: HOSPITAL | Age: 70
End: 2020-03-12

## 2020-03-12 VITALS
DIASTOLIC BLOOD PRESSURE: 82 MMHG | BODY MASS INDEX: 20.66 KG/M2 | WEIGHT: 124 LBS | HEART RATE: 105 BPM | OXYGEN SATURATION: 99 % | HEIGHT: 65 IN | SYSTOLIC BLOOD PRESSURE: 140 MMHG

## 2020-03-12 DIAGNOSIS — G47.00 INSOMNIA, UNSPECIFIED TYPE: Chronic | ICD-10-CM

## 2020-03-12 DIAGNOSIS — M51.36 DEGENERATIVE DISC DISEASE, LUMBAR: Chronic | ICD-10-CM

## 2020-03-12 DIAGNOSIS — M79.7 PRIMARY FIBROMYALGIA SYNDROME: Primary | Chronic | ICD-10-CM

## 2020-03-12 LAB
ANION GAP SERPL CALCULATED.3IONS-SCNC: 10.8 MMOL/L (ref 5–15)
BUN BLD-MCNC: 18 MG/DL (ref 8–23)
BUN/CREAT SERPL: 20.9 (ref 7–25)
CALCIUM SPEC-SCNC: 9.4 MG/DL (ref 8.6–10.5)
CHLORIDE SERPL-SCNC: 97 MMOL/L (ref 98–107)
CO2 SERPL-SCNC: 27.2 MMOL/L (ref 22–29)
CREAT BLD-MCNC: 0.86 MG/DL (ref 0.57–1)
GFR SERPL CREATININE-BSD FRML MDRD: 66 ML/MIN/1.73
GLUCOSE BLD-MCNC: 89 MG/DL (ref 65–99)
POTASSIUM BLD-SCNC: 4.1 MMOL/L (ref 3.5–5.2)
SODIUM BLD-SCNC: 135 MMOL/L (ref 136–145)
TSH SERPL DL<=0.05 MIU/L-ACNC: 0.85 UIU/ML (ref 0.27–4.2)

## 2020-03-12 PROCEDURE — 99214 OFFICE O/P EST MOD 30 MIN: CPT | Performed by: GENERAL PRACTICE

## 2020-03-12 PROCEDURE — 80048 BASIC METABOLIC PNL TOTAL CA: CPT | Performed by: GENERAL PRACTICE

## 2020-03-12 PROCEDURE — 36415 COLL VENOUS BLD VENIPUNCTURE: CPT | Performed by: GENERAL PRACTICE

## 2020-03-12 PROCEDURE — 84443 ASSAY THYROID STIM HORMONE: CPT | Performed by: GENERAL PRACTICE

## 2020-03-12 RX ORDER — LORAZEPAM 1 MG/1
1 TABLET ORAL NIGHTLY
Qty: 120 TABLET | Refills: 0 | Status: SHIPPED | OUTPATIENT
Start: 2020-03-12 | End: 2020-06-02 | Stop reason: SDUPTHER

## 2020-03-12 RX ORDER — TRAMADOL HYDROCHLORIDE 50 MG/1
50 TABLET ORAL EVERY 8 HOURS PRN
Qty: 270 TABLET | Refills: 0 | Status: SHIPPED | OUTPATIENT
Start: 2020-03-12 | End: 2020-03-13 | Stop reason: SDUPTHER

## 2020-03-12 RX ORDER — GABAPENTIN 300 MG/1
CAPSULE ORAL
Qty: 270 CAPSULE | Refills: 0 | Status: SHIPPED | OUTPATIENT
Start: 2020-03-12 | End: 2020-06-30

## 2020-03-12 NOTE — PROGRESS NOTES
Subjective   Angella Baez is a 69 y.o. female.   Chief Complaint   Patient presents with   • Leg Pain   • Med Refill     For review and evaluation of management of chronic medical problems.   Fibromyalgia   This is a chronic problem. The current episode started more than 1 year ago. The problem occurs constantly. The problem has been unchanged. Associated symptoms include myalgias. Pertinent negatives include no abdominal pain, arthralgias, chest pain, chills, congestion, coughing, fatigue, fever, headaches, joint swelling, nausea, neck pain, numbness, rash, sore throat, vomiting or weakness. The symptoms are aggravated by stress. She has tried NSAIDs and oral narcotics (gabapentin) for the symptoms. The treatment provided mild relief.   Back Pain   This is a chronic problem. The current episode started more than 1 year ago. The problem occurs constantly. The problem is unchanged. The pain is present in the lumbar spine and gluteal. The pain radiates to the right foot and left foot. The pain is at a severity of 4/10. The pain is moderate. The pain is worse during the day. The symptoms are aggravated by bending and standing. Stiffness is present in the morning. Pertinent negatives include no abdominal pain, chest pain, dysuria, fever, headaches, numbness or weakness. She has tried analgesics (gabapentin, epidural injection) for the symptoms. The treatment provided moderate relief.   Hypertension   This is a chronic problem. The current episode started more than 1 year ago. The problem has been gradually worsening since onset. Pertinent negatives include no chest pain, headaches, neck pain, palpitations or shortness of breath. There are no associated agents to hypertension. Risk factors for coronary artery disease include stress (pain). Past treatments include diuretics. The current treatment provides moderate improvement. There are no compliance problems.       The following portions of the patient's  history were reviewed and updated as appropriate: allergies, current medications, past social history and problem list.    Outpatient Medications Prior to Visit   Medication Sig Dispense Refill   • docusate sodium (COLACE) 100 MG capsule Take 100 mg by mouth every night. Takes 4 cap nightly     • FLUoxetine (PROZAC) 10 MG capsule Take 1 capsule by mouth Daily. 30 capsule 0   • Magnesium 250 MG tablet Take 4 tablets by mouth.     • spironolactone (ALDACTONE) 25 MG tablet Take 1 tablet by mouth Daily. 90 tablet 3   • gabapentin (NEURONTIN) 300 MG capsule TAKE ONE CAPSULE BY MOUTH A DAY AND TAKE TWO CAPSULES EVERY NIGHT AT BEDTIME 270 capsule 0   • LORazepam (ATIVAN) 1 MG tablet Take 1 tablet by mouth Every Night. May repeat x 1 prn 120 tablet 0   • TRAMADOL HCL PO Take 50 mg by mouth Daily. Takes 1/2 in am and 1/2 in pm       No facility-administered medications prior to visit.        Review of Systems   Constitutional: Negative.  Negative for chills, fatigue, fever and unexpected weight change.   HENT: Negative.  Negative for congestion, ear pain, hearing loss, nosebleeds, rhinorrhea, sneezing, sore throat and tinnitus.    Eyes: Negative.  Negative for discharge.   Respiratory: Negative.  Negative for cough, shortness of breath and wheezing.    Cardiovascular: Negative.  Negative for chest pain and palpitations.   Gastrointestinal: Negative.  Negative for abdominal pain, constipation, diarrhea, nausea and vomiting.   Endocrine: Negative.    Genitourinary: Negative.  Negative for dysuria, frequency and urgency.   Musculoskeletal: Positive for back pain and myalgias. Negative for arthralgias, joint swelling and neck pain.   Skin: Negative.  Negative for rash.   Allergic/Immunologic: Negative.    Neurological: Negative.  Negative for dizziness, weakness, numbness and headaches.   Hematological: Negative.  Negative for adenopathy.   Psychiatric/Behavioral: Negative.  Negative for dysphoric mood and sleep disturbance. The  "patient is not nervous/anxious.      Objective   Visit Vitals  /82   Pulse 105   Ht 165.1 cm (65\")   Wt 56.2 kg (124 lb)   SpO2 99%   BMI 20.63 kg/m²     Physical Exam   Constitutional: She is oriented to person, place, and time. She appears well-developed. No distress.   HENT:   Head: Normocephalic and atraumatic.   Nose: Nose normal.   Mouth/Throat: Oropharynx is clear and moist.   Eyes: Pupils are equal, round, and reactive to light. Conjunctivae and EOM are normal. Right eye exhibits no discharge. Left eye exhibits no discharge.   Neck: No thyromegaly present.   Cardiovascular: Normal rate, regular rhythm, normal heart sounds and intact distal pulses.   Pulmonary/Chest: Effort normal and breath sounds normal.   Lymphadenopathy:     She has no cervical adenopathy.   Neurological: She is alert and oriented to person, place, and time.   Skin: Skin is warm and dry.   Psychiatric: She has a normal mood and affect.   Nursing note and vitals reviewed.    Notes brought forward are reviewed and updated if indicated.    Assessment/Plan   Problem List Items Addressed This Visit        Nervous and Auditory    Primary fibromyalgia syndrome - Primary (Chronic)    Relevant Medications    gabapentin (NEURONTIN) 300 MG capsule    traMADol (ULTRAM) 50 MG tablet    Other Relevant Orders    Basic Metabolic Panel    TSH       Musculoskeletal and Integument    Degenerative disc disease, lumbar (Chronic)    Relevant Medications    gabapentin (NEURONTIN) 300 MG capsule    traMADol (ULTRAM) 50 MG tablet       Other    Insomnia (Chronic)    Relevant Medications    LORazepam (ATIVAN) 1 MG tablet          Continue current treatment. [place] reviewed and appropriate. Not recommended to drive or operate heavy equipment while taking potentially sedating meds.  Patient understands the risks associated with this controlled medication, including tolerance and addiction. They also agree to obtain this medication only from me, and not from a " another provider, unless that provider is covering for me in my absence. They also agree to be compliant in dosing, and not self adjust the dose of medication.  A signed controlled substance agreement is on file, and they have received a controlled substance education sheet at this or a previous visit. They have also signed a consent for treatment with a controlled substance as per [facility] policy.      New Medications Ordered This Visit   Medications   • gabapentin (NEURONTIN) 300 MG capsule     Sig: TAKE ONE CAPSULE BY MOUTH A DAY AND TAKE TWO CAPSULES EVERY NIGHT AT BEDTIME     Dispense:  270 capsule     Refill:  0   • traMADol (ULTRAM) 50 MG tablet     Sig: Take 1 tablet by mouth Every 8 (Eight) Hours As Needed for Severe Pain . Takes 1/2 in am and 1/2 in pm     Dispense:  270 tablet     Refill:  0   • LORazepam (ATIVAN) 1 MG tablet     Sig: Take 1 tablet by mouth Every Night. May repeat x 1 prn     Dispense:  120 tablet     Refill:  0     Return in about 3 months (around 6/12/2020) for Recheck.

## 2020-03-13 ENCOUNTER — TELEPHONE (OUTPATIENT)
Dept: FAMILY MEDICINE CLINIC | Facility: CLINIC | Age: 70
End: 2020-03-13

## 2020-03-13 DIAGNOSIS — M79.7 PRIMARY FIBROMYALGIA SYNDROME: Chronic | ICD-10-CM

## 2020-03-13 DIAGNOSIS — M51.36 DEGENERATIVE DISC DISEASE, LUMBAR: Chronic | ICD-10-CM

## 2020-03-13 RX ORDER — TRAMADOL HYDROCHLORIDE 50 MG/1
50 TABLET ORAL EVERY 8 HOURS PRN
Qty: 270 TABLET | Refills: 0 | Status: SHIPPED | OUTPATIENT
Start: 2020-03-13 | End: 2020-06-02 | Stop reason: SDUPTHER

## 2020-03-13 NOTE — TELEPHONE ENCOUNTER
Dr. Thomson,    [facility] needs clarification on her Tramadol  Is it suppose to be take 1/2 tablet BID?    Please Advise    Thank you

## 2020-03-19 RX ORDER — FLUOXETINE 10 MG/1
CAPSULE ORAL
Qty: 30 CAPSULE | Refills: 0 | Status: SHIPPED | OUTPATIENT
Start: 2020-03-19 | End: 2020-04-16 | Stop reason: SDUPTHER

## 2020-04-16 RX ORDER — FLUOXETINE 10 MG/1
10 CAPSULE ORAL DAILY
Qty: 90 CAPSULE | Refills: 0 | Status: SHIPPED | OUTPATIENT
Start: 2020-04-16 | End: 2020-07-21

## 2020-06-02 DIAGNOSIS — G47.00 INSOMNIA, UNSPECIFIED TYPE: Chronic | ICD-10-CM

## 2020-06-02 DIAGNOSIS — M79.7 PRIMARY FIBROMYALGIA SYNDROME: Chronic | ICD-10-CM

## 2020-06-02 DIAGNOSIS — M51.36 DEGENERATIVE DISC DISEASE, LUMBAR: Chronic | ICD-10-CM

## 2020-06-02 RX ORDER — TRAMADOL HYDROCHLORIDE 50 MG/1
50 TABLET ORAL EVERY 8 HOURS PRN
Qty: 270 TABLET | Refills: 0 | Status: SHIPPED | OUTPATIENT
Start: 2020-06-02 | End: 2020-12-17 | Stop reason: SDUPTHER

## 2020-06-02 RX ORDER — LORAZEPAM 1 MG/1
1 TABLET ORAL NIGHTLY
Qty: 120 TABLET | Refills: 0 | Status: SHIPPED | OUTPATIENT
Start: 2020-06-02 | End: 2020-06-02 | Stop reason: SDUPTHER

## 2020-06-02 RX ORDER — LORAZEPAM 1 MG/1
1 TABLET ORAL NIGHTLY
Qty: 120 TABLET | Refills: 0 | Status: SHIPPED | OUTPATIENT
Start: 2020-06-02 | End: 2020-08-17

## 2020-06-02 NOTE — TELEPHONE ENCOUNTER
Called in to request RX refill of traMADol (ULTRAM) 50 MG tablet, LORazepam (ATIVAN) 1 MG tablet    Patient doesn't feel comfortable coming into office. Would like refills only    Pharmacy confirmed - [facility]/pharm[facility] #[id number] - [place], [place] - [address] [facility] - [phone]  - [phone]   [phone]    Please Advise

## 2020-06-02 NOTE — TELEPHONE ENCOUNTER
Dr. Thomson    Ms. Angella Baez is requesting a refill on 2 of her controlled meds:    Ativan 1 mg  #120   Last Script Written 03/12/2020 #120 with No Refills  Tramadol 50 mg  #270 Last Script Written 03/13/2020 #270 with No Refills     Last OV    03/12/2020    Next Priyanka OV    9/10/2020    Last Script Written  See Above      Last [place]    03/12/2020    Please advise on refill    Thank you

## 2020-06-10 DIAGNOSIS — I10 ESSENTIAL HYPERTENSION: Chronic | ICD-10-CM

## 2020-06-10 RX ORDER — SPIRONOLACTONE 25 MG/1
TABLET ORAL
Qty: 180 TABLET | Refills: 1 | Status: SHIPPED | OUTPATIENT
Start: 2020-06-10 | End: 2021-06-28

## 2020-06-30 DIAGNOSIS — M51.36 DEGENERATIVE DISC DISEASE, LUMBAR: Chronic | ICD-10-CM

## 2020-06-30 DIAGNOSIS — M79.7 PRIMARY FIBROMYALGIA SYNDROME: Chronic | ICD-10-CM

## 2020-06-30 RX ORDER — GABAPENTIN 300 MG/1
CAPSULE ORAL
Qty: 270 CAPSULE | Refills: 0 | Status: SHIPPED | OUTPATIENT
Start: 2020-06-30 | End: 2020-09-10 | Stop reason: SDUPTHER

## 2020-07-21 RX ORDER — FLUOXETINE 10 MG/1
CAPSULE ORAL
Qty: 90 CAPSULE | Refills: 1 | Status: SHIPPED | OUTPATIENT
Start: 2020-07-21 | End: 2020-09-10 | Stop reason: SINTOL

## 2020-08-17 DIAGNOSIS — G47.00 INSOMNIA, UNSPECIFIED TYPE: Chronic | ICD-10-CM

## 2020-08-17 RX ORDER — LORAZEPAM 1 MG/1
TABLET ORAL
Qty: 60 TABLET | Refills: 1 | Status: SHIPPED | OUTPATIENT
Start: 2020-08-17 | End: 2020-09-10 | Stop reason: SDUPTHER

## 2020-08-17 NOTE — TELEPHONE ENCOUNTER
Dr. Thomson    Ms. Angella Baez is requesting a refill on her Ativan 1 mg #120     Last OV    03/13/2020 (PC appt 06/11/2020 due to Covid)    Next Priyanka OV    9/10/2020    Last Script Written  06/02/2020  #120 with No Refill      Last [place]    03/12/2020     Please advise on refill    Thank you

## 2020-09-03 ENCOUNTER — TELEPHONE (OUTPATIENT)
Dept: FAMILY MEDICINE CLINIC | Facility: CLINIC | Age: 70
End: 2020-09-03

## 2020-09-03 NOTE — TELEPHONE ENCOUNTER
PATIENT CALLED STATED SHE WOULD LIKE A CALL BACK AS HER PROZAC ISN'T WORKING WELL FOR HER AND SHE WOULD LIKE ANOTHER OPTION.    PLEASE CALL BACK AND ADVISE  [phone]

## 2020-09-10 ENCOUNTER — OFFICE VISIT (OUTPATIENT)
Dept: FAMILY MEDICINE CLINIC | Facility: CLINIC | Age: 70
End: 2020-09-10

## 2020-09-10 DIAGNOSIS — I10 ESSENTIAL HYPERTENSION: Chronic | ICD-10-CM

## 2020-09-10 DIAGNOSIS — M79.7 PRIMARY FIBROMYALGIA SYNDROME: Chronic | ICD-10-CM

## 2020-09-10 DIAGNOSIS — G47.00 INSOMNIA, UNSPECIFIED TYPE: Chronic | ICD-10-CM

## 2020-09-10 DIAGNOSIS — F33.41 MAJOR DEPRESSIVE DISORDER, RECURRENT EPISODE, IN PARTIAL REMISSION (HCC): Primary | ICD-10-CM

## 2020-09-10 DIAGNOSIS — M51.36 DEGENERATIVE DISC DISEASE, LUMBAR: Chronic | ICD-10-CM

## 2020-09-10 PROCEDURE — 99214 OFFICE O/P EST MOD 30 MIN: CPT | Performed by: GENERAL PRACTICE

## 2020-09-10 PROCEDURE — 96372 THER/PROPH/DIAG INJ SC/IM: CPT | Performed by: GENERAL PRACTICE

## 2020-09-10 RX ORDER — ESCITALOPRAM OXALATE 5 MG/1
5 TABLET ORAL DAILY
Qty: 30 TABLET | Refills: 2 | Status: SHIPPED | OUTPATIENT
Start: 2020-09-10 | End: 2020-12-17 | Stop reason: SINTOL

## 2020-09-10 RX ORDER — LORAZEPAM 1 MG/1
TABLET ORAL
Qty: 60 TABLET | Refills: 2 | Status: SHIPPED | OUTPATIENT
Start: 2020-09-10 | End: 2020-12-17 | Stop reason: SDUPTHER

## 2020-09-10 RX ORDER — GABAPENTIN 300 MG/1
CAPSULE ORAL
Qty: 270 CAPSULE | Refills: 0 | Status: SHIPPED | OUTPATIENT
Start: 2020-09-10 | End: 2020-12-17 | Stop reason: SDUPTHER

## 2020-09-10 RX ORDER — TRIAMCINOLONE ACETONIDE 40 MG/ML
60 INJECTION, SUSPENSION INTRA-ARTICULAR; INTRAMUSCULAR ONCE
Status: COMPLETED | OUTPATIENT
Start: 2020-09-10 | End: 2020-09-10

## 2020-09-10 RX ADMIN — TRIAMCINOLONE ACETONIDE 60 MG: 40 INJECTION, SUSPENSION INTRA-ARTICULAR; INTRAMUSCULAR at 14:00

## 2020-09-10 NOTE — PROGRESS NOTES
Subjective   Angella Baez is a 70 y.o. female.   Chief Complaint   Patient presents with   • Depression   • Hypertension   • Fibromyalgia   • Insomnia   • Back Pain     For review and evaluation of management of chronic medical problems. Had a pain in the right face that radiated down neck and into right chest, lasted for a few minutes. Depression is not controlled. Is having more anxiety.   Fibromyalgia   This is a chronic problem. The current episode started more than 1 year ago. The problem occurs constantly. The problem has been unchanged. Associated symptoms include myalgias. Pertinent negatives include no abdominal pain, arthralgias, chest pain, chills, congestion, coughing, fatigue, fever, headaches, joint swelling, nausea, neck pain, numbness, rash, sore throat, vomiting or weakness. The symptoms are aggravated by stress. She has tried NSAIDs and oral narcotics (gabapentin) for the symptoms. The treatment provided mild relief.   Back Pain   This is a chronic problem. The current episode started more than 1 year ago. The problem occurs constantly. The problem is unchanged. The pain is present in the lumbar spine and gluteal. The pain radiates to the right foot and left foot. The pain is at a severity of 4/10. The pain is moderate. The pain is worse during the day. The symptoms are aggravated by bending and standing. Stiffness is present in the morning. Pertinent negatives include no abdominal pain, chest pain, dysuria, fever, headaches, numbness or weakness. She has tried analgesics (gabapentin, epidural injection) for the symptoms. The treatment provided moderate relief.   Hypertension  This is a chronic problem. The current episode started more than 1 year ago. The problem has been gradually worsening since onset. Pertinent negatives include no chest pain, headaches or neck pain. There are no associated agents to hypertension. Risk factors for coronary artery disease include stress (pain). Current  antihypertension treatment includes diuretics. The current treatment provides moderate improvement. There are no compliance problems.    Insomnia  This is a chronic problem. The current episode started more than 1 year ago. The problem occurs constantly. The problem has been unchanged. Associated symptoms include myalgias. Pertinent negatives include no abdominal pain, arthralgias, chest pain, chills, congestion, coughing, fatigue, fever, headaches, joint swelling, nausea, neck pain, numbness, rash, sore throat, vomiting or weakness. The symptoms are aggravated by stress. Treatments tried: lorazepam. The treatment provided significant relief.      The following portions of the patient's history were reviewed and updated as appropriate: allergies, current medications, past social history and problem list.    Outpatient Medications Prior to Visit   Medication Sig Dispense Refill   • docusate sodium (COLACE) 100 MG capsule Take 100 mg by mouth every night. Takes 4 cap nightly     • Magnesium 250 MG tablet Take 4 tablets by mouth.     • spironolactone (ALDACTONE) 25 MG tablet TAKE 2 TABLET BY MOUTH EVERY  tablet 1   • traMADol (ULTRAM) 50 MG tablet Take 1 tablet by mouth Every 8 (Eight) Hours As Needed for Severe Pain . 270 tablet 0   • gabapentin (NEURONTIN) 300 MG capsule TAKE 1 CAPSULE BY MOUTH DAILY AND 2 CAPSULES EVERY NIGHT 270 capsule 0   • LORazepam (ATIVAN) 1 MG tablet TAKE 1 TABLET BY MOUTH EVERY NIGHT. MAY REPEAT 1 TIME AS NEEDED 60 tablet 1   • FLUoxetine (PROzac) 10 MG capsule TAKE 1 CAPSULE BY MOUTH EVERY DAY 90 capsule 1     No facility-administered medications prior to visit.        Review of Systems   Constitutional: Negative.  Negative for chills, fatigue, fever and unexpected weight change.   HENT: Negative.  Negative for congestion, ear pain, hearing loss, nosebleeds, rhinorrhea, sneezing, sore throat and tinnitus.    Eyes: Negative.  Negative for discharge.   Respiratory: Negative.  Negative  "for cough and wheezing.    Cardiovascular: Negative.  Negative for chest pain.   Gastrointestinal: Negative.  Negative for abdominal pain, constipation, diarrhea, nausea and vomiting.   Endocrine: Negative.    Genitourinary: Negative.  Negative for dysuria, frequency and urgency.   Musculoskeletal: Positive for back pain and myalgias. Negative for arthralgias, joint swelling and neck pain.   Skin: Negative.  Negative for rash.   Allergic/Immunologic: Negative.    Neurological: Negative.  Negative for dizziness, weakness, numbness and headaches.   Hematological: Negative.  Negative for adenopathy.   Psychiatric/Behavioral: Negative for dysphoric mood and sleep disturbance. The patient has insomnia.      Objective   Visit Vitals  /78   Pulse 75   Ht 165.1 cm (65\")   Wt 57.1 kg (125 lb 14.4 oz)   SpO2 99%   BMI 20.95 kg/m²     Physical Exam   Constitutional: She is oriented to person, place, and time. She appears well-developed. No distress.   HENT:   Head: Normocephalic and atraumatic.   Nose: Nose normal.   Mouth/Throat: Oropharynx is clear and moist.   Eyes: Pupils are equal, round, and reactive to light. Conjunctivae and EOM are normal. Right eye exhibits no discharge. Left eye exhibits no discharge.   Neck: No thyromegaly present.   Cardiovascular: Normal rate, regular rhythm, normal heart sounds and intact distal pulses.   Pulmonary/Chest: Effort normal and breath sounds normal.   Musculoskeletal:      Comments: Multiple tender points, muscles of posterior upper leg are tender to palpation   Lymphadenopathy:     She has no cervical adenopathy.   Neurological: She is alert and oriented to person, place, and time.   Skin: Skin is warm and dry.   Psychiatric: She has a normal mood and affect.   Nursing note and vitals reviewed.    Notes brought forward are reviewed and updated if indicated.    Assessment/Plan   Problem List Items Addressed This Visit        Cardiovascular and Mediastinum    Essential " hypertension (Chronic)    Relevant Orders    CBC & Differential    Lipid Panel    Urinalysis With Culture If Indicated -    Comprehensive Metabolic Panel       Nervous and Auditory    Primary fibromyalgia syndrome (Chronic)    Relevant Medications    gabapentin (NEURONTIN) 300 MG capsule    LORazepam (ATIVAN) 1 MG tablet       Musculoskeletal and Integument    Degenerative disc disease, lumbar (Chronic)    Relevant Medications    gabapentin (NEURONTIN) 300 MG capsule       Other    Insomnia (Chronic)    Relevant Medications    LORazepam (ATIVAN) 1 MG tablet      Other Visit Diagnoses     Major depressive disorder, recurrent episode, in partial remission (CMS/HCC)    -  Primary    Relevant Medications    escitalopram (LEXAPRO) 5 MG tablet    LORazepam (ATIVAN) 1 MG tablet          Continue current treatment. Switch from fluoxetine to escitalopram [place] reviewed and appropriate. Not recommended to drive or operate heavy equipment while taking potentially sedating meds.  Patient understands the risks associated with this controlled medication, including tolerance and addiction. They also agree to obtain this medication only from me, and not from a another provider, unless that provider is covering for me in my absence. They also agree to be compliant in dosing, and not self adjust the dose of medication.  A signed controlled substance agreement is on file, and they have received a controlled substance education sheet at this or a previous visit. They have also signed a consent for treatment with a controlled substance as per [facility] policy.      New Medications Ordered This Visit   Medications   • escitalopram (LEXAPRO) 5 MG tablet     Sig: Take 1 tablet by mouth Daily.     Dispense:  30 tablet     Refill:  2   • gabapentin (NEURONTIN) 300 MG capsule     Sig: TAKE 1 CAPSULE BY MOUTH DAILY AND 2 CAPSULES EVERY NIGHT     Dispense:  270 capsule     Refill:  0     Not to exceed 5 additional fills before  2020PT REQ REFILLS.   • LORazepam (ATIVAN) 1 MG tablet     Si tab nightly, may repeat x 1 prn     Dispense:  60 tablet     Refill:  2   • triamcinolone acetonide (KENALOG-40) injection 60 mg     Return in about 3 months (around 2020) for Annual physical, medicare wellness visit.

## 2020-09-14 DIAGNOSIS — Z12.31 ENCOUNTER FOR SCREENING MAMMOGRAM FOR MALIGNANT NEOPLASM OF BREAST: ICD-10-CM

## 2020-09-14 DIAGNOSIS — Z78.0 POST-MENOPAUSAL: Primary | ICD-10-CM

## 2020-09-19 VITALS
DIASTOLIC BLOOD PRESSURE: 78 MMHG | OXYGEN SATURATION: 99 % | HEART RATE: 75 BPM | HEIGHT: 65 IN | SYSTOLIC BLOOD PRESSURE: 136 MMHG | WEIGHT: 125.9 LBS | BODY MASS INDEX: 20.98 KG/M2

## 2020-12-10 ENCOUNTER — LAB (OUTPATIENT)
Dept: LAB | Facility: HOSPITAL | Age: 70
End: 2020-12-10

## 2020-12-10 DIAGNOSIS — I10 ESSENTIAL HYPERTENSION: ICD-10-CM

## 2020-12-10 LAB
ALBUMIN SERPL-MCNC: 4.5 G/DL (ref 3.5–5.2)
ALBUMIN/GLOB SERPL: 1.5 G/DL
ALP SERPL-CCNC: 72 U/L (ref 39–117)
ALT SERPL W P-5'-P-CCNC: 17 U/L (ref 1–33)
ANION GAP SERPL CALCULATED.3IONS-SCNC: 11.4 MMOL/L (ref 5–15)
AST SERPL-CCNC: 22 U/L (ref 1–32)
BASOPHILS # BLD AUTO: 0.03 10*3/MM3 (ref 0–0.2)
BASOPHILS NFR BLD AUTO: 0.9 % (ref 0–1.5)
BILIRUB SERPL-MCNC: 0.4 MG/DL (ref 0–1.2)
BILIRUB UR QL STRIP: NEGATIVE
BUN SERPL-MCNC: 13 MG/DL (ref 8–23)
BUN/CREAT SERPL: 15.5 (ref 7–25)
CALCIUM SPEC-SCNC: 9.4 MG/DL (ref 8.6–10.5)
CHLORIDE SERPL-SCNC: 96 MMOL/L (ref 98–107)
CHOLEST SERPL-MCNC: 253 MG/DL (ref 0–200)
CLARITY UR: ABNORMAL
CO2 SERPL-SCNC: 26.6 MMOL/L (ref 22–29)
COLOR UR: YELLOW
CREAT SERPL-MCNC: 0.84 MG/DL (ref 0.57–1)
DEPRECATED RDW RBC AUTO: 41.6 FL (ref 37–54)
EOSINOPHIL # BLD AUTO: 0.07 10*3/MM3 (ref 0–0.4)
EOSINOPHIL NFR BLD AUTO: 2.1 % (ref 0.3–6.2)
ERYTHROCYTE [DISTWIDTH] IN BLOOD BY AUTOMATED COUNT: 12.8 % (ref 12.3–15.4)
GFR SERPL CREATININE-BSD FRML MDRD: 67 ML/MIN/1.73
GLOBULIN UR ELPH-MCNC: 3 GM/DL
GLUCOSE SERPL-MCNC: 80 MG/DL (ref 65–99)
GLUCOSE UR STRIP-MCNC: NEGATIVE MG/DL
HCT VFR BLD AUTO: 37.9 % (ref 34–46.6)
HDLC SERPL-MCNC: 70 MG/DL (ref 40–60)
HGB BLD-MCNC: 12.8 G/DL (ref 12–15.9)
HGB UR QL STRIP.AUTO: NEGATIVE
IMM GRANULOCYTES # BLD AUTO: 0.03 10*3/MM3 (ref 0–0.05)
IMM GRANULOCYTES NFR BLD AUTO: 0.9 % (ref 0–0.5)
KETONES UR QL STRIP: NEGATIVE
LDLC SERPL CALC-MCNC: 172 MG/DL (ref 0–100)
LDLC/HDLC SERPL: 2.43 {RATIO}
LEUKOCYTE ESTERASE UR QL STRIP.AUTO: NEGATIVE
LYMPHOCYTES # BLD AUTO: 0.68 10*3/MM3 (ref 0.7–3.1)
LYMPHOCYTES NFR BLD AUTO: 20.4 % (ref 19.6–45.3)
MCH RBC QN AUTO: 30 PG (ref 26.6–33)
MCHC RBC AUTO-ENTMCNC: 33.8 G/DL (ref 31.5–35.7)
MCV RBC AUTO: 88.8 FL (ref 79–97)
MONOCYTES # BLD AUTO: 0.42 10*3/MM3 (ref 0.1–0.9)
MONOCYTES NFR BLD AUTO: 12.6 % (ref 5–12)
NEUTROPHILS NFR BLD AUTO: 2.11 10*3/MM3 (ref 1.7–7)
NEUTROPHILS NFR BLD AUTO: 63.1 % (ref 42.7–76)
NITRITE UR QL STRIP: NEGATIVE
NRBC BLD AUTO-RTO: 0 /100 WBC (ref 0–0.2)
PH UR STRIP.AUTO: 8.5 [PH] (ref 5–8)
PLATELET # BLD AUTO: 265 10*3/MM3 (ref 140–450)
PMV BLD AUTO: 10.4 FL (ref 6–12)
POTASSIUM SERPL-SCNC: 4.3 MMOL/L (ref 3.5–5.2)
PROT SERPL-MCNC: 7.5 G/DL (ref 6–8.5)
PROT UR QL STRIP: NEGATIVE
RBC # BLD AUTO: 4.27 10*6/MM3 (ref 3.77–5.28)
SODIUM SERPL-SCNC: 134 MMOL/L (ref 136–145)
SP GR UR STRIP: 1.02 (ref 1–1.03)
TRIGL SERPL-MCNC: 65 MG/DL (ref 0–150)
UROBILINOGEN UR QL STRIP: ABNORMAL
VLDLC SERPL-MCNC: 11 MG/DL (ref 5–40)
WBC # BLD AUTO: 3.34 10*3/MM3 (ref 3.4–10.8)

## 2020-12-10 PROCEDURE — 81003 URINALYSIS AUTO W/O SCOPE: CPT

## 2020-12-10 PROCEDURE — 85025 COMPLETE CBC W/AUTO DIFF WBC: CPT

## 2020-12-10 PROCEDURE — 80061 LIPID PANEL: CPT

## 2020-12-10 PROCEDURE — 36415 COLL VENOUS BLD VENIPUNCTURE: CPT

## 2020-12-10 PROCEDURE — 80053 COMPREHEN METABOLIC PANEL: CPT

## 2020-12-17 ENCOUNTER — TELEMEDICINE (OUTPATIENT)
Dept: FAMILY MEDICINE CLINIC | Facility: CLINIC | Age: 70
End: 2020-12-17

## 2020-12-17 VITALS — SYSTOLIC BLOOD PRESSURE: 132 MMHG | DIASTOLIC BLOOD PRESSURE: 84 MMHG

## 2020-12-17 DIAGNOSIS — E78.2 MIXED HYPERLIPIDEMIA: ICD-10-CM

## 2020-12-17 DIAGNOSIS — M79.7 PRIMARY FIBROMYALGIA SYNDROME: Chronic | ICD-10-CM

## 2020-12-17 DIAGNOSIS — Z00.00 MEDICARE ANNUAL WELLNESS VISIT, SUBSEQUENT: Primary | ICD-10-CM

## 2020-12-17 DIAGNOSIS — F33.41 MAJOR DEPRESSIVE DISORDER, RECURRENT EPISODE, IN PARTIAL REMISSION (HCC): ICD-10-CM

## 2020-12-17 DIAGNOSIS — G47.00 INSOMNIA, UNSPECIFIED TYPE: Chronic | ICD-10-CM

## 2020-12-17 DIAGNOSIS — M51.36 DEGENERATIVE DISC DISEASE, LUMBAR: Chronic | ICD-10-CM

## 2020-12-17 DIAGNOSIS — I10 ESSENTIAL HYPERTENSION: Chronic | ICD-10-CM

## 2020-12-17 DIAGNOSIS — F33.42 MAJOR DEPRESSIVE DISORDER, RECURRENT, IN FULL REMISSION (HCC): ICD-10-CM

## 2020-12-17 PROCEDURE — G0439 PPPS, SUBSEQ VISIT: HCPCS | Performed by: GENERAL PRACTICE

## 2020-12-17 PROCEDURE — 99214 OFFICE O/P EST MOD 30 MIN: CPT | Performed by: GENERAL PRACTICE

## 2020-12-17 RX ORDER — GABAPENTIN 300 MG/1
CAPSULE ORAL
Qty: 270 CAPSULE | Refills: 0 | Status: SHIPPED | OUTPATIENT
Start: 2020-12-17 | End: 2021-03-16

## 2020-12-17 RX ORDER — LORAZEPAM 1 MG/1
TABLET ORAL
Qty: 60 TABLET | Refills: 2 | Status: SHIPPED | OUTPATIENT
Start: 2020-12-17 | End: 2021-06-23

## 2020-12-17 RX ORDER — TRAMADOL HYDROCHLORIDE 50 MG/1
50 TABLET ORAL EVERY 8 HOURS PRN
Qty: 270 TABLET | Refills: 0 | Status: SHIPPED | OUTPATIENT
Start: 2020-12-17 | End: 2021-09-07

## 2020-12-17 RX ORDER — CITALOPRAM 10 MG/1
10 TABLET ORAL DAILY
Qty: 90 TABLET | Refills: 3 | Status: SHIPPED | OUTPATIENT
Start: 2020-12-17 | End: 2021-06-25

## 2020-12-21 DIAGNOSIS — Z78.0 POST-MENOPAUSAL: Primary | ICD-10-CM

## 2020-12-21 DIAGNOSIS — Z12.31 ENCOUNTER FOR SCREENING MAMMOGRAM FOR MALIGNANT NEOPLASM OF BREAST: ICD-10-CM

## 2021-02-24 ENCOUNTER — IMMUNIZATION (OUTPATIENT)
Dept: VACCINE CLINIC | Facility: HOSPITAL | Age: 71
End: 2021-02-24

## 2021-02-24 PROCEDURE — 0001A: CPT | Performed by: THORACIC SURGERY (CARDIOTHORACIC VASCULAR SURGERY)

## 2021-02-24 PROCEDURE — 91300 HC SARSCOV02 VAC 30MCG/0.3ML IM: CPT | Performed by: THORACIC SURGERY (CARDIOTHORACIC VASCULAR SURGERY)

## 2021-03-16 DIAGNOSIS — M79.7 PRIMARY FIBROMYALGIA SYNDROME: Chronic | ICD-10-CM

## 2021-03-16 DIAGNOSIS — M51.36 DEGENERATIVE DISC DISEASE, LUMBAR: Chronic | ICD-10-CM

## 2021-03-16 RX ORDER — GABAPENTIN 300 MG/1
CAPSULE ORAL
Qty: 270 CAPSULE | Refills: 0 | Status: SHIPPED | OUTPATIENT
Start: 2021-03-16 | End: 2021-06-23

## 2021-03-16 NOTE — TELEPHONE ENCOUNTER
Last OV    12/17/2020  Telemed    Next Priyanka OV    4/16/2021    Last Script Written  12/17/2020  #270, NR    Review PDMP    Please advise on refill    Thank you

## 2021-03-17 ENCOUNTER — IMMUNIZATION (OUTPATIENT)
Dept: VACCINE CLINIC | Facility: HOSPITAL | Age: 71
End: 2021-03-17

## 2021-03-17 PROCEDURE — 91300 HC SARSCOV02 VAC 30MCG/0.3ML IM: CPT | Performed by: THORACIC SURGERY (CARDIOTHORACIC VASCULAR SURGERY)

## 2021-03-17 PROCEDURE — 0002A: CPT | Performed by: THORACIC SURGERY (CARDIOTHORACIC VASCULAR SURGERY)

## 2021-04-29 ENCOUNTER — OFFICE VISIT (OUTPATIENT)
Dept: FAMILY MEDICINE CLINIC | Facility: CLINIC | Age: 71
End: 2021-04-29

## 2021-04-29 VITALS
HEART RATE: 76 BPM | HEIGHT: 65 IN | DIASTOLIC BLOOD PRESSURE: 68 MMHG | BODY MASS INDEX: 21.83 KG/M2 | OXYGEN SATURATION: 98 % | WEIGHT: 131 LBS | SYSTOLIC BLOOD PRESSURE: 148 MMHG

## 2021-04-29 DIAGNOSIS — Z12.31 ENCOUNTER FOR SCREENING MAMMOGRAM FOR MALIGNANT NEOPLASM OF BREAST: ICD-10-CM

## 2021-04-29 DIAGNOSIS — F33.42 MAJOR DEPRESSIVE DISORDER, RECURRENT, IN FULL REMISSION (HCC): ICD-10-CM

## 2021-04-29 DIAGNOSIS — E78.2 MIXED HYPERLIPIDEMIA: Chronic | ICD-10-CM

## 2021-04-29 DIAGNOSIS — Z78.0 POST-MENOPAUSAL: ICD-10-CM

## 2021-04-29 DIAGNOSIS — Z13.820 ENCOUNTER FOR SCREENING FOR OSTEOPOROSIS: ICD-10-CM

## 2021-04-29 DIAGNOSIS — M79.7 PRIMARY FIBROMYALGIA SYNDROME: Chronic | ICD-10-CM

## 2021-04-29 DIAGNOSIS — Z12.11 SCREENING FOR COLON CANCER: ICD-10-CM

## 2021-04-29 DIAGNOSIS — I10 ESSENTIAL HYPERTENSION: Primary | Chronic | ICD-10-CM

## 2021-04-29 PROCEDURE — 99214 OFFICE O/P EST MOD 30 MIN: CPT | Performed by: GENERAL PRACTICE

## 2021-04-29 NOTE — PROGRESS NOTES
Subjective   Angella Baez is a 70 y.o. female.     Chief Complaint   Patient presents with   • Annual Exam     nae johanna labs   • Hyperlipidemia   • Hypertension   • Fibromyalgia     For review and evaluation of management of chronic medical problems. Records reviewed. Recent labs, xrays reviewed and medications reconciled.  Due for mammogram and bone density. Due for colon cancer screening. Depression stable.    Fibromyalgia  This is a chronic problem. The current episode started more than 1 year ago. The problem occurs constantly. The problem has been unchanged. Associated symptoms include myalgias. Pertinent negatives include no abdominal pain, arthralgias, chest pain, chills, congestion, coughing, fatigue, fever, headaches, joint swelling, nausea, neck pain, numbness, rash, sore throat, vomiting or weakness. The symptoms are aggravated by stress. She has tried NSAIDs and oral narcotics (gabapentin, tramadol) for the symptoms. The treatment provided mild relief.   Back Pain  This is a chronic problem. The current episode started more than 1 year ago. The problem occurs constantly. The problem is unchanged. The pain is present in the lumbar spine and gluteal. The pain radiates to the right foot and left foot. The pain is at a severity of 6/10. The pain is moderate. The pain is worse during the day. The symptoms are aggravated by bending and standing. Stiffness is present in the morning. Pertinent negatives include no abdominal pain, chest pain, dysuria, fever, headaches, numbness or weakness. She has tried analgesics (gabapentin, epidural injection, tramadol) for the symptoms. The treatment provided moderate relief.   Hypertension  This is a chronic problem. The current episode started more than 1 year ago. The problem has been gradually worsening since onset. Pertinent negatives include no chest pain, headaches, neck pain, palpitations or shortness of breath. There are no associated agents to  hypertension. Risk factors for coronary artery disease include stress (pain). Past treatments include diuretics. The current treatment provides moderate improvement. There are no compliance problems.    Hyperlipidemia  This is a chronic problem. The current episode started more than 1 year ago. The problem is uncontrolled. Recent lipid tests were reviewed and are high. There are no known factors aggravating her hyperlipidemia. Associated symptoms include myalgias. Pertinent negatives include no chest pain or shortness of breath. Current antihyperlipidemic treatment includes diet change. The current treatment provides mild improvement of lipids. There are no compliance problems.       The following portions of the patient's history were reviewed and updated as appropriate: allergies, current medications, past family and social history and problem list.    Outpatient Medications Prior to Visit   Medication Sig Dispense Refill   • citalopram (CeleXA) 10 MG tablet Take 1 tablet by mouth Daily. 90 tablet 3   • docusate sodium (COLACE) 100 MG capsule Take 100 mg by mouth every night. Takes 4 cap nightly     • gabapentin (NEURONTIN) 300 MG capsule TAKE 1 CAPSULE BY MOUTH DAILY AND 2 CAPSULES EVERY NIGHT 270 capsule 0   • LORazepam (ATIVAN) 1 MG tablet 1 tab nightly, may repeat x 1 prn 60 tablet 2   • Magnesium 250 MG tablet Take 4 tablets by mouth.     • spironolactone (ALDACTONE) 25 MG tablet TAKE 2 TABLET BY MOUTH EVERY  tablet 1   • traMADol (ULTRAM) 50 MG tablet Take 1 tablet by mouth Every 8 (Eight) Hours As Needed for Severe Pain . 270 tablet 0     No facility-administered medications prior to visit.     I have reviewed 12 systems with patient. Findings were negative except what is noted below and/or in history of present illness.    Review of Systems   Constitutional: Negative for chills, fatigue and fever.   HENT: Negative for congestion and sore throat.    Respiratory: Negative for cough and shortness of  "breath.    Cardiovascular: Negative for chest pain and palpitations.   Gastrointestinal: Negative for abdominal pain, nausea and vomiting.   Genitourinary: Negative for dysuria.   Musculoskeletal: Positive for back pain and myalgias. Negative for arthralgias, joint swelling and neck pain.   Skin: Negative for rash.   Neurological: Negative for weakness, numbness and headaches.     Objective     Visit Vitals  /68   Pulse 76   Ht 165.1 cm (65\")   Wt 59.4 kg (131 lb)   LMP  (LMP Unknown)   SpO2 98%   BMI 21.80 kg/m²     Physical Exam  Vitals and nursing note reviewed.   Constitutional:       General: She is not in acute distress.     Appearance: She is well-developed.   HENT:      Head: Normocephalic and atraumatic.      Nose: Nose normal.   Eyes:      General:         Right eye: No discharge.         Left eye: No discharge.      Conjunctiva/sclera: Conjunctivae normal.      Pupils: Pupils are equal, round, and reactive to light.   Neck:      Thyroid: No thyromegaly.      Trachea: No tracheal deviation.   Cardiovascular:      Rate and Rhythm: Normal rate and regular rhythm.      Heart sounds: Normal heart sounds. No murmur heard.     Pulmonary:      Effort: Pulmonary effort is normal. No respiratory distress.      Breath sounds: Normal breath sounds. No wheezing or rales.   Chest:      Chest wall: No tenderness.      Breasts:         Right: No inverted nipple, mass, nipple discharge, skin change or tenderness.         Left: No inverted nipple, mass, nipple discharge, skin change or tenderness.   Abdominal:      General: Bowel sounds are normal. There is no distension.      Palpations: Abdomen is soft. There is no mass.      Tenderness: There is no abdominal tenderness.      Hernia: No hernia is present.   Musculoskeletal:         General: No deformity. Normal range of motion.        Back:    Lymphadenopathy:      Cervical: No cervical adenopathy.   Skin:     General: Skin is warm and dry.   Neurological:      " Mental Status: She is alert and oriented to person, place, and time.      Deep Tendon Reflexes: Reflexes are normal and symmetric.   Psychiatric:         Behavior: Behavior normal.         Thought Content: Thought content normal.         Judgment: Judgment normal.       Results for orders placed or performed in visit on 12/10/20   Lipid Panel    Specimen: Blood   Result Value Ref Range    Total Cholesterol 253 (H) 0 - 200 mg/dL    Triglycerides 65 0 - 150 mg/dL    HDL Cholesterol 70 (H) 40 - 60 mg/dL    LDL Cholesterol  172 (H) 0 - 100 mg/dL    VLDL Cholesterol 11 5 - 40 mg/dL    LDL/HDL Ratio 2.43    Urinalysis With Culture If Indicated - Urine, Clean Catch    Specimen: Urine, Clean Catch   Result Value Ref Range    Color, UA Yellow Yellow, Straw    Appearance, UA Turbid (A) Clear    pH, UA 8.5 (H) 5.0 - 8.0    Specific Gravity, UA 1.018 1.005 - 1.030    Glucose, UA Negative Negative    Ketones, UA Negative Negative    Bilirubin, UA Negative Negative    Blood, UA Negative Negative    Protein, UA Negative Negative    Leuk Esterase, UA Negative Negative    Nitrite, UA Negative Negative    Urobilinogen, UA 0.2 E.U./dL 0.2 - 1.0 E.U./dL   Comprehensive Metabolic Panel    Specimen: Blood   Result Value Ref Range    Glucose 80 65 - 99 mg/dL    BUN 13 8 - 23 mg/dL    Creatinine 0.84 0.57 - 1.00 mg/dL    Sodium 134 (L) 136 - 145 mmol/L    Potassium 4.3 3.5 - 5.2 mmol/L    Chloride 96 (L) 98 - 107 mmol/L    CO2 26.6 22.0 - 29.0 mmol/L    Calcium 9.4 8.6 - 10.5 mg/dL    Total Protein 7.5 6.0 - 8.5 g/dL    Albumin 4.50 3.50 - 5.20 g/dL    ALT (SGPT) 17 1 - 33 U/L    AST (SGOT) 22 1 - 32 U/L    Alkaline Phosphatase 72 39 - 117 U/L    Total Bilirubin 0.4 0.0 - 1.2 mg/dL    eGFR Non African Amer 67 >60 mL/min/1.73    Globulin 3.0 gm/dL    A/G Ratio 1.5 g/dL    BUN/Creatinine Ratio 15.5 7.0 - 25.0    Anion Gap 11.4 5.0 - 15.0 mmol/L   CBC Auto Differential    Specimen: Blood   Result Value Ref Range    WBC 3.34 (L) 3.40 - 10.80  10*3/mm3    RBC 4.27 3.77 - 5.28 10*6/mm3    Hemoglobin 12.8 12.0 - 15.9 g/dL    Hematocrit 37.9 34.0 - 46.6 %    MCV 88.8 79.0 - 97.0 fL    MCH 30.0 26.6 - 33.0 pg    MCHC 33.8 31.5 - 35.7 g/dL    RDW 12.8 12.3 - 15.4 %    RDW-SD 41.6 37.0 - 54.0 fl    MPV 10.4 6.0 - 12.0 fL    Platelets 265 140 - 450 10*3/mm3    Neutrophil % 63.1 42.7 - 76.0 %    Lymphocyte % 20.4 19.6 - 45.3 %    Monocyte % 12.6 (H) 5.0 - 12.0 %    Eosinophil % 2.1 0.3 - 6.2 %    Basophil % 0.9 0.0 - 1.5 %    Immature Grans % 0.9 (H) 0.0 - 0.5 %    Neutrophils, Absolute 2.11 1.70 - 7.00 10*3/mm3    Lymphocytes, Absolute 0.68 (L) 0.70 - 3.10 10*3/mm3    Monocytes, Absolute 0.42 0.10 - 0.90 10*3/mm3    Eosinophils, Absolute 0.07 0.00 - 0.40 10*3/mm3    Basophils, Absolute 0.03 0.00 - 0.20 10*3/mm3    Immature Grans, Absolute 0.03 0.00 - 0.05 10*3/mm3    nRBC 0.0 0.0 - 0.2 /100 WBC      Notes brought forward are reviewed and updated if indicated.     Assessment/Plan   Problems Addressed this Visit        Cardiac and Vasculature    Essential hypertension - Primary (Chronic)       Mental Health    Major depressive disorder, recurrent, in full remission (CMS/HCC)       Musculoskeletal and Injuries    Primary fibromyalgia syndrome (Chronic)      Other Visit Diagnoses     Screening for colon cancer        Relevant Orders    Cologuard - Stool, Per Rectum    Mixed hyperlipidemia  (Chronic)       Encounter for screening mammogram for malignant neoplasm of breast        Post-menopausal        Encounter for screening for osteoporosis          Diagnoses       Codes Comments    Essential hypertension    -  Primary ICD-10-CM: I10  ICD-9-CM: 401.9     Screening for colon cancer     ICD-10-CM: Z12.11  ICD-9-CM: V76.51     Primary fibromyalgia syndrome     ICD-10-CM: M79.7  ICD-9-CM: 729.1     Mixed hyperlipidemia     ICD-10-CM: E78.2  ICD-9-CM: 272.2     Major depressive disorder, recurrent, in full remission (CMS/[facility])     ICD-10-CM: F33.42  ICD-9-CM: 296.36      Encounter for screening mammogram for malignant neoplasm of breast     ICD-10-CM: Z12.31  ICD-9-CM: V76.12     Post-menopausal     ICD-10-CM: Z78.0  ICD-9-CM: V49.81     Encounter for screening for osteoporosis     ICD-10-CM: Z13.820  ICD-9-CM: V82.81          Will notify regarding results. May need statin. Continue current treatment.     No orders of the defined types were placed in this encounter.    Return in about 4 months (around 8/29/2021) for Recheck.        This document has been electronically signed by Anay Thomson MD on April 29, 2021 18:41 CDT

## 2021-04-29 NOTE — PATIENT INSTRUCTIONS

## 2021-04-30 ENCOUNTER — TELEPHONE (OUTPATIENT)
Dept: FAMILY MEDICINE CLINIC | Facility: CLINIC | Age: 71
End: 2021-04-30

## 2021-05-04 ENCOUNTER — LAB (OUTPATIENT)
Dept: LAB | Facility: HOSPITAL | Age: 71
End: 2021-05-04

## 2021-05-04 ENCOUNTER — CLINICAL SUPPORT (OUTPATIENT)
Dept: FAMILY MEDICINE CLINIC | Facility: CLINIC | Age: 71
End: 2021-05-04

## 2021-05-04 DIAGNOSIS — I10 ESSENTIAL HYPERTENSION: ICD-10-CM

## 2021-05-04 DIAGNOSIS — E78.2 MIXED HYPERLIPIDEMIA: ICD-10-CM

## 2021-05-04 DIAGNOSIS — M53.3 CHRONIC RIGHT SI JOINT PAIN: Primary | ICD-10-CM

## 2021-05-04 DIAGNOSIS — G89.29 CHRONIC RIGHT SI JOINT PAIN: Primary | ICD-10-CM

## 2021-05-04 LAB
ALBUMIN SERPL-MCNC: 4.2 G/DL (ref 3.5–5.2)
ALBUMIN/GLOB SERPL: 1.6 G/DL
ALP SERPL-CCNC: 64 U/L (ref 39–117)
ALT SERPL W P-5'-P-CCNC: 16 U/L (ref 1–33)
ANION GAP SERPL CALCULATED.3IONS-SCNC: 6.8 MMOL/L (ref 5–15)
AST SERPL-CCNC: 18 U/L (ref 1–32)
BASOPHILS # BLD AUTO: 0.02 10*3/MM3 (ref 0–0.2)
BASOPHILS NFR BLD AUTO: 0.6 % (ref 0–1.5)
BILIRUB SERPL-MCNC: 0.4 MG/DL (ref 0–1.2)
BILIRUB UR QL STRIP: NEGATIVE
BUN SERPL-MCNC: 16 MG/DL (ref 8–23)
BUN/CREAT SERPL: 24.2 (ref 7–25)
CALCIUM SPEC-SCNC: 9 MG/DL (ref 8.6–10.5)
CHLORIDE SERPL-SCNC: 98 MMOL/L (ref 98–107)
CHOLEST SERPL-MCNC: 218 MG/DL (ref 0–200)
CLARITY UR: CLEAR
CO2 SERPL-SCNC: 27.2 MMOL/L (ref 22–29)
COLOR UR: YELLOW
CREAT SERPL-MCNC: 0.66 MG/DL (ref 0.57–1)
DEPRECATED RDW RBC AUTO: 40.9 FL (ref 37–54)
EOSINOPHIL # BLD AUTO: 0.11 10*3/MM3 (ref 0–0.4)
EOSINOPHIL NFR BLD AUTO: 3 % (ref 0.3–6.2)
ERYTHROCYTE [DISTWIDTH] IN BLOOD BY AUTOMATED COUNT: 12.7 % (ref 12.3–15.4)
GFR SERPL CREATININE-BSD FRML MDRD: 89 ML/MIN/1.73
GLOBULIN UR ELPH-MCNC: 2.7 GM/DL
GLUCOSE SERPL-MCNC: 88 MG/DL (ref 65–99)
GLUCOSE UR STRIP-MCNC: NEGATIVE MG/DL
HCT VFR BLD AUTO: 36.5 % (ref 34–46.6)
HDLC SERPL-MCNC: 56 MG/DL (ref 40–60)
HGB BLD-MCNC: 12.4 G/DL (ref 12–15.9)
HGB UR QL STRIP.AUTO: NEGATIVE
IMM GRANULOCYTES # BLD AUTO: 0.01 10*3/MM3 (ref 0–0.05)
IMM GRANULOCYTES NFR BLD AUTO: 0.3 % (ref 0–0.5)
KETONES UR QL STRIP: NEGATIVE
LDLC SERPL CALC-MCNC: 148 MG/DL (ref 0–100)
LDLC/HDLC SERPL: 2.6 {RATIO}
LEUKOCYTE ESTERASE UR QL STRIP.AUTO: NEGATIVE
LYMPHOCYTES # BLD AUTO: 0.68 10*3/MM3 (ref 0.7–3.1)
LYMPHOCYTES NFR BLD AUTO: 18.8 % (ref 19.6–45.3)
MCH RBC QN AUTO: 30 PG (ref 26.6–33)
MCHC RBC AUTO-ENTMCNC: 34 G/DL (ref 31.5–35.7)
MCV RBC AUTO: 88.4 FL (ref 79–97)
MONOCYTES # BLD AUTO: 0.38 10*3/MM3 (ref 0.1–0.9)
MONOCYTES NFR BLD AUTO: 10.5 % (ref 5–12)
NEUTROPHILS NFR BLD AUTO: 2.42 10*3/MM3 (ref 1.7–7)
NEUTROPHILS NFR BLD AUTO: 66.8 % (ref 42.7–76)
NITRITE UR QL STRIP: NEGATIVE
NRBC BLD AUTO-RTO: 0 /100 WBC (ref 0–0.2)
PH UR STRIP.AUTO: 6.5 [PH] (ref 5–8)
PLATELET # BLD AUTO: 258 10*3/MM3 (ref 140–450)
PMV BLD AUTO: 10.7 FL (ref 6–12)
POTASSIUM SERPL-SCNC: 4.3 MMOL/L (ref 3.5–5.2)
PROT SERPL-MCNC: 6.9 G/DL (ref 6–8.5)
PROT UR QL STRIP: NEGATIVE
RBC # BLD AUTO: 4.13 10*6/MM3 (ref 3.77–5.28)
SODIUM SERPL-SCNC: 132 MMOL/L (ref 136–145)
SP GR UR STRIP: 1.02 (ref 1–1.03)
TRIGL SERPL-MCNC: 81 MG/DL (ref 0–150)
UROBILINOGEN UR QL STRIP: NORMAL
VLDLC SERPL-MCNC: 14 MG/DL (ref 5–40)
WBC # BLD AUTO: 3.62 10*3/MM3 (ref 3.4–10.8)

## 2021-05-04 PROCEDURE — 81003 URINALYSIS AUTO W/O SCOPE: CPT

## 2021-05-04 PROCEDURE — 80061 LIPID PANEL: CPT

## 2021-05-04 PROCEDURE — 85025 COMPLETE CBC W/AUTO DIFF WBC: CPT

## 2021-05-04 PROCEDURE — 36415 COLL VENOUS BLD VENIPUNCTURE: CPT

## 2021-05-04 PROCEDURE — 80053 COMPREHEN METABOLIC PANEL: CPT

## 2021-05-04 PROCEDURE — 96372 THER/PROPH/DIAG INJ SC/IM: CPT | Performed by: GENERAL PRACTICE

## 2021-05-04 RX ORDER — TRIAMCINOLONE ACETONIDE 40 MG/ML
60 INJECTION, SUSPENSION INTRA-ARTICULAR; INTRAMUSCULAR ONCE
Status: COMPLETED | OUTPATIENT
Start: 2021-05-04 | End: 2021-05-04

## 2021-05-04 RX ADMIN — TRIAMCINOLONE ACETONIDE 60 MG: 40 INJECTION, SUSPENSION INTRA-ARTICULAR; INTRAMUSCULAR at 09:42

## 2021-05-05 ENCOUNTER — TELEPHONE (OUTPATIENT)
Dept: FAMILY MEDICINE CLINIC | Facility: CLINIC | Age: 71
End: 2021-05-05

## 2021-05-05 NOTE — TELEPHONE ENCOUNTER
Per Dr. Thomson, Ms. Baez has been called with recent Left Diagnostic Mammogram results & recommendations.  Continue current medications and follow-up as planned or sooner if any problems.       ----- Message from Anay Thomson MD sent at 5/5/2021  3:42 PM CDT -----  Call and tell repeat mammogram ok, just need to do next year as usual

## 2021-06-22 DIAGNOSIS — M51.36 DEGENERATIVE DISC DISEASE, LUMBAR: Chronic | ICD-10-CM

## 2021-06-22 DIAGNOSIS — M79.7 PRIMARY FIBROMYALGIA SYNDROME: Chronic | ICD-10-CM

## 2021-06-22 DIAGNOSIS — G47.00 INSOMNIA, UNSPECIFIED TYPE: Chronic | ICD-10-CM

## 2021-06-23 RX ORDER — LORAZEPAM 1 MG/1
TABLET ORAL
Qty: 60 TABLET | Refills: 0 | Status: SHIPPED | OUTPATIENT
Start: 2021-06-23 | End: 2021-08-23

## 2021-06-23 RX ORDER — GABAPENTIN 300 MG/1
CAPSULE ORAL
Qty: 270 CAPSULE | Refills: 0 | Status: SHIPPED | OUTPATIENT
Start: 2021-06-23 | End: 2021-09-23

## 2021-06-27 DIAGNOSIS — I10 ESSENTIAL HYPERTENSION: Chronic | ICD-10-CM

## 2021-06-28 RX ORDER — SPIRONOLACTONE 25 MG/1
TABLET ORAL
Qty: 180 TABLET | Refills: 0 | Status: SHIPPED | OUTPATIENT
Start: 2021-06-28 | End: 2021-09-07

## 2021-08-09 PROBLEM — J34.89 STUFFY AND RUNNY NOSE: Status: ACTIVE | Noted: 2021-08-09

## 2021-08-09 PROBLEM — R20.2 PARESTHESIA: Status: ACTIVE | Noted: 2017-11-30

## 2021-08-09 PROBLEM — L30.9 DERMATITIS: Status: ACTIVE | Noted: 2017-11-30

## 2021-08-09 PROBLEM — R05.9 COUGH: Status: ACTIVE | Noted: 2021-08-09

## 2021-08-09 PROBLEM — J98.8 VIRAL RESPIRATORY ILLNESS: Status: ACTIVE | Noted: 2021-08-09

## 2021-08-09 PROBLEM — Z20.822 ENCOUNTER FOR LABORATORY TESTING FOR COVID-19 VIRUS: Status: ACTIVE | Noted: 2021-08-09

## 2021-08-09 PROBLEM — R89.9 ABNORMAL LABORATORY TEST: Status: ACTIVE | Noted: 2017-11-30

## 2021-08-09 PROBLEM — B97.89 VIRAL RESPIRATORY ILLNESS: Status: ACTIVE | Noted: 2021-08-09

## 2021-08-09 PROBLEM — M35.00 SICCA (HCC): Status: ACTIVE | Noted: 2017-11-30

## 2021-08-09 PROCEDURE — 87635 SARS-COV-2 COVID-19 AMP PRB: CPT | Performed by: NURSE PRACTITIONER

## 2021-08-10 ENCOUNTER — TELEPHONE (OUTPATIENT)
Dept: URGENT CARE | Facility: CLINIC | Age: 71
End: 2021-08-10

## 2021-08-23 DIAGNOSIS — G47.00 INSOMNIA, UNSPECIFIED TYPE: Chronic | ICD-10-CM

## 2021-08-23 DIAGNOSIS — M79.7 PRIMARY FIBROMYALGIA SYNDROME: Chronic | ICD-10-CM

## 2021-08-23 RX ORDER — LORAZEPAM 1 MG/1
TABLET ORAL
Qty: 60 TABLET | Refills: 0 | Status: SHIPPED | OUTPATIENT
Start: 2021-08-23 | End: 2021-09-14 | Stop reason: SDUPTHER

## 2021-09-07 DIAGNOSIS — M79.7 PRIMARY FIBROMYALGIA SYNDROME: Chronic | ICD-10-CM

## 2021-09-07 DIAGNOSIS — M51.36 DEGENERATIVE DISC DISEASE, LUMBAR: Chronic | ICD-10-CM

## 2021-09-07 DIAGNOSIS — I10 ESSENTIAL HYPERTENSION: Chronic | ICD-10-CM

## 2021-09-07 RX ORDER — TRAMADOL HYDROCHLORIDE 50 MG/1
50 TABLET ORAL EVERY 8 HOURS PRN
Qty: 270 TABLET | Refills: 0 | Status: SHIPPED | OUTPATIENT
Start: 2021-09-07 | End: 2021-09-14 | Stop reason: SDUPTHER

## 2021-09-07 RX ORDER — SPIRONOLACTONE 25 MG/1
TABLET ORAL
Qty: 180 TABLET | Refills: 0 | Status: SHIPPED | OUTPATIENT
Start: 2021-09-07 | End: 2022-05-03 | Stop reason: SDUPTHER

## 2021-09-10 ENCOUNTER — TELEPHONE (OUTPATIENT)
Dept: FAMILY MEDICINE CLINIC | Facility: CLINIC | Age: 71
End: 2021-09-10

## 2021-09-14 DIAGNOSIS — G47.00 INSOMNIA, UNSPECIFIED TYPE: Chronic | ICD-10-CM

## 2021-09-14 DIAGNOSIS — M79.7 PRIMARY FIBROMYALGIA SYNDROME: Chronic | ICD-10-CM

## 2021-09-14 DIAGNOSIS — M51.36 DEGENERATIVE DISC DISEASE, LUMBAR: Chronic | ICD-10-CM

## 2021-09-14 RX ORDER — TRAMADOL HYDROCHLORIDE 50 MG/1
50 TABLET ORAL EVERY 8 HOURS PRN
Qty: 270 TABLET | Refills: 0 | Status: SHIPPED | OUTPATIENT
Start: 2021-09-14 | End: 2021-10-14 | Stop reason: SDUPTHER

## 2021-09-14 RX ORDER — LORAZEPAM 1 MG/1
TABLET ORAL
Qty: 60 TABLET | Refills: 0 | Status: SHIPPED | OUTPATIENT
Start: 2021-09-14 | End: 2021-10-08

## 2021-09-23 DIAGNOSIS — M79.7 PRIMARY FIBROMYALGIA SYNDROME: Chronic | ICD-10-CM

## 2021-09-23 DIAGNOSIS — M51.36 DEGENERATIVE DISC DISEASE, LUMBAR: Chronic | ICD-10-CM

## 2021-09-23 RX ORDER — GABAPENTIN 300 MG/1
CAPSULE ORAL
Qty: 270 CAPSULE | Refills: 0 | Status: SHIPPED | OUTPATIENT
Start: 2021-09-23 | End: 2021-10-14 | Stop reason: SDUPTHER

## 2021-10-08 DIAGNOSIS — M79.7 PRIMARY FIBROMYALGIA SYNDROME: Chronic | ICD-10-CM

## 2021-10-08 DIAGNOSIS — G47.00 INSOMNIA, UNSPECIFIED TYPE: Chronic | ICD-10-CM

## 2021-10-08 RX ORDER — LORAZEPAM 1 MG/1
TABLET ORAL
Qty: 30 TABLET | Refills: 0 | Status: SHIPPED | OUTPATIENT
Start: 2021-10-08 | End: 2021-10-14 | Stop reason: SDUPTHER

## 2021-10-14 ENCOUNTER — OFFICE VISIT (OUTPATIENT)
Dept: FAMILY MEDICINE CLINIC | Facility: CLINIC | Age: 71
End: 2021-10-14

## 2021-10-14 VITALS
BODY MASS INDEX: 20.09 KG/M2 | OXYGEN SATURATION: 100 % | DIASTOLIC BLOOD PRESSURE: 82 MMHG | HEIGHT: 66 IN | WEIGHT: 125 LBS | HEART RATE: 88 BPM | SYSTOLIC BLOOD PRESSURE: 148 MMHG

## 2021-10-14 DIAGNOSIS — M51.36 DEGENERATIVE DISC DISEASE, LUMBAR: Chronic | ICD-10-CM

## 2021-10-14 DIAGNOSIS — Z23 NEED FOR PROPHYLACTIC VACCINATION AND INOCULATION AGAINST INFLUENZA: ICD-10-CM

## 2021-10-14 DIAGNOSIS — G47.00 INSOMNIA, UNSPECIFIED TYPE: Chronic | ICD-10-CM

## 2021-10-14 DIAGNOSIS — M79.7 PRIMARY FIBROMYALGIA SYNDROME: Primary | Chronic | ICD-10-CM

## 2021-10-14 PROCEDURE — G0008 ADMIN INFLUENZA VIRUS VAC: HCPCS | Performed by: GENERAL PRACTICE

## 2021-10-14 PROCEDURE — 99214 OFFICE O/P EST MOD 30 MIN: CPT | Performed by: GENERAL PRACTICE

## 2021-10-14 PROCEDURE — 90662 IIV NO PRSV INCREASED AG IM: CPT | Performed by: GENERAL PRACTICE

## 2021-10-14 RX ORDER — GABAPENTIN 300 MG/1
300 CAPSULE ORAL 3 TIMES DAILY
Qty: 270 CAPSULE | Refills: 0 | Status: SHIPPED | OUTPATIENT
Start: 2021-10-14 | End: 2021-12-20 | Stop reason: SDUPTHER

## 2021-10-14 RX ORDER — LORAZEPAM 1 MG/1
TABLET ORAL
Qty: 30 TABLET | Refills: 2 | Status: SHIPPED | OUTPATIENT
Start: 2021-10-14 | End: 2021-11-03

## 2021-10-14 RX ORDER — TRAMADOL HYDROCHLORIDE 50 MG/1
50 TABLET ORAL EVERY 8 HOURS PRN
Qty: 270 TABLET | Refills: 0 | Status: SHIPPED | OUTPATIENT
Start: 2021-10-14 | End: 2022-01-27 | Stop reason: SDUPTHER

## 2021-10-14 NOTE — PROGRESS NOTES
Subjective   Angella Baez is a 71 y.o. female.   Chief Complaint   Patient presents with   • Hypertension   • Fibromyalgia     For review and evaluation of management of chronic medical problems. Records reviewed. Recent labs, xrays reviewed and medications reconciled. Blood pressure is up but she thinks this is pain related. Weather makes her fibromyalgia worse.   Hypertension  This is a chronic problem. The current episode started more than 1 year ago. The problem has been gradually worsening since onset. Pertinent negatives include no headaches, neck pain or palpitations. There are no associated agents to hypertension. Risk factors for coronary artery disease include stress (pain). Past treatments include diuretics. The current treatment provides moderate improvement. There are no compliance problems.    Fibromyalgia  This is a chronic problem. The current episode started more than 1 year ago. The problem occurs constantly. The problem has been unchanged. Pertinent negatives include no abdominal pain, arthralgias, chills, congestion, coughing, fatigue, fever, headaches, joint swelling, nausea, neck pain, numbness, rash, sore throat, vomiting or weakness. The symptoms are aggravated by stress. She has tried NSAIDs and oral narcotics (gabapentin, tramadol) for the symptoms. The treatment provided mild relief.   Back Pain  This is a chronic problem. The current episode started more than 1 year ago. The problem occurs constantly. The problem is unchanged. The pain is present in the lumbar spine and gluteal. The pain radiates to the right foot and left foot. The pain is at a severity of 6/10. The pain is moderate. The pain is worse during the day. The symptoms are aggravated by bending and standing. Stiffness is present in the morning. Pertinent negatives include no abdominal pain, dysuria, fever, headaches, numbness or weakness. She has tried analgesics (gabapentin, epidural injection, tramadol) for the  "symptoms. The treatment provided moderate relief.      The following portions of the patient's history were reviewed and updated as appropriate: allergies, current medications, past social history and problem list.    Outpatient Medications Prior to Visit   Medication Sig Dispense Refill   • docusate sodium (COLACE) 100 MG capsule Take 100 mg by mouth every night. Takes 4 cap nightly     • Magnesium 250 MG tablet Take 4 tablets by mouth.     • spironolactone (ALDACTONE) 25 MG tablet TAKE 2 TABLETS BY MOUTH EVERY  tablet 0   • gabapentin (NEURONTIN) 300 MG capsule TAKE 1 CAPSULE BY MOUTH DAILY AND 2 CAPSULES EVERY NIGHT 270 capsule 0   • LORazepam (ATIVAN) 1 MG tablet TAKE 1 TAB BY MOUTH NIGHTLY. MAY REPEAT 1 TAB BY MOUTH AS NEEDED 30 tablet 0   • traMADol (ULTRAM) 50 MG tablet Take 1 tablet by mouth Every 8 (Eight) Hours As Needed for Severe Pain . 270 tablet 0     No facility-administered medications prior to visit.       Review of Systems   Constitutional: Negative for chills, fatigue and fever.   HENT: Negative for congestion and sore throat.    Respiratory: Negative for cough.    Cardiovascular: Negative for palpitations.   Gastrointestinal: Negative for abdominal pain, nausea and vomiting.   Genitourinary: Negative for dysuria.   Musculoskeletal: Positive for back pain. Negative for arthralgias, joint swelling and neck pain.   Skin: Negative for rash.   Neurological: Negative for weakness, numbness and headaches.     I have reviewed 12 systems with patient. Findings were negative except what is noted below and/or in history of present illness.     Objective   Visit Vitals  /82   Pulse 88   Ht 166.4 cm (65.5\")   Wt 56.7 kg (125 lb)   LMP  (LMP Unknown)   SpO2 100%   BMI 20.48 kg/m²     Physical Exam  Vitals and nursing note reviewed.   Constitutional:       General: She is not in acute distress.     Appearance: She is well-developed.   HENT:      Head: Normocephalic and atraumatic.      Nose: Nose " normal.   Eyes:      General:         Right eye: No discharge.         Left eye: No discharge.      Conjunctiva/sclera: Conjunctivae normal.      Pupils: Pupils are equal, round, and reactive to light.   Neck:      Thyroid: No thyromegaly.      Trachea: No tracheal deviation.   Cardiovascular:      Rate and Rhythm: Normal rate and regular rhythm.      Heart sounds: Normal heart sounds. No murmur heard.      Pulmonary:      Effort: Pulmonary effort is normal. No respiratory distress.      Breath sounds: Normal breath sounds. No wheezing or rales.   Abdominal:      General: Bowel sounds are normal. There is no distension.      Palpations: Abdomen is soft. There is no mass.      Tenderness: There is no abdominal tenderness.      Hernia: No hernia is present.   Musculoskeletal:         General: No deformity. Normal range of motion.        Back:    Lymphadenopathy:      Cervical: No cervical adenopathy.   Skin:     General: Skin is warm and dry.   Neurological:      Mental Status: She is alert and oriented to person, place, and time.      Deep Tendon Reflexes: Reflexes are normal and symmetric.   Psychiatric:         Behavior: Behavior normal.         Thought Content: Thought content normal.         Judgment: Judgment normal.         Notes brought forward are reviewed and updated if indicated.     Assessment/Plan   Problems Addressed this Visit        Musculoskeletal and Injuries    Primary fibromyalgia syndrome - Primary (Chronic)    Relevant Medications    gabapentin (NEURONTIN) 300 MG capsule    traMADol (ULTRAM) 50 MG tablet    LORazepam (ATIVAN) 1 MG tablet       Neuro    Degenerative disc disease, lumbar (Chronic)    Relevant Medications    gabapentin (NEURONTIN) 300 MG capsule    traMADol (ULTRAM) 50 MG tablet       Sleep    Insomnia (Chronic)    Relevant Medications    LORazepam (ATIVAN) 1 MG tablet      Other Visit Diagnoses     Need for prophylactic vaccination and inoculation against influenza        Relevant  Orders    Fluzone High-Dose 65+yrs (3214-8141) (Completed)      Diagnoses       Codes Comments    Primary fibromyalgia syndrome    -  Primary ICD-10-CM: M79.7  ICD-9-CM: 729.1     Degenerative disc disease, lumbar     ICD-10-CM: M51.36  ICD-9-CM: 722.52     Insomnia, unspecified type     ICD-10-CM: G47.00  ICD-9-CM: 780.52     Need for prophylactic vaccination and inoculation against influenza     ICD-10-CM: Z23  ICD-9-CM: V04.81           Continue current medications. Monitor blood pressure.    New Medications Ordered This Visit   Medications   • gabapentin (NEURONTIN) 300 MG capsule     Sig: Take 1 capsule by mouth 3 (Three) Times a Day.     Dispense:  270 capsule     Refill:  0     Not to exceed 5 additional fills before 12/20/2021   • traMADol (ULTRAM) 50 MG tablet     Sig: Take 1 tablet by mouth Every 8 (Eight) Hours As Needed for Severe Pain .     Dispense:  270 tablet     Refill:  0     This request is for a new prescription for a controlled substance as required by Federal/State law.   • LORazepam (ATIVAN) 1 MG tablet     Sig: TAKE 1 TAB BY MOUTH NIGHTLY. MAY REPEAT 1 TAB BY MOUTH AS NEEDED     Dispense:  30 tablet     Refill:  2     Not to exceed 5 additional fills before 02/19/2022     Return in about 12 weeks (around 1/6/2022) for Recheck.        This document has been electronically signed by Anay Thomson MD on October 14, 2021 15:34 CDT

## 2021-11-03 DIAGNOSIS — G47.00 INSOMNIA, UNSPECIFIED TYPE: Chronic | ICD-10-CM

## 2021-11-03 DIAGNOSIS — M79.7 PRIMARY FIBROMYALGIA SYNDROME: Chronic | ICD-10-CM

## 2021-11-03 RX ORDER — LORAZEPAM 1 MG/1
TABLET ORAL
Qty: 30 TABLET | Refills: 0 | Status: SHIPPED | OUTPATIENT
Start: 2021-11-03 | End: 2021-11-04 | Stop reason: SDUPTHER

## 2021-11-03 NOTE — TELEPHONE ENCOUNTER
Incoming Refill Request      Medication requested (name and dose)Lorazepam    Pharmacy where request should be sent: [facility]    Additional details provided by patient: Leaving for [place] on Saturday and needs to fill this rx early, but pharmacy won't fill without you calling them and saying it's ok?     Best call back number: [phone]    Does the patient have less than a 3 day supply:  [] Yes  [x] No    Jazmin Wynn  11/03/21, 14:16 CDT

## 2021-11-04 ENCOUNTER — TELEPHONE (OUTPATIENT)
Dept: FAMILY MEDICINE CLINIC | Facility: CLINIC | Age: 71
End: 2021-11-04

## 2021-11-04 DIAGNOSIS — G47.00 INSOMNIA, UNSPECIFIED TYPE: Chronic | ICD-10-CM

## 2021-11-04 DIAGNOSIS — M79.7 PRIMARY FIBROMYALGIA SYNDROME: Chronic | ICD-10-CM

## 2021-11-04 RX ORDER — LORAZEPAM 1 MG/1
TABLET ORAL
Qty: 30 TABLET | Refills: 0 | Status: SHIPPED | OUTPATIENT
Start: 2021-11-04 | End: 2021-11-29 | Stop reason: SDUPTHER

## 2021-11-04 NOTE — TELEPHONE ENCOUNTER
Patient is stating that [facility] is needing a verbal consent from Dr. Thomson that she approves the patient to fill her Lorazepam to be refilled early so she can have it when she goes out of town.    Thanks

## 2021-11-18 ENCOUNTER — TELEPHONE (OUTPATIENT)
Dept: FAMILY MEDICINE CLINIC | Facility: CLINIC | Age: 71
End: 2021-11-18

## 2021-11-29 DIAGNOSIS — M79.7 PRIMARY FIBROMYALGIA SYNDROME: Chronic | ICD-10-CM

## 2021-11-29 DIAGNOSIS — G47.00 INSOMNIA, UNSPECIFIED TYPE: Chronic | ICD-10-CM

## 2021-11-29 NOTE — TELEPHONE ENCOUNTER
Incoming Refill Request      Medication requested (name and dose): LORazepam (ATIVAN) 1 MG tablet    Pharmacy where request should be sent: [facility]    Additional details provided by patient:only 2 pills left  Would like a 90 day supply     Best call back number: [phone]    Does the patient have less than a 3 day supply:  [x] Yes  [] No    Jamel Phan Rep  11/29/21, 11:39 CST

## 2021-11-30 RX ORDER — LORAZEPAM 1 MG/1
TABLET ORAL
Qty: 30 TABLET | Refills: 0 | Status: SHIPPED | OUTPATIENT
Start: 2021-11-30 | End: 2021-12-20 | Stop reason: SDUPTHER

## 2021-12-20 DIAGNOSIS — M79.7 PRIMARY FIBROMYALGIA SYNDROME: Chronic | ICD-10-CM

## 2021-12-20 DIAGNOSIS — M51.36 DEGENERATIVE DISC DISEASE, LUMBAR: Chronic | ICD-10-CM

## 2021-12-20 DIAGNOSIS — G47.00 INSOMNIA, UNSPECIFIED TYPE: Chronic | ICD-10-CM

## 2021-12-20 RX ORDER — GABAPENTIN 300 MG/1
300 CAPSULE ORAL 3 TIMES DAILY
Qty: 270 CAPSULE | Refills: 0 | Status: SHIPPED | OUTPATIENT
Start: 2021-12-20 | End: 2022-01-27 | Stop reason: SDUPTHER

## 2021-12-20 RX ORDER — LORAZEPAM 1 MG/1
TABLET ORAL
Qty: 30 TABLET | Refills: 0 | Status: SHIPPED | OUTPATIENT
Start: 2021-12-20 | End: 2022-01-13 | Stop reason: SDUPTHER

## 2021-12-20 NOTE — TELEPHONE ENCOUNTER
Incoming Refill Request      Medication requested (name and dose): Lorazepam 1mg  Gabapentin 300mg     Pharmacy where request should be sent: [facility]     Additional details provided by patient: patient is requesting a 3 month Lorazepam if possible     Best call back number: [phone]    Does the patient have less than a 3 day supply:  [x] Yes  [] No    Jamel Reyes Rep  12/20/21, 10:22 CST

## 2021-12-20 NOTE — TELEPHONE ENCOUNTER
Last Scripts  Gabapentin 300 mg 10/14/2021  #270, NR    Ativan 1 mg   11/30/2021  #30, NR    Next Priyanka 01/06/2022    Last OV 10/14/2021

## 2022-01-13 DIAGNOSIS — G47.00 INSOMNIA, UNSPECIFIED TYPE: Chronic | ICD-10-CM

## 2022-01-13 DIAGNOSIS — M79.7 PRIMARY FIBROMYALGIA SYNDROME: Chronic | ICD-10-CM

## 2022-01-13 RX ORDER — LORAZEPAM 1 MG/1
TABLET ORAL
Qty: 30 TABLET | Refills: 0 | Status: SHIPPED | OUTPATIENT
Start: 2022-01-13 | End: 2022-01-27 | Stop reason: SDUPTHER

## 2022-01-13 NOTE — TELEPHONE ENCOUNTER
Patient called needing a refill on     LORazepam (ATIVAN) 1 MG tablet    [facility]     Thanks

## 2022-01-13 NOTE — TELEPHONE ENCOUNTER
Last Script 12/20/2021  #30, NR    Next Appt:  01/27/2022, PC 01/06/2022 due to weather    Last OV 10/14/2021

## 2022-01-27 ENCOUNTER — OFFICE VISIT (OUTPATIENT)
Dept: FAMILY MEDICINE CLINIC | Facility: CLINIC | Age: 72
End: 2022-01-27

## 2022-01-27 VITALS
BODY MASS INDEX: 21.33 KG/M2 | WEIGHT: 132.7 LBS | OXYGEN SATURATION: 99 % | DIASTOLIC BLOOD PRESSURE: 80 MMHG | SYSTOLIC BLOOD PRESSURE: 142 MMHG | HEART RATE: 67 BPM | HEIGHT: 66 IN

## 2022-01-27 DIAGNOSIS — G47.00 INSOMNIA, UNSPECIFIED TYPE: Chronic | ICD-10-CM

## 2022-01-27 DIAGNOSIS — F33.42 MAJOR DEPRESSIVE DISORDER, RECURRENT, IN FULL REMISSION: Primary | ICD-10-CM

## 2022-01-27 DIAGNOSIS — I10 ESSENTIAL HYPERTENSION: ICD-10-CM

## 2022-01-27 DIAGNOSIS — M79.7 PRIMARY FIBROMYALGIA SYNDROME: Chronic | ICD-10-CM

## 2022-01-27 DIAGNOSIS — Z00.00 MEDICARE ANNUAL WELLNESS VISIT, SUBSEQUENT: ICD-10-CM

## 2022-01-27 DIAGNOSIS — M51.36 DEGENERATIVE DISC DISEASE, LUMBAR: Chronic | ICD-10-CM

## 2022-01-27 DIAGNOSIS — Z23 NEED FOR PROPHYLACTIC VACCINATION AGAINST STREPTOCOCCUS PNEUMONIAE (PNEUMOCOCCUS): ICD-10-CM

## 2022-01-27 PROCEDURE — 99214 OFFICE O/P EST MOD 30 MIN: CPT | Performed by: GENERAL PRACTICE

## 2022-01-27 PROCEDURE — 90732 PPSV23 VACC 2 YRS+ SUBQ/IM: CPT | Performed by: GENERAL PRACTICE

## 2022-01-27 PROCEDURE — G0009 ADMIN PNEUMOCOCCAL VACCINE: HCPCS | Performed by: GENERAL PRACTICE

## 2022-01-27 RX ORDER — TRAMADOL HYDROCHLORIDE 50 MG/1
50 TABLET ORAL EVERY 8 HOURS PRN
Qty: 270 TABLET | Refills: 0 | Status: SHIPPED | OUTPATIENT
Start: 2022-01-27 | End: 2022-05-03 | Stop reason: SDUPTHER

## 2022-01-27 RX ORDER — GABAPENTIN 300 MG/1
300 CAPSULE ORAL 3 TIMES DAILY
Qty: 270 CAPSULE | Refills: 0 | Status: SHIPPED | OUTPATIENT
Start: 2022-01-27 | End: 2022-03-28 | Stop reason: SDUPTHER

## 2022-01-27 RX ORDER — LORAZEPAM 1 MG/1
TABLET ORAL
Qty: 90 TABLET | Refills: 0 | Status: SHIPPED | OUTPATIENT
Start: 2022-01-27 | End: 2022-02-02 | Stop reason: SDUPTHER

## 2022-01-27 RX ORDER — PAROXETINE 10 MG/1
10 TABLET, FILM COATED ORAL EVERY MORNING
Qty: 90 TABLET | Refills: 0 | Status: SHIPPED | OUTPATIENT
Start: 2022-01-27 | End: 2022-05-03

## 2022-01-27 NOTE — PROGRESS NOTES
Subjective   Angella Baez is a 71 y.o. female.   Chief Complaint   Patient presents with   • Fibromyalgia     12 wk f/u      For review and evaluation of management of chronic medical problems. Records reviewed. Recent labs, xrays reviewed and medications reconciled.  Is having some increased depressive symptoms and would like to start back on paroxetine which has worked well for her in the past.  Hypertension  This is a chronic problem. The current episode started more than 1 year ago. The problem has been gradually worsening since onset. Pertinent negatives include no headaches, neck pain or palpitations. There are no associated agents to hypertension. Risk factors for coronary artery disease include stress (pain). Past treatments include diuretics. The current treatment provides moderate improvement. There are no compliance problems.    Fibromyalgia  This is a chronic problem. The current episode started more than 1 year ago. The problem occurs constantly. The problem has been unchanged. Pertinent negatives include no abdominal pain, arthralgias, chills, congestion, coughing, fatigue, fever, headaches, joint swelling, nausea, neck pain, numbness, rash, sore throat, vomiting or weakness. The symptoms are aggravated by stress. She has tried NSAIDs and oral narcotics (gabapentin, tramadol) for the symptoms. The treatment provided mild relief.   Back Pain  This is a chronic problem. The current episode started more than 1 year ago. The problem occurs constantly. The problem is unchanged. The pain is present in the lumbar spine and gluteal. The pain radiates to the right foot and left foot. The pain is at a severity of 6/10. The pain is moderate. The pain is worse during the day. The symptoms are aggravated by bending and standing. Stiffness is present in the morning. Pertinent negatives include no abdominal pain, dysuria, fever, headaches, numbness or weakness. She has tried analgesics (gabapentin, epidural  "injection, tramadol) for the symptoms. The treatment provided moderate relief.      The following portions of the patient's history were reviewed and updated as appropriate: allergies, current medications, past social history and problem list.    Outpatient Medications Prior to Visit   Medication Sig Dispense Refill   • docusate sodium (COLACE) 100 MG capsule Take 100 mg by mouth every night. Takes 4 cap nightly     • Magnesium 250 MG tablet Take 4 tablets by mouth.     • spironolactone (ALDACTONE) 25 MG tablet TAKE 2 TABLETS BY MOUTH EVERY  tablet 0   • gabapentin (NEURONTIN) 300 MG capsule Take 1 capsule by mouth 3 (Three) Times a Day. 270 capsule 0   • LORazepam (ATIVAN) 1 MG tablet Take nightly, may repeat x1 prn 30 tablet 0   • traMADol (ULTRAM) 50 MG tablet Take 1 tablet by mouth Every 8 (Eight) Hours As Needed for Severe Pain . 270 tablet 0     No facility-administered medications prior to visit.       Review of Systems   Constitutional: Negative for chills, fatigue and fever.   HENT: Negative for congestion and sore throat.    Respiratory: Negative for cough.    Cardiovascular: Negative for palpitations.   Gastrointestinal: Negative for abdominal pain, nausea and vomiting.   Genitourinary: Negative for dysuria.   Musculoskeletal: Positive for back pain. Negative for arthralgias, joint swelling and neck pain.   Skin: Negative for rash.   Neurological: Negative for weakness, numbness and headaches.     I have reviewed 12 systems with patient. Findings were negative except what is noted below and/or in history of present illness.     Objective   Visit Vitals  /80   Pulse 67   Ht 166.4 cm (65.5\")   Wt 60.2 kg (132 lb 11.2 oz)   LMP  (LMP Unknown)   SpO2 99%   BMI 21.75 kg/m²     Physical Exam  Vitals and nursing note reviewed.   Constitutional:       General: She is not in acute distress.     Appearance: She is well-developed.   HENT:      Head: Normocephalic and atraumatic.      Nose: Nose normal. "   Eyes:      General:         Right eye: No discharge.         Left eye: No discharge.      Conjunctiva/sclera: Conjunctivae normal.      Pupils: Pupils are equal, round, and reactive to light.   Neck:      Thyroid: No thyromegaly.      Trachea: No tracheal deviation.   Cardiovascular:      Rate and Rhythm: Normal rate and regular rhythm.      Heart sounds: Normal heart sounds. No murmur heard.      Pulmonary:      Effort: Pulmonary effort is normal. No respiratory distress.      Breath sounds: Normal breath sounds. No wheezing or rales.   Abdominal:      General: Bowel sounds are normal. There is no distension.      Palpations: Abdomen is soft. There is no mass.      Tenderness: There is no abdominal tenderness.      Hernia: No hernia is present.   Musculoskeletal:         General: No deformity. Normal range of motion.        Back:    Lymphadenopathy:      Cervical: No cervical adenopathy.   Skin:     General: Skin is warm and dry.   Neurological:      Mental Status: She is alert and oriented to person, place, and time.      Deep Tendon Reflexes: Reflexes are normal and symmetric.   Psychiatric:         Behavior: Behavior normal.         Thought Content: Thought content normal.         Judgment: Judgment normal.       Notes brought forward are reviewed and updated if indicated.     Assessment/Plan   Problems Addressed this Visit        Cardiac and Vasculature    Essential hypertension (Chronic)    Relevant Orders    CBC & Differential    Comprehensive Metabolic Panel    Lipid Panel    Urinalysis With Culture If Indicated -       Mental Health    Major depressive disorder, recurrent, in full remission (HCC) - Primary    Relevant Medications    LORazepam (ATIVAN) 1 MG tablet    PARoxetine (Paxil) 10 MG tablet       Musculoskeletal and Injuries    Primary fibromyalgia syndrome (Chronic)    Relevant Medications    LORazepam (ATIVAN) 1 MG tablet    gabapentin (NEURONTIN) 300 MG capsule    traMADol (ULTRAM) 50 MG tablet        Neuro    Degenerative disc disease, lumbar (Chronic)    Relevant Medications    gabapentin (NEURONTIN) 300 MG capsule    traMADol (ULTRAM) 50 MG tablet       Sleep    Insomnia (Chronic)    Relevant Medications    LORazepam (ATIVAN) 1 MG tablet      Other Visit Diagnoses     Need for prophylactic vaccination against Streptococcus pneumoniae (pneumococcus)        Relevant Orders    Pneumococcal Polysaccharide Vaccine 23-Valent Greater Than or Equal To 1yo Subcutaneous / IM (Completed)    Medicare annual wellness visit, subsequent        Relevant Orders    CBC & Differential    Comprehensive Metabolic Panel    Lipid Panel    Urinalysis With Culture If Indicated -      Diagnoses       Codes Comments    Major depressive disorder, recurrent, in full remission (HCC)    -  Primary ICD-10-CM: F33.42  ICD-9-CM: 296.36     Primary fibromyalgia syndrome     ICD-10-CM: M79.7  ICD-9-CM: 729.1     Insomnia, unspecified type     ICD-10-CM: G47.00  ICD-9-CM: 780.52     Degenerative disc disease, lumbar     ICD-10-CM: M51.36  ICD-9-CM: 722.52     Need for prophylactic vaccination against Streptococcus pneumoniae (pneumococcus)     ICD-10-CM: Z23  ICD-9-CM: V03.82     Medicare annual wellness visit, subsequent     ICD-10-CM: Z00.00  ICD-9-CM: V70.0     Essential hypertension     ICD-10-CM: I10  ICD-9-CM: 401.9          Start paroxetine 10 mg daily. Instructions given regarding proper use and potential risks and side effects.  Zaire reviewed and appropriate. Not recommended to drive or operate heavy equipment while taking potentially sedating meds.  Patient understands the risks associated with this controlled medication, including tolerance and addiction. They also agree to obtain this medication only from me, and not from a another provider, unless that provider is covering for me in my absence. They also agree to be compliant in dosing, and not self adjust the dose of medication.  A signed controlled substance agreement is on  file, and they have received a controlled substance education sheet at this or a previous visit. They have also signed a consent for treatment with a controlled substance as per [facility] policy.      New Medications Ordered This Visit   Medications   • LORazepam (ATIVAN) 1 MG tablet     Sig: Take nightly, may repeat x1 prn     Dispense:  90 tablet     Refill:  0     May refill today   • gabapentin (NEURONTIN) 300 MG capsule     Sig: Take 1 capsule by mouth 3 (Three) Times a Day.     Dispense:  270 capsule     Refill:  0   • traMADol (ULTRAM) 50 MG tablet     Sig: Take 1 tablet by mouth Every 8 (Eight) Hours As Needed for Severe Pain .     Dispense:  270 tablet     Refill:  0   • PARoxetine (Paxil) 10 MG tablet     Sig: Take 1 tablet by mouth Every Morning.     Dispense:  90 tablet     Refill:  0     Return in about 3 months (around 4/30/2022) for medicare wellness visit.        This document has been electronically signed by Anay Thomson MD on January 27, 2022 17:13 CST

## 2022-02-01 DIAGNOSIS — M79.7 PRIMARY FIBROMYALGIA SYNDROME: Chronic | ICD-10-CM

## 2022-02-01 DIAGNOSIS — G47.00 INSOMNIA, UNSPECIFIED TYPE: Chronic | ICD-10-CM

## 2022-02-01 DIAGNOSIS — Z12.31 ENCOUNTER FOR SCREENING MAMMOGRAM FOR MALIGNANT NEOPLASM OF BREAST: Primary | ICD-10-CM

## 2022-02-01 RX ORDER — LORAZEPAM 1 MG/1
TABLET ORAL
Qty: 30 TABLET | Refills: 0 | OUTPATIENT
Start: 2022-02-01

## 2022-02-02 ENCOUNTER — TELEPHONE (OUTPATIENT)
Dept: FAMILY MEDICINE CLINIC | Facility: CLINIC | Age: 72
End: 2022-02-02

## 2022-02-02 DIAGNOSIS — M79.7 PRIMARY FIBROMYALGIA SYNDROME: Chronic | ICD-10-CM

## 2022-02-02 DIAGNOSIS — G47.00 INSOMNIA, UNSPECIFIED TYPE: Chronic | ICD-10-CM

## 2022-02-02 RX ORDER — LORAZEPAM 1 MG/1
TABLET ORAL
Qty: 90 TABLET | Refills: 0 | Status: SHIPPED | OUTPATIENT
Start: 2022-02-02 | End: 2022-04-05 | Stop reason: SDUPTHER
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
## 2022-03-28 DIAGNOSIS — M51.36 DEGENERATIVE DISC DISEASE, LUMBAR: Chronic | ICD-10-CM

## 2022-03-28 DIAGNOSIS — M79.7 PRIMARY FIBROMYALGIA SYNDROME: Chronic | ICD-10-CM

## 2022-03-28 RX ORDER — GABAPENTIN 300 MG/1
300 CAPSULE ORAL 3 TIMES DAILY
Qty: 270 CAPSULE | Refills: 0 | Status: SHIPPED | OUTPATIENT
Start: 2022-03-28 | End: 2022-06-20

## 2022-03-28 NOTE — TELEPHONE ENCOUNTER
Last Script 01/27/2022  #270, NR    Next Appt:   05/03/2022 - Anay Thomson MD    Last OV 01/27/2022

## 2022-03-28 NOTE — TELEPHONE ENCOUNTER
Incoming Refill Request      Medication requested (name and dose):   gabapentin (NEURONTIN) 300 MG capsule    Pharmacy where request should be sent:   [facility]    Additional details provided by patient: none    Best call back number: [phone]    Does the patient have less than a 3 day supply:  [x] Yes  [] No    Barbara Bella  03/28/22, 09:43 CDT

## 2022-04-05 DIAGNOSIS — G47.00 INSOMNIA, UNSPECIFIED TYPE: Chronic | ICD-10-CM

## 2022-04-05 DIAGNOSIS — M79.7 PRIMARY FIBROMYALGIA SYNDROME: Chronic | ICD-10-CM

## 2022-04-05 RX ORDER — LORAZEPAM 1 MG/1
TABLET ORAL
Qty: 90 TABLET | Refills: 0 | Status: SHIPPED | OUTPATIENT
Start: 2022-04-05 | End: 2022-05-03 | Stop reason: SDUPTHER

## 2022-04-05 NOTE — TELEPHONE ENCOUNTER
Incoming Refill Request      Medication requested (name and dose): LORAZEPAM    Pharmacy where request should be sent: [facility]    Additional details provided by patient:     Best call back number:     Does the patient have less than a 3 day supply:  [] Yes  [] No    Jamel Zamudio Rep  04/05/22, 09:57 CDT

## 2022-04-05 NOTE — TELEPHONE ENCOUNTER
Last Script  02/02/2022  #90, NR    Next Appt  With Family Medicine (Anay Thomson MD)  05/03/2022 at 1:00 PM    Last OV 01/27/2022

## 2022-04-28 ENCOUNTER — LAB (OUTPATIENT)
Dept: LAB | Facility: HOSPITAL | Age: 72
End: 2022-04-28

## 2022-04-28 DIAGNOSIS — I10 ESSENTIAL HYPERTENSION: ICD-10-CM

## 2022-04-28 DIAGNOSIS — Z00.00 MEDICARE ANNUAL WELLNESS VISIT, SUBSEQUENT: ICD-10-CM

## 2022-04-28 PROCEDURE — 80061 LIPID PANEL: CPT

## 2022-04-28 PROCEDURE — 36415 COLL VENOUS BLD VENIPUNCTURE: CPT

## 2022-04-28 PROCEDURE — 85025 COMPLETE CBC W/AUTO DIFF WBC: CPT

## 2022-04-28 PROCEDURE — 81001 URINALYSIS AUTO W/SCOPE: CPT

## 2022-04-28 PROCEDURE — 80053 COMPREHEN METABOLIC PANEL: CPT

## 2022-04-29 LAB
ALBUMIN SERPL-MCNC: 4.1 G/DL (ref 3.5–5.2)
ALBUMIN/GLOB SERPL: 1.4 G/DL
ALP SERPL-CCNC: 67 U/L (ref 39–117)
ALT SERPL W P-5'-P-CCNC: 18 U/L (ref 1–33)
ANION GAP SERPL CALCULATED.3IONS-SCNC: 10.2 MMOL/L (ref 5–15)
AST SERPL-CCNC: 20 U/L (ref 1–32)
BACTERIA UR QL AUTO: NORMAL /HPF
BASOPHILS # BLD AUTO: 0.02 10*3/MM3 (ref 0–0.2)
BASOPHILS NFR BLD AUTO: 0.7 % (ref 0–1.5)
BILIRUB SERPL-MCNC: 0.5 MG/DL (ref 0–1.2)
BILIRUB UR QL STRIP: NEGATIVE
BUN SERPL-MCNC: 15 MG/DL (ref 8–23)
BUN/CREAT SERPL: 18.3 (ref 7–25)
CALCIUM SPEC-SCNC: 9.5 MG/DL (ref 8.6–10.5)
CHLORIDE SERPL-SCNC: 98 MMOL/L (ref 98–107)
CHOLEST SERPL-MCNC: 226 MG/DL (ref 0–200)
CLARITY UR: CLEAR
CO2 SERPL-SCNC: 26.8 MMOL/L (ref 22–29)
COLOR UR: YELLOW
CREAT SERPL-MCNC: 0.82 MG/DL (ref 0.57–1)
DEPRECATED RDW RBC AUTO: 42.8 FL (ref 37–54)
EGFRCR SERPLBLD CKD-EPI 2021: 77.5 ML/MIN/1.73
EOSINOPHIL # BLD AUTO: 0.09 10*3/MM3 (ref 0–0.4)
EOSINOPHIL NFR BLD AUTO: 3.2 % (ref 0.3–6.2)
ERYTHROCYTE [DISTWIDTH] IN BLOOD BY AUTOMATED COUNT: 12.9 % (ref 12.3–15.4)
GLOBULIN UR ELPH-MCNC: 3 GM/DL
GLUCOSE SERPL-MCNC: 90 MG/DL (ref 65–99)
GLUCOSE UR STRIP-MCNC: NEGATIVE MG/DL
HCT VFR BLD AUTO: 37.4 % (ref 34–46.6)
HDLC SERPL-MCNC: 54 MG/DL (ref 40–60)
HGB BLD-MCNC: 12.6 G/DL (ref 12–15.9)
HGB UR QL STRIP.AUTO: NEGATIVE
HYALINE CASTS UR QL AUTO: NORMAL /LPF
IMM GRANULOCYTES # BLD AUTO: 0.01 10*3/MM3 (ref 0–0.05)
IMM GRANULOCYTES NFR BLD AUTO: 0.4 % (ref 0–0.5)
KETONES UR QL STRIP: NEGATIVE
LDLC SERPL CALC-MCNC: 161 MG/DL (ref 0–100)
LDLC/HDLC SERPL: 2.94 {RATIO}
LEUKOCYTE ESTERASE UR QL STRIP.AUTO: ABNORMAL
LYMPHOCYTES # BLD AUTO: 0.55 10*3/MM3 (ref 0.7–3.1)
LYMPHOCYTES NFR BLD AUTO: 19.8 % (ref 19.6–45.3)
MCH RBC QN AUTO: 30.4 PG (ref 26.6–33)
MCHC RBC AUTO-ENTMCNC: 33.7 G/DL (ref 31.5–35.7)
MCV RBC AUTO: 90.3 FL (ref 79–97)
MONOCYTES # BLD AUTO: 0.35 10*3/MM3 (ref 0.1–0.9)
MONOCYTES NFR BLD AUTO: 12.6 % (ref 5–12)
NEUTROPHILS NFR BLD AUTO: 1.76 10*3/MM3 (ref 1.7–7)
NEUTROPHILS NFR BLD AUTO: 63.3 % (ref 42.7–76)
NITRITE UR QL STRIP: NEGATIVE
NRBC BLD AUTO-RTO: 0 /100 WBC (ref 0–0.2)
PH UR STRIP.AUTO: 8 [PH] (ref 5–8)
PLATELET # BLD AUTO: 233 10*3/MM3 (ref 140–450)
PMV BLD AUTO: 10.8 FL (ref 6–12)
POTASSIUM SERPL-SCNC: 4.7 MMOL/L (ref 3.5–5.2)
PROT SERPL-MCNC: 7.1 G/DL (ref 6–8.5)
PROT UR QL STRIP: NEGATIVE
RBC # BLD AUTO: 4.14 10*6/MM3 (ref 3.77–5.28)
RBC # UR STRIP: NORMAL /HPF
REF LAB TEST METHOD: NORMAL
SODIUM SERPL-SCNC: 135 MMOL/L (ref 136–145)
SP GR UR STRIP: 1.02 (ref 1–1.03)
SQUAMOUS #/AREA URNS HPF: NORMAL /HPF
TRIGL SERPL-MCNC: 66 MG/DL (ref 0–150)
UROBILINOGEN UR QL STRIP: ABNORMAL
VLDLC SERPL-MCNC: 11 MG/DL (ref 5–40)
WBC # UR STRIP: NORMAL /HPF
WBC NRBC COR # BLD: 2.78 10*3/MM3 (ref 3.4–10.8)

## 2022-05-03 ENCOUNTER — OFFICE VISIT (OUTPATIENT)
Dept: FAMILY MEDICINE CLINIC | Facility: CLINIC | Age: 72
End: 2022-05-03

## 2022-05-03 VITALS
OXYGEN SATURATION: 96 % | HEART RATE: 73 BPM | DIASTOLIC BLOOD PRESSURE: 90 MMHG | RESPIRATION RATE: 18 BRPM | HEIGHT: 66 IN | WEIGHT: 134.4 LBS | BODY MASS INDEX: 21.6 KG/M2 | SYSTOLIC BLOOD PRESSURE: 162 MMHG

## 2022-05-03 DIAGNOSIS — M79.7 PRIMARY FIBROMYALGIA SYNDROME: Chronic | ICD-10-CM

## 2022-05-03 DIAGNOSIS — G47.00 INSOMNIA, UNSPECIFIED TYPE: Chronic | ICD-10-CM

## 2022-05-03 DIAGNOSIS — D70.8 OTHER NEUTROPENIA: ICD-10-CM

## 2022-05-03 DIAGNOSIS — I10 ESSENTIAL HYPERTENSION: Chronic | ICD-10-CM

## 2022-05-03 DIAGNOSIS — Z12.31 ENCOUNTER FOR SCREENING MAMMOGRAM FOR MALIGNANT NEOPLASM OF BREAST: ICD-10-CM

## 2022-05-03 DIAGNOSIS — M51.36 DEGENERATIVE DISC DISEASE, LUMBAR: Chronic | ICD-10-CM

## 2022-05-03 DIAGNOSIS — Z00.00 MEDICARE ANNUAL WELLNESS VISIT, SUBSEQUENT: Primary | ICD-10-CM

## 2022-05-03 PROCEDURE — 1125F AMNT PAIN NOTED PAIN PRSNT: CPT | Performed by: GENERAL PRACTICE

## 2022-05-03 PROCEDURE — 99214 OFFICE O/P EST MOD 30 MIN: CPT | Performed by: GENERAL PRACTICE

## 2022-05-03 PROCEDURE — 1170F FXNL STATUS ASSESSED: CPT | Performed by: GENERAL PRACTICE

## 2022-05-03 PROCEDURE — G0439 PPPS, SUBSEQ VISIT: HCPCS | Performed by: GENERAL PRACTICE

## 2022-05-03 PROCEDURE — 1159F MED LIST DOCD IN RCRD: CPT | Performed by: GENERAL PRACTICE

## 2022-05-03 RX ORDER — MELOXICAM 15 MG/1
15 TABLET ORAL EVERY OTHER DAY
COMMUNITY
End: 2022-05-03 | Stop reason: SDUPTHER

## 2022-05-03 RX ORDER — SPIRONOLACTONE 25 MG/1
50 TABLET ORAL DAILY
Qty: 180 TABLET | Refills: 1 | Status: SHIPPED | OUTPATIENT
Start: 2022-05-03 | End: 2022-10-31

## 2022-05-03 RX ORDER — TRAMADOL HYDROCHLORIDE 50 MG/1
50 TABLET ORAL EVERY 8 HOURS PRN
Qty: 270 TABLET | Refills: 0 | Status: SHIPPED | OUTPATIENT
Start: 2022-05-03 | End: 2022-08-04 | Stop reason: SDUPTHER

## 2022-05-03 RX ORDER — MELOXICAM 15 MG/1
15 TABLET ORAL DAILY PRN
Qty: 90 TABLET | Refills: 1 | Status: SHIPPED | OUTPATIENT
Start: 2022-05-03 | End: 2022-10-31

## 2022-05-03 RX ORDER — LORAZEPAM 1 MG/1
1.5 TABLET ORAL
Qty: 135 TABLET | Refills: 0 | Status: SHIPPED | OUTPATIENT
Start: 2022-05-03 | End: 2022-08-23 | Stop reason: SDUPTHER

## 2022-05-03 NOTE — PROGRESS NOTES
Subjective   Angella Baez is a 71 y.o. female.     Chief Complaint   Patient presents with   • Medicare Wellness-subsequent     For review and evaluation of management of chronic medical problems. Records reviewed. Recent labs, xrays reviewed and medications reconciled.   Hypertension  This is a chronic problem. The current episode started more than 1 year ago. The problem has been gradually worsening since onset. Pertinent negatives include no headaches, neck pain or palpitations. There are no associated agents to hypertension. Risk factors for coronary artery disease include stress (pain). Past treatments include diuretics. The current treatment provides moderate improvement. There are no compliance problems.    Fibromyalgia  This is a chronic problem. The current episode started more than 1 year ago. The problem occurs constantly. The problem has been unchanged. Pertinent negatives include no abdominal pain, arthralgias, chills, congestion, coughing, fatigue, fever, headaches, joint swelling, nausea, neck pain, numbness, rash, sore throat, vomiting or weakness. The symptoms are aggravated by stress. She has tried NSAIDs and oral narcotics (gabapentin, tramadol) for the symptoms. The treatment provided mild relief.   Back Pain  This is a chronic problem. The current episode started more than 1 year ago. The problem occurs constantly. The problem is unchanged. The pain is present in the lumbar spine and gluteal. The pain radiates to the right foot and left foot. The pain is at a severity of 6/10. The pain is moderate. The pain is worse during the day. The symptoms are aggravated by bending and standing. Stiffness is present in the morning. Pertinent negatives include no abdominal pain, dysuria, fever, headaches, numbness or weakness. She has tried analgesics (gabapentin, epidural injection, tramadol) for the symptoms. The treatment provided moderate relief.      The following portions of the patient's  "history were reviewed and updated as appropriate: allergies, current medications, past family and social history and problem list.    Outpatient Medications Prior to Visit   Medication Sig Dispense Refill   • docusate sodium (COLACE) 100 MG capsule Take 100 mg by mouth Every Night. Takes 4 cap nightly     • gabapentin (NEURONTIN) 300 MG capsule Take 1 capsule by mouth 3 (Three) Times a Day. 270 capsule 0   • Magnesium 250 MG tablet Take 4 tablets by mouth.     • LORazepam (ATIVAN) 1 MG tablet Take nightly, may repeat x1 prn 90 tablet 0   • spironolactone (ALDACTONE) 25 MG tablet TAKE 2 TABLETS BY MOUTH EVERY  tablet 0   • traMADol (ULTRAM) 50 MG tablet Take 1 tablet by mouth Every 8 (Eight) Hours As Needed for Severe Pain . 270 tablet 0   • meloxicam (MOBIC) 15 MG tablet Take 15 mg by mouth Every Other Day.     • PARoxetine (Paxil) 10 MG tablet Take 1 tablet by mouth Every Morning. 90 tablet 0     No facility-administered medications prior to visit.       Review of Systems   Constitutional: Negative for chills, fatigue and fever.   HENT: Negative for congestion and sore throat.    Respiratory: Negative for cough.    Cardiovascular: Negative for palpitations.   Gastrointestinal: Negative for abdominal pain, nausea and vomiting.   Genitourinary: Negative for dysuria.   Musculoskeletal: Positive for back pain. Negative for arthralgias, joint swelling and neck pain.   Skin: Negative for rash.   Neurological: Negative for weakness, numbness and headaches.     I have reviewed 12 systems with patient. Findings were negative except what is noted below and/or in history of present illness.    Objective     Visit Vitals  /90   Pulse 73   Resp 18   Ht 166.4 cm (65.5\")   Wt 61 kg (134 lb 6.4 oz)   LMP  (LMP Unknown)   SpO2 96%   BMI 22.03 kg/m²     Physical Exam  Vitals and nursing note reviewed.   Constitutional:       General: She is not in acute distress.     Appearance: She is well-developed.   HENT:      Head: " Normocephalic and atraumatic.      Nose: Nose normal.   Eyes:      General:         Right eye: No discharge.         Left eye: No discharge.      Conjunctiva/sclera: Conjunctivae normal.      Pupils: Pupils are equal, round, and reactive to light.   Neck:      Thyroid: No thyromegaly.      Trachea: No tracheal deviation.   Cardiovascular:      Rate and Rhythm: Normal rate and regular rhythm.      Heart sounds: Normal heart sounds. No murmur heard.  Pulmonary:      Effort: Pulmonary effort is normal. No respiratory distress.      Breath sounds: Normal breath sounds. No wheezing or rales.   Chest:      Chest wall: No tenderness.   Breasts:      Right: No inverted nipple, mass, nipple discharge, skin change or tenderness.      Left: No inverted nipple, mass, nipple discharge, skin change or tenderness.       Abdominal:      General: Bowel sounds are normal. There is no distension.      Palpations: Abdomen is soft. There is no mass.      Tenderness: There is no abdominal tenderness.      Hernia: No hernia is present.   Musculoskeletal:         General: No deformity. Normal range of motion.        Back:    Lymphadenopathy:      Cervical: No cervical adenopathy.   Skin:     General: Skin is warm and dry.   Neurological:      Mental Status: She is alert and oriented to person, place, and time.      Deep Tendon Reflexes: Reflexes are normal and symmetric.   Psychiatric:         Behavior: Behavior normal.         Thought Content: Thought content normal.         Judgment: Judgment normal.       Results for orders placed or performed in visit on 04/28/22   Comprehensive Metabolic Panel    Specimen: Blood   Result Value Ref Range    Glucose 90 65 - 99 mg/dL    BUN 15 8 - 23 mg/dL    Creatinine 0.82 0.57 - 1.00 mg/dL    Sodium 135 (L) 136 - 145 mmol/L    Potassium 4.7 3.5 - 5.2 mmol/L    Chloride 98 98 - 107 mmol/L    CO2 26.8 22.0 - 29.0 mmol/L    Calcium 9.5 8.6 - 10.5 mg/dL    Total Protein 7.1 6.0 - 8.5 g/dL    Albumin 4.10  3.50 - 5.20 g/dL    ALT (SGPT) 18 1 - 33 U/L    AST (SGOT) 20 1 - 32 U/L    Alkaline Phosphatase 67 39 - 117 U/L    Total Bilirubin 0.5 0.0 - 1.2 mg/dL    Globulin 3.0 gm/dL    A/G Ratio 1.4 g/dL    BUN/Creatinine Ratio 18.3 7.0 - 25.0    Anion Gap 10.2 5.0 - 15.0 mmol/L    eGFR 77.5 >60.0 mL/min/1.73   Lipid Panel    Specimen: Blood   Result Value Ref Range    Total Cholesterol 226 (H) 0 - 200 mg/dL    Triglycerides 66 0 - 150 mg/dL    HDL Cholesterol 54 40 - 60 mg/dL    LDL Cholesterol  161 (H) 0 - 100 mg/dL    VLDL Cholesterol 11 5 - 40 mg/dL    LDL/HDL Ratio 2.94    CBC Auto Differential    Specimen: Blood   Result Value Ref Range    WBC 2.78 (L) 3.40 - 10.80 10*3/mm3    RBC 4.14 3.77 - 5.28 10*6/mm3    Hemoglobin 12.6 12.0 - 15.9 g/dL    Hematocrit 37.4 34.0 - 46.6 %    MCV 90.3 79.0 - 97.0 fL    MCH 30.4 26.6 - 33.0 pg    MCHC 33.7 31.5 - 35.7 g/dL    RDW 12.9 12.3 - 15.4 %    RDW-SD 42.8 37.0 - 54.0 fl    MPV 10.8 6.0 - 12.0 fL    Platelets 233 140 - 450 10*3/mm3    Neutrophil % 63.3 42.7 - 76.0 %    Lymphocyte % 19.8 19.6 - 45.3 %    Monocyte % 12.6 (H) 5.0 - 12.0 %    Eosinophil % 3.2 0.3 - 6.2 %    Basophil % 0.7 0.0 - 1.5 %    Immature Grans % 0.4 0.0 - 0.5 %    Neutrophils, Absolute 1.76 1.70 - 7.00 10*3/mm3    Lymphocytes, Absolute 0.55 (L) 0.70 - 3.10 10*3/mm3    Monocytes, Absolute 0.35 0.10 - 0.90 10*3/mm3    Eosinophils, Absolute 0.09 0.00 - 0.40 10*3/mm3    Basophils, Absolute 0.02 0.00 - 0.20 10*3/mm3    Immature Grans, Absolute 0.01 0.00 - 0.05 10*3/mm3    nRBC 0.0 0.0 - 0.2 /100 WBC   Urinalysis With Culture If Indicated - Urine, Clean Catch    Specimen: Urine, Clean Catch   Result Value Ref Range    Color, UA Yellow Yellow, Straw    Appearance, UA Clear Clear    pH, UA 8.0 5.0 - 8.0    Specific Gravity, UA 1.017 1.005 - 1.030    Glucose, UA Negative Negative    Ketones, UA Negative Negative    Bilirubin, UA Negative Negative    Blood, UA Negative Negative    Protein, UA Negative Negative     Leuk Esterase, UA Small (1+) (A) Negative    Nitrite, UA Negative Negative    Urobilinogen, UA 0.2 E.U./dL 0.2 - 1.0 E.U./dL   Urinalysis, Microscopic Only - Urine, Clean Catch    Specimen: Urine, Clean Catch   Result Value Ref Range    RBC, UA 0-2 None Seen, 0-2 /HPF    WBC, UA 0-2 None Seen, 0-2 /HPF    Bacteria, UA None Seen None Seen /HPF    Squamous Epithelial Cells, UA 0-2 None Seen, 0-2 /HPF    Hyaline Casts, UA None Seen None Seen /LPF    Methodology Automated Microscopy       Notes brought forward are reviewed and updated if indicated.     Assessment/Plan   Problems Addressed this Visit        Cardiac and Vasculature    Essential hypertension (Chronic)    Relevant Medications    spironolactone (ALDACTONE) 25 MG tablet       Musculoskeletal and Injuries    Primary fibromyalgia syndrome (Chronic)    Relevant Medications    LORazepam (ATIVAN) 1 MG tablet    traMADol (ULTRAM) 50 MG tablet       Neuro    Degenerative disc disease, lumbar (Chronic)    Relevant Medications    meloxicam (MOBIC) 15 MG tablet    traMADol (ULTRAM) 50 MG tablet       Sleep    Insomnia (Chronic)    Relevant Medications    LORazepam (ATIVAN) 1 MG tablet      Other Visit Diagnoses     Medicare annual wellness visit, subsequent    -  Primary    Other neutropenia (HCC)        Relevant Medications    meloxicam (MOBIC) 15 MG tablet    Other Relevant Orders    CBC & Differential    Encounter for screening mammogram for malignant neoplasm of breast          Diagnoses       Codes Comments    Medicare annual wellness visit, subsequent    -  Primary ICD-10-CM: Z00.00  ICD-9-CM: V70.0     Primary fibromyalgia syndrome     ICD-10-CM: M79.7  ICD-9-CM: 729.1     Degenerative disc disease, lumbar     ICD-10-CM: M51.36  ICD-9-CM: 722.52     Insomnia, unspecified type     ICD-10-CM: G47.00  ICD-9-CM: 780.52     Essential hypertension     ICD-10-CM: I10  ICD-9-CM: 401.9     Other neutropenia (HCC)     ICD-10-CM: D70.8  ICD-9-CM: 288.09     Encounter for  screening mammogram for malignant neoplasm of breast     ICD-10-CM: Z12.31  ICD-9-CM: V76.12           Will notify regarding results. Will recheck WBC in 3 months. Continue current medications. Instructions given regarding proper use and potential risks and side effects. Advised to recheck if is having any issues with this medication.  Zaire reviewed and appropriate. Not recommended to drive or operate heavy equipment while taking potentially sedating meds.  Patient understands the risks associated with this controlled medication, including tolerance and addiction. They also agree to obtain this medication only from me, and not from a another provider, unless that provider is covering for me in my absence. They also agree to be compliant in dosing, and not self adjust the dose of medication.  A signed controlled substance agreement is on file, and they have received a controlled substance education sheet at this or a previous visit. They have also signed a consent for treatment with a controlled substance as per [facility] policy.      Age-appropriate counseling is provided.     New Medications Ordered This Visit   Medications   • meloxicam (MOBIC) 15 MG tablet     Sig: Take 1 tablet by mouth Daily As Needed for Moderate Pain .     Dispense:  90 tablet     Refill:  1   • LORazepam (ATIVAN) 1 MG tablet     Sig: Take 1.5 tablets by mouth every night at bedtime. Take nightly, may repeat x1 prn     Dispense:  135 tablet     Refill:  0     May refill today   • traMADol (ULTRAM) 50 MG tablet     Sig: Take 1 tablet by mouth Every 8 (Eight) Hours As Needed for Severe Pain .     Dispense:  270 tablet     Refill:  0   • spironolactone (ALDACTONE) 25 MG tablet     Sig: Take 2 tablets by mouth Daily.     Dispense:  180 tablet     Refill:  1     Return in about 3 months (around 8/3/2022) for Recheck.        This document has been electronically signed by Anay Thomson MD on May 3, 2022 13:38 CDT

## 2022-05-03 NOTE — PATIENT INSTRUCTIONS
Medicare Wellness  Personal Prevention Plan of Service     Date of Office Visit:    Encounter Provider:  Anay Thomson MD  Place of Service:  [facility]  Patient Name: Angella Baez  :  1950    As part of the Medicare Wellness portion of your visit today, we are providing you with this personalized preventive plan of services (PPPS). This plan is based upon recommendations of the [place] Preventive Services Task Force (USPSTF) and the Advisory Committee on Immunization Practices (ACIP).    This lists the preventive care services that should be considered, and provides dates of when you are due. Items listed as completed are up-to-date and do not require any further intervention.    Health Maintenance   Topic Date Due    ANNUAL WELLNESS VISIT  2021    INFLUENZA VACCINE  2022    LIPID PANEL  2023    DXA SCAN  2023    TDAP/TD VACCINES (2 - Td or Tdap) 2027    HEPATITIS C SCREENING  Completed    COVID-19 Vaccine  Completed    Pneumococcal Vaccine 65+  Completed    COLORECTAL CANCER SCREENING  Completed    ZOSTER VACCINE  Addressed    PAP SMEAR  Discontinued       No orders of the defined types were placed in this encounter.      No follow-ups on file.

## 2022-05-03 NOTE — PROGRESS NOTES
The ABCs of the Annual Wellness Visit  Subsequent Medicare Wellness Visit    Chief Complaint   Patient presents with   • Medicare Wellness-subsequent      Subjective    History of Present Illness:  Angella Baez is a 71 y.o. female who presents for a Subsequent Medicare Wellness Visit. Due for mammogram.      The following portions of the patient's history were reviewed and   updated as appropriate: allergies, current medications, past family history, past medical history, past social history, past surgical history and problem list.    Compared to one year ago, the patient feels her physical   health is the same.    Compared to one year ago, the patient feels her mental   health is the same.    Recent Hospitalizations:  She was not admitted to the hospital during the last year.       Current Medical Providers:  Patient Care Team:  Anay Thomson MD as PCP - General (Family Medicine)  Marty Purcell MD as Consulting Physician (Dermatology)  Henry Estrada DO as Consulting Physician (Gastroenterology)    Outpatient Medications Prior to Visit   Medication Sig Dispense Refill   • docusate sodium (COLACE) 100 MG capsule Take 100 mg by mouth Every Night. Takes 4 cap nightly     • gabapentin (NEURONTIN) 300 MG capsule Take 1 capsule by mouth 3 (Three) Times a Day. 270 capsule 0   • LORazepam (ATIVAN) 1 MG tablet Take nightly, may repeat x1 prn 90 tablet 0   • Magnesium 250 MG tablet Take 4 tablets by mouth.     • spironolactone (ALDACTONE) 25 MG tablet TAKE 2 TABLETS BY MOUTH EVERY  tablet 0   • traMADol (ULTRAM) 50 MG tablet Take 1 tablet by mouth Every 8 (Eight) Hours As Needed for Severe Pain . 270 tablet 0   • meloxicam (MOBIC) 15 MG tablet Take 15 mg by mouth Every Other Day.     • PARoxetine (Paxil) 10 MG tablet Take 1 tablet by mouth Every Morning. 90 tablet 0     No facility-administered medications prior to visit.       Opioid medication/s are on active medication list.  and I have  "evaluated her active treatment plan and pain score trends (see table).  Vitals:    05/03/22 1301   PainSc:   4   PainLoc: Generalized     I have reviewed the chart for potential of high risk medication and harmful drug interactions in the elderly.            Aspirin is not on active medication list.  Aspirin use is not indicated based on review of current medical condition/s. Risk of harm outweighs potential benefits.  .    Patient Active Problem List   Diagnosis   • Depressive disorder   • Essential hypertension   • Primary fibromyalgia syndrome   • Insomnia   • Acute midline low back pain with right-sided sciatica   • Degenerative disc disease, lumbar   • Osteopenia   • Pain in both thighs   • Chronic right SI joint pain   • Major depressive disorder, recurrent, in full remission (HCC)   • Stress incontinence   • Sicca (HCC)   • Paresthesia   • Dermatitis   • Abnormal laboratory test   • Viral respiratory illness   • Cough   • Stuffy and runny nose   • Encounter for laboratory testing for COVID-19 virus     Advance Care Planning  Advance Directive is not on file.  ACP discussion was held with the patient during this visit. Patient does not have an advance directive, information provided.          Objective    Vitals:    05/03/22 1301   Weight: 61 kg (134 lb 6.4 oz)   Height: 166.4 cm (65.5\")   PainSc:   4   PainLoc: Generalized     BMI Readings from Last 1 Encounters:   05/03/22 22.03 kg/m²   BMI is within normal parameters. No follow-up required.    Does the patient have evidence of cognitive impairment? No    Physical Exam  Lab Results   Component Value Date    TRIG 66 04/28/2022    HDL 54 04/28/2022     (H) 04/28/2022    VLDL 11 04/28/2022         Results for orders placed or performed in visit on 04/28/22   Comprehensive Metabolic Panel    Specimen: Blood   Result Value Ref Range    Glucose 90 65 - 99 mg/dL    BUN 15 8 - 23 mg/dL    Creatinine 0.82 0.57 - 1.00 mg/dL    Sodium 135 (L) 136 - 145 mmol/L    " Potassium 4.7 3.5 - 5.2 mmol/L    Chloride 98 98 - 107 mmol/L    CO2 26.8 22.0 - 29.0 mmol/L    Calcium 9.5 8.6 - 10.5 mg/dL    Total Protein 7.1 6.0 - 8.5 g/dL    Albumin 4.10 3.50 - 5.20 g/dL    ALT (SGPT) 18 1 - 33 U/L    AST (SGOT) 20 1 - 32 U/L    Alkaline Phosphatase 67 39 - 117 U/L    Total Bilirubin 0.5 0.0 - 1.2 mg/dL    Globulin 3.0 gm/dL    A/G Ratio 1.4 g/dL    BUN/Creatinine Ratio 18.3 7.0 - 25.0    Anion Gap 10.2 5.0 - 15.0 mmol/L    eGFR 77.5 >60.0 mL/min/1.73   Lipid Panel    Specimen: Blood   Result Value Ref Range    Total Cholesterol 226 (H) 0 - 200 mg/dL    Triglycerides 66 0 - 150 mg/dL    HDL Cholesterol 54 40 - 60 mg/dL    LDL Cholesterol  161 (H) 0 - 100 mg/dL    VLDL Cholesterol 11 5 - 40 mg/dL    LDL/HDL Ratio 2.94    CBC Auto Differential    Specimen: Blood   Result Value Ref Range    WBC 2.78 (L) 3.40 - 10.80 10*3/mm3    RBC 4.14 3.77 - 5.28 10*6/mm3    Hemoglobin 12.6 12.0 - 15.9 g/dL    Hematocrit 37.4 34.0 - 46.6 %    MCV 90.3 79.0 - 97.0 fL    MCH 30.4 26.6 - 33.0 pg    MCHC 33.7 31.5 - 35.7 g/dL    RDW 12.9 12.3 - 15.4 %    RDW-SD 42.8 37.0 - 54.0 fl    MPV 10.8 6.0 - 12.0 fL    Platelets 233 140 - 450 10*3/mm3    Neutrophil % 63.3 42.7 - 76.0 %    Lymphocyte % 19.8 19.6 - 45.3 %    Monocyte % 12.6 (H) 5.0 - 12.0 %    Eosinophil % 3.2 0.3 - 6.2 %    Basophil % 0.7 0.0 - 1.5 %    Immature Grans % 0.4 0.0 - 0.5 %    Neutrophils, Absolute 1.76 1.70 - 7.00 10*3/mm3    Lymphocytes, Absolute 0.55 (L) 0.70 - 3.10 10*3/mm3    Monocytes, Absolute 0.35 0.10 - 0.90 10*3/mm3    Eosinophils, Absolute 0.09 0.00 - 0.40 10*3/mm3    Basophils, Absolute 0.02 0.00 - 0.20 10*3/mm3    Immature Grans, Absolute 0.01 0.00 - 0.05 10*3/mm3    nRBC 0.0 0.0 - 0.2 /100 WBC   Urinalysis With Culture If Indicated - Urine, Clean Catch    Specimen: Urine, Clean Catch   Result Value Ref Range    Color, UA Yellow Yellow, Straw    Appearance, UA Clear Clear    pH, UA 8.0 5.0 - 8.0    Specific Gravity, UA 1.017 1.005 -  1.030    Glucose, UA Negative Negative    Ketones, UA Negative Negative    Bilirubin, UA Negative Negative    Blood, UA Negative Negative    Protein, UA Negative Negative    Leuk Esterase, UA Small (1+) (A) Negative    Nitrite, UA Negative Negative    Urobilinogen, UA 0.2 E.U./dL 0.2 - 1.0 E.U./dL   Urinalysis, Microscopic Only - Urine, Clean Catch    Specimen: Urine, Clean Catch   Result Value Ref Range    RBC, UA 0-2 None Seen, 0-2 /HPF    WBC, UA 0-2 None Seen, 0-2 /HPF    Bacteria, UA None Seen None Seen /HPF    Squamous Epithelial Cells, UA 0-2 None Seen, 0-2 /HPF    Hyaline Casts, UA None Seen None Seen /LPF    Methodology Automated Microscopy           HEALTH RISK ASSESSMENT    Smoking Status:  Social History     Tobacco Use   Smoking Status Never Smoker   Smokeless Tobacco Never Used     Alcohol Consumption:  Social History     Substance and Sexual Activity   Alcohol Use No     Fall Risk Screen:    STEADI Fall Risk Assessment was completed, and patient is at LOW risk for falls.Assessment completed on:5/3/2022    Depression Screening:  PHQ-2/PHQ-9 Depression Screening 5/3/2022   Retired PHQ-9 Total Score -   Retired Total Score -   Little Interest or Pleasure in Doing Things 1-->several days   Feeling Down, Depressed or Hopeless 0-->not at all   Trouble Falling or Staying Asleep, or Sleeping Too Much 0-->not at all   Feeling Tired or Having Little Energy 1-->several days   Poor Appetite or Overeating 0-->not at all   Feeling Bad about Yourself - or that You are a Failure or Have Let Yourself or Your Family Down 0-->not at all   Trouble Concentrating on Things, Such as Reading the Newspaper or Watching Television 0-->not at all   Moving or Speaking So Slowly that Other People Could Have Noticed? Or the Opposite - Being So Fidgety 0-->not at all   Thoughts that You Would be Better Off Dead or of Hurting Yourself in Some Way 0-->not at all   PHQ-9: Brief Depression Severity Measure Score 2   If You Checked Off  Any Problems, How Difficult Have These Problems Made It For You to Do Your Work, Take Care of Things at Home, or Get Along with Other People? not difficult at all       Health Habits and Functional and Cognitive Screening:  Functional & Cognitive Status 5/3/2022   Do you have difficulty preparing food and eating? No   Do you have difficulty bathing yourself, getting dressed or grooming yourself? No   Do you have difficulty using the toilet? No   Do you have difficulty moving around from place to place? No   Do you have trouble with steps or getting out of a bed or a chair? No   Current Diet Well Balanced Diet   Dental Exam Up to date   Eye Exam Up to date   Exercise (times per week) 3 times per week   Current Exercises Include Swimming   Current Exercise Activities Include -   Do you need help using the phone?  No   Are you deaf or do you have serious difficulty hearing?  No   Do you need help with transportation? No   Do you need help shopping? No   Do you need help preparing meals?  No   Do you need help with housework?  No   Do you need help with laundry? No   Do you need help taking your medications? No   Do you need help managing money? No   Do you ever drive or ride in a car without wearing a seat belt? No   Have you felt unusual stress, anger or loneliness in the last month? No   Who do you live with? Spouse   If you need help, do you have trouble finding someone available to you? No   Have you been bothered in the last four weeks by sexual problems? No   Do you have difficulty concentrating, remembering or making decisions? No       Age-appropriate Screening Schedule:  Refer to the list below for future screening recommendations based on patient's age, sex and/or medical conditions. Orders for these recommended tests are listed in the plan section. The patient has been provided with a written plan.    Health Maintenance   Topic Date Due   • INFLUENZA VACCINE  08/01/2022   • LIPID PANEL  04/28/2023   • DXA  SCAN  04/29/2023   • TDAP/TD VACCINES (2 - Td or Tdap) 11/05/2027   • ZOSTER VACCINE  Addressed   • PAP SMEAR  Discontinued              Assessment/Plan   CMS Preventative Services Quick Reference  Risk Factors Identified During Encounter  Immunizations Discussed/Encouraged (specific Immunizations; COVID19  The above risks/problems have been discussed with the patient.  Follow up actions/plans if indicated are seen below in the Assessment/Plan Section.  Pertinent information has been shared with the patient in the After Visit Summary.    Diagnoses and all orders for this visit:    1. Medicare annual wellness visit, subsequent (Primary)        Follow Up:   No follow-ups on file.     An After Visit Summary and PPPS were made available to the patient.  Information has been scanned into chart. Discussed importance of taking medications as prescribed. Encouraged healthy eating habits with low fat, low salt choices and working towards maintaining a healthy weight. Recommended regular exercise if able as well as care to prevent falls including avoiding anything on the floor that they could slip or trip on such as throw rugs, making sure they have a bathmat to step onto when their feet are wet and having grab bars and railings where needed.

## 2022-05-24 ENCOUNTER — TELEPHONE (OUTPATIENT)
Dept: FAMILY MEDICINE CLINIC | Facility: CLINIC | Age: 72
End: 2022-05-24

## 2022-05-24 NOTE — TELEPHONE ENCOUNTER
I have talked to Gui the Pharmacist at [facility] and he does have the new script from 05/03/2022  I did explain to the pharmacy that the new script was sent with additional instructions that the dose may repeat x1 if needed.    I have left a message on the patient's VM that the pharmacy will have the script ready for  before she leaves for vacation

## 2022-05-24 NOTE — TELEPHONE ENCOUNTER
PHARMACY ONLY FILLED PT'S LORAZEPAM FOR A 30 DAY SUPPLY INSTEAD OF 90 AND TOLD THE PT DR DAVIS WOULD HAVE TO CALL THE PHARMACY IF IT NEEDED TO BE FILLED FOR MORE

## 2022-06-18 DIAGNOSIS — M51.36 DEGENERATIVE DISC DISEASE, LUMBAR: Chronic | ICD-10-CM

## 2022-06-18 DIAGNOSIS — M79.7 PRIMARY FIBROMYALGIA SYNDROME: Chronic | ICD-10-CM

## 2022-06-20 RX ORDER — GABAPENTIN 300 MG/1
CAPSULE ORAL
Qty: 270 CAPSULE | Refills: 0 | Status: SHIPPED | OUTPATIENT
Start: 2022-06-20 | End: 2022-09-20 | Stop reason: SDUPTHER

## 2022-06-21 DIAGNOSIS — M51.36 DEGENERATIVE DISC DISEASE, LUMBAR: Chronic | ICD-10-CM

## 2022-06-21 DIAGNOSIS — M79.7 PRIMARY FIBROMYALGIA SYNDROME: Chronic | ICD-10-CM

## 2022-06-21 RX ORDER — GABAPENTIN 300 MG/1
300 CAPSULE ORAL 3 TIMES DAILY
Qty: 270 CAPSULE | Refills: 0 | OUTPATIENT
Start: 2022-06-21

## 2022-06-21 NOTE — TELEPHONE ENCOUNTER
Incoming Refill Request      Medication requested (name and dose):   gabapentin (NEURONTIN) 300 MG capsule    Pharmacy where request should be sent:   [facility] [place]    Additional details provided by patient: NONE    Best call back number: [phone]    Does the patient have less than a 3 day supply:  [x] Yes  [] No    Jamel Muñoz Rep  06/21/22, 10:10 CDT

## 2022-08-02 ENCOUNTER — LAB (OUTPATIENT)
Dept: LAB | Facility: HOSPITAL | Age: 72
End: 2022-08-02

## 2022-08-02 DIAGNOSIS — D70.8 OTHER NEUTROPENIA: ICD-10-CM

## 2022-08-02 LAB
BASOPHILS # BLD AUTO: 0.03 10*3/MM3 (ref 0–0.2)
BASOPHILS NFR BLD AUTO: 0.7 % (ref 0–1.5)
DEPRECATED RDW RBC AUTO: 40.8 FL (ref 37–54)
EOSINOPHIL # BLD AUTO: 0.27 10*3/MM3 (ref 0–0.4)
EOSINOPHIL NFR BLD AUTO: 6 % (ref 0.3–6.2)
ERYTHROCYTE [DISTWIDTH] IN BLOOD BY AUTOMATED COUNT: 12.5 % (ref 12.3–15.4)
HCT VFR BLD AUTO: 36 % (ref 34–46.6)
HGB BLD-MCNC: 12.3 G/DL (ref 12–15.9)
IMM GRANULOCYTES # BLD AUTO: 0.02 10*3/MM3 (ref 0–0.05)
IMM GRANULOCYTES NFR BLD AUTO: 0.4 % (ref 0–0.5)
LYMPHOCYTES # BLD AUTO: 0.88 10*3/MM3 (ref 0.7–3.1)
LYMPHOCYTES NFR BLD AUTO: 19.6 % (ref 19.6–45.3)
MCH RBC QN AUTO: 30.3 PG (ref 26.6–33)
MCHC RBC AUTO-ENTMCNC: 34.2 G/DL (ref 31.5–35.7)
MCV RBC AUTO: 88.7 FL (ref 79–97)
MONOCYTES # BLD AUTO: 0.45 10*3/MM3 (ref 0.1–0.9)
MONOCYTES NFR BLD AUTO: 10 % (ref 5–12)
NEUTROPHILS NFR BLD AUTO: 2.85 10*3/MM3 (ref 1.7–7)
NEUTROPHILS NFR BLD AUTO: 63.3 % (ref 42.7–76)
NRBC BLD AUTO-RTO: 0 /100 WBC (ref 0–0.2)
PLATELET # BLD AUTO: 257 10*3/MM3 (ref 140–450)
PMV BLD AUTO: 9.6 FL (ref 6–12)
RBC # BLD AUTO: 4.06 10*6/MM3 (ref 3.77–5.28)
WBC NRBC COR # BLD: 4.5 10*3/MM3 (ref 3.4–10.8)

## 2022-08-02 PROCEDURE — 85025 COMPLETE CBC W/AUTO DIFF WBC: CPT

## 2022-08-02 PROCEDURE — 36415 COLL VENOUS BLD VENIPUNCTURE: CPT

## 2022-08-04 ENCOUNTER — OFFICE VISIT (OUTPATIENT)
Dept: FAMILY MEDICINE CLINIC | Facility: CLINIC | Age: 72
End: 2022-08-04

## 2022-08-04 VITALS
BODY MASS INDEX: 20.84 KG/M2 | HEIGHT: 66 IN | HEART RATE: 80 BPM | DIASTOLIC BLOOD PRESSURE: 90 MMHG | OXYGEN SATURATION: 99 % | SYSTOLIC BLOOD PRESSURE: 150 MMHG | WEIGHT: 129.7 LBS

## 2022-08-04 DIAGNOSIS — M51.36 DEGENERATIVE DISC DISEASE, LUMBAR: Chronic | ICD-10-CM

## 2022-08-04 DIAGNOSIS — M79.7 PRIMARY FIBROMYALGIA SYNDROME: Primary | Chronic | ICD-10-CM

## 2022-08-04 DIAGNOSIS — F33.41 MAJOR DEPRESSIVE DISORDER, RECURRENT EPISODE, IN PARTIAL REMISSION: ICD-10-CM

## 2022-08-04 DIAGNOSIS — F33.42 MAJOR DEPRESSIVE DISORDER, RECURRENT, IN FULL REMISSION: ICD-10-CM

## 2022-08-04 PROCEDURE — 99213 OFFICE O/P EST LOW 20 MIN: CPT | Performed by: GENERAL PRACTICE

## 2022-08-04 RX ORDER — BUPROPION HYDROCHLORIDE 100 MG/1
100 TABLET, EXTENDED RELEASE ORAL EVERY MORNING
Qty: 30 TABLET | Refills: 2 | OUTPATIENT
Start: 2022-08-04 | End: 2022-08-10

## 2022-08-04 RX ORDER — TRAMADOL HYDROCHLORIDE 50 MG/1
50 TABLET ORAL EVERY 8 HOURS PRN
Qty: 270 TABLET | Refills: 0 | Status: SHIPPED | OUTPATIENT
Start: 2022-08-04 | End: 2022-11-08 | Stop reason: SDUPTHER

## 2022-08-04 NOTE — PROGRESS NOTES
Subjective   Angella Baez is a 72 y.o. female.   Chief Complaint   Patient presents with   • Fibromyalgia   • Med Refill     For review and evaluation of management of chronic medical problems. Records reviewed. Recent labs, xrays reviewed and medications reconciled. Blood pressure is well controlled at home. Stopped taking paroxetine as depression controlled.  Hypertension  This is a chronic problem. The current episode started more than 1 year ago. The problem has been gradually worsening since onset. Pertinent negatives include no headaches, neck pain or palpitations. There are no associated agents to hypertension. Risk factors for coronary artery disease include stress (pain). Past treatments include diuretics. The current treatment provides moderate improvement. There are no compliance problems.    Fibromyalgia  This is a chronic problem. The current episode started more than 1 year ago. The problem occurs constantly. The problem has been unchanged. Pertinent negatives include no abdominal pain, arthralgias, chills, congestion, coughing, fatigue, fever, headaches, joint swelling, nausea, neck pain, numbness, rash, sore throat, vomiting or weakness. The symptoms are aggravated by stress. She has tried NSAIDs and oral narcotics (gabapentin, tramadol) for the symptoms. The treatment provided mild relief.   Back Pain  This is a chronic problem. The current episode started more than 1 year ago. The problem occurs constantly. The problem is unchanged. The pain is present in the lumbar spine and gluteal. The pain radiates to the right foot and left foot. The pain is at a severity of 4/10. The pain is moderate. The pain is worse during the day. The symptoms are aggravated by bending and standing. Stiffness is present in the morning. Pertinent negatives include no abdominal pain, dysuria, fever, headaches, numbness or weakness. She has tried analgesics (gabapentin, epidural injection, tramadol) for the  "symptoms. The treatment provided moderate relief.      The following portions of the patient's history were reviewed and updated as appropriate: allergies, current medications, past social history and problem list.    Outpatient Medications Prior to Visit   Medication Sig Dispense Refill   • docusate sodium (COLACE) 100 MG capsule Take 100 mg by mouth Every Night. Takes 4 cap nightly     • gabapentin (NEURONTIN) 300 MG capsule TAKE 1 CAPSULE BY MOUTH THREE TIMES A  capsule 0   • LORazepam (ATIVAN) 1 MG tablet Take 1.5 tablets by mouth every night at bedtime. Take nightly, may repeat x1 prn 135 tablet 0   • Magnesium 250 MG tablet Take 1 tablet by mouth.     • meloxicam (MOBIC) 15 MG tablet Take 1 tablet by mouth Daily As Needed for Moderate Pain . 90 tablet 1   • spironolactone (ALDACTONE) 25 MG tablet Take 2 tablets by mouth Daily. 180 tablet 1   • traMADol (ULTRAM) 50 MG tablet Take 1 tablet by mouth Every 8 (Eight) Hours As Needed for Severe Pain . 270 tablet 0     No facility-administered medications prior to visit.       Review of Systems   Constitutional: Negative for chills, fatigue and fever.   HENT: Negative for congestion and sore throat.    Respiratory: Negative for cough.    Cardiovascular: Negative for palpitations.   Gastrointestinal: Negative for abdominal pain, nausea and vomiting.   Genitourinary: Negative for dysuria.   Musculoskeletal: Positive for back pain. Negative for arthralgias, joint swelling and neck pain.   Skin: Negative for rash.   Neurological: Negative for weakness, numbness and headaches.     I have reviewed 12 systems with patient. Findings were negative except what is noted below and/or in history of present illness.     Objective   Visit Vitals  /90   Pulse 80   Ht 166.4 cm (65.5\")   Wt 58.8 kg (129 lb 11.2 oz)   LMP  (LMP Unknown)   SpO2 99%   BMI 21.25 kg/m²     Physical Exam  Vitals and nursing note reviewed.   Constitutional:       General: She is not in acute " distress.     Appearance: She is well-developed.   HENT:      Head: Normocephalic and atraumatic.      Nose: Nose normal.   Eyes:      General:         Right eye: No discharge.         Left eye: No discharge.      Conjunctiva/sclera: Conjunctivae normal.      Pupils: Pupils are equal, round, and reactive to light.   Neck:      Thyroid: No thyromegaly.      Trachea: No tracheal deviation.   Cardiovascular:      Rate and Rhythm: Normal rate and regular rhythm.      Heart sounds: Normal heart sounds. No murmur heard.  Pulmonary:      Effort: Pulmonary effort is normal. No respiratory distress.      Breath sounds: Normal breath sounds. No wheezing or rales.   Abdominal:      General: Bowel sounds are normal. There is no distension.      Palpations: Abdomen is soft. There is no mass.      Tenderness: There is no abdominal tenderness.      Hernia: No hernia is present.   Musculoskeletal:         General: No deformity. Normal range of motion.        Back:    Lymphadenopathy:      Cervical: No cervical adenopathy.   Skin:     General: Skin is warm and dry.   Neurological:      Mental Status: She is alert and oriented to person, place, and time.      Deep Tendon Reflexes: Reflexes are normal and symmetric.   Psychiatric:         Behavior: Behavior normal.         Thought Content: Thought content normal.         Judgment: Judgment normal.       Notes brought forward are reviewed and updated if indicated.     Assessment & Plan   Problems Addressed this Visit        Mental Health    Major depressive disorder, recurrent, in full remission (HCC)       Musculoskeletal and Injuries    Primary fibromyalgia syndrome - Primary (Chronic)    Relevant Medications    traMADol (ULTRAM) 50 MG tablet       Neuro    Degenerative disc disease, lumbar (Chronic)    Relevant Medications    traMADol (ULTRAM) 50 MG tablet      Other Visit Diagnoses     Major depressive disorder, recurrent episode, in partial remission (HCC)          Diagnoses        Codes Comments    Primary fibromyalgia syndrome    -  Primary ICD-10-CM: M79.7  ICD-9-CM: 729.1     Major depressive disorder, recurrent, in full remission (HCC)     ICD-10-CM: F33.42  ICD-9-CM: 296.36     Degenerative disc disease, lumbar     ICD-10-CM: M51.36  ICD-9-CM: 722.52     Major depressive disorder, recurrent episode, in partial remission (HCC)     ICD-10-CM: F33.41  ICD-9-CM: 296.35          [place] reviewed and appropriate. Not recommended to drive or operate heavy equipment while taking potentially sedating meds.  Patient understands the risks associated with this controlled medication, including tolerance and addiction. They also agree to obtain this medication only from me, and not from a another provider, unless that provider is covering for me in my absence. They also agree to be compliant in dosing, and not self adjust the dose of medication.  A signed controlled substance agreement is on file, and they have received a controlled substance education sheet at this or a previous visit. They have also signed a consent for treatment with a controlled substance as per [facility] policy.      New Medications Ordered This Visit   Medications   • traMADol (ULTRAM) 50 MG tablet     Sig: Take 1 tablet by mouth Every 8 (Eight) Hours As Needed for Severe Pain .     Dispense:  270 tablet     Refill:  0     Return in about 3 months (around 11/4/2022) for Recheck.        This document has been electronically signed by Anay Thomson MD on August 21, 2022 19:42 CDT

## 2022-08-23 DIAGNOSIS — G47.00 INSOMNIA, UNSPECIFIED TYPE: Chronic | ICD-10-CM

## 2022-08-23 DIAGNOSIS — M79.7 PRIMARY FIBROMYALGIA SYNDROME: Chronic | ICD-10-CM

## 2022-08-23 RX ORDER — LORAZEPAM 1 MG/1
1.5 TABLET ORAL
Qty: 135 TABLET | Refills: 0 | Status: SHIPPED | OUTPATIENT
Start: 2022-08-23 | End: 2022-11-08 | Stop reason: SDUPTHER

## 2022-09-20 DIAGNOSIS — M79.7 PRIMARY FIBROMYALGIA SYNDROME: Chronic | ICD-10-CM

## 2022-09-20 DIAGNOSIS — M51.36 DEGENERATIVE DISC DISEASE, LUMBAR: Chronic | ICD-10-CM

## 2022-09-20 NOTE — TELEPHONE ENCOUNTER
Dr. Thomson's patient    Last Rx 06/20/2022  #270, NR  (90 day supply)    Next Appt  With Family Medicine (Anay Thomson MD)  11/08/2022 at 1:30 PM    Last Rx 08/04/2022

## 2022-09-21 RX ORDER — GABAPENTIN 300 MG/1
300 CAPSULE ORAL 3 TIMES DAILY
Qty: 270 CAPSULE | Refills: 0 | Status: SHIPPED | OUTPATIENT
Start: 2022-09-21 | End: 2022-12-19 | Stop reason: SDUPTHER

## 2022-10-30 DIAGNOSIS — M51.36 DEGENERATIVE DISC DISEASE, LUMBAR: Chronic | ICD-10-CM

## 2022-10-30 DIAGNOSIS — I10 ESSENTIAL HYPERTENSION: Chronic | ICD-10-CM

## 2022-10-31 RX ORDER — MELOXICAM 15 MG/1
15 TABLET ORAL DAILY PRN
Qty: 90 TABLET | Refills: 1 | Status: SHIPPED | OUTPATIENT
Start: 2022-10-31

## 2022-10-31 RX ORDER — SPIRONOLACTONE 25 MG/1
50 TABLET ORAL DAILY
Qty: 180 TABLET | Refills: 1 | Status: SHIPPED | OUTPATIENT
Start: 2022-10-31

## 2022-11-08 ENCOUNTER — OFFICE VISIT (OUTPATIENT)
Dept: FAMILY MEDICINE CLINIC | Facility: CLINIC | Age: 72
End: 2022-11-08

## 2022-11-08 VITALS
BODY MASS INDEX: 20.33 KG/M2 | HEART RATE: 78 BPM | DIASTOLIC BLOOD PRESSURE: 84 MMHG | RESPIRATION RATE: 18 BRPM | OXYGEN SATURATION: 99 % | SYSTOLIC BLOOD PRESSURE: 136 MMHG | HEIGHT: 66 IN | WEIGHT: 126.5 LBS

## 2022-11-08 DIAGNOSIS — G47.00 INSOMNIA, UNSPECIFIED TYPE: Chronic | ICD-10-CM

## 2022-11-08 DIAGNOSIS — M79.7 PRIMARY FIBROMYALGIA SYNDROME: Primary | Chronic | ICD-10-CM

## 2022-11-08 DIAGNOSIS — M51.36 DEGENERATIVE DISC DISEASE, LUMBAR: Chronic | ICD-10-CM

## 2022-11-08 PROCEDURE — 99214 OFFICE O/P EST MOD 30 MIN: CPT | Performed by: GENERAL PRACTICE

## 2022-11-08 RX ORDER — PAROXETINE 10 MG/1
10 TABLET, FILM COATED ORAL EVERY MORNING
COMMUNITY

## 2022-11-08 RX ORDER — LORAZEPAM 1 MG/1
1.5 TABLET ORAL
Qty: 135 TABLET | Refills: 0 | Status: SHIPPED | OUTPATIENT
Start: 2022-11-08 | End: 2023-02-07 | Stop reason: SDUPTHER

## 2022-11-08 RX ORDER — TRAMADOL HYDROCHLORIDE 50 MG/1
50 TABLET ORAL EVERY 8 HOURS PRN
Qty: 270 TABLET | Refills: 0 | Status: SHIPPED | OUTPATIENT
Start: 2022-11-08 | End: 2023-02-07 | Stop reason: SDUPTHER

## 2022-11-08 NOTE — PROGRESS NOTES
Subjective   Angella Baez is a 72 y.o. female.   Chief Complaint   Patient presents with   • Med Refill     For review and evaluation of management of chronic medical problems. Records reviewed. Any recent labs, xrays reviewed and medications reconciled.   Fibromyalgia  This is a chronic problem. The current episode started more than 1 year ago. The problem occurs constantly. The problem has been unchanged. Associated symptoms include arthralgias and myalgias. Pertinent negatives include no abdominal pain, chills, congestion, coughing, fatigue, fever, headaches, joint swelling, nausea, neck pain, numbness, rash, sore throat, vomiting or weakness. The symptoms are aggravated by stress. She has tried NSAIDs and oral narcotics (gabapentin, tramadol) for the symptoms. The treatment provided mild relief.   Hypertension  This is a chronic problem. The current episode started more than 1 year ago. The problem has been gradually worsening since onset. Pertinent negatives include no headaches, neck pain or palpitations. There are no associated agents to hypertension. Risk factors for coronary artery disease include stress (pain). Past treatments include diuretics. The current treatment provides moderate improvement. There are no compliance problems.    Back Pain  This is a chronic problem. The current episode started more than 1 year ago. The problem occurs constantly. The problem is unchanged. The pain is present in the lumbar spine and gluteal. The pain radiates to the right foot and left foot. The pain is at a severity of 4/10. The pain is moderate. The pain is worse during the day. The symptoms are aggravated by bending and standing. Stiffness is present in the morning. Pertinent negatives include no abdominal pain, dysuria, fever, headaches, numbness or weakness. She has tried analgesics (gabapentin, epidural injection, tramadol) for the symptoms. The treatment provided moderate relief.   Insomnia  This is a  chronic problem. The current episode started more than 1 year ago. The problem occurs constantly. The problem has been unchanged. Associated symptoms include arthralgias and myalgias. Pertinent negatives include no abdominal pain, chills, congestion, coughing, fatigue, fever, headaches, joint swelling, nausea, neck pain, numbness, rash, sore throat, vomiting or weakness. The symptoms are aggravated by stress. Treatments tried: lorazepam. The treatment provided significant relief.      The following portions of the patient's history were reviewed and updated as appropriate: allergies, current medications, past social history and problem list.    Outpatient Medications Prior to Visit   Medication Sig Dispense Refill   • docusate sodium (COLACE) 100 MG capsule Take 100 mg by mouth Every Night. Takes 4 cap nightly     • gabapentin (NEURONTIN) 300 MG capsule Take 1 capsule by mouth 3 (Three) Times a Day. 270 capsule 0   • Magnesium 250 MG tablet Take 1 tablet by mouth.     • meloxicam (MOBIC) 15 MG tablet TAKE 1 TABLET BY MOUTH DAILY AS NEEDED FOR MODERATE PAIN 90 tablet 1   • PARoxetine (PAXIL) 10 MG tablet Take 1 tablet by mouth Every Morning.     • spironolactone (ALDACTONE) 25 MG tablet TAKE 2 TABLETS BY MOUTH DAILY 180 tablet 1   • LORazepam (ATIVAN) 1 MG tablet Take 1.5 tablets by mouth every night at bedtime. 135 tablet 0   • traMADol (ULTRAM) 50 MG tablet Take 1 tablet by mouth Every 8 (Eight) Hours As Needed for Severe Pain . 270 tablet 0   • budesonide (RINOCORT AQUA) 32 MCG/ACT nasal spray 2 sprays into the nostril(s) as directed by provider Daily. 8.6 mL 5     No facility-administered medications prior to visit.       Review of Systems   Constitutional: Negative for chills, fatigue and fever.   HENT: Negative for congestion and sore throat.    Respiratory: Negative for cough.    Cardiovascular: Negative for palpitations.   Gastrointestinal: Negative for abdominal pain, nausea and vomiting.   Genitourinary:  "Negative for dysuria.   Musculoskeletal: Positive for arthralgias, back pain and myalgias. Negative for joint swelling and neck pain.   Skin: Negative for rash.   Neurological: Negative for weakness, numbness and headaches.   Psychiatric/Behavioral: The patient has insomnia.      I have reviewed 12 systems with patient. Findings were negative except what is noted below and/or in history of present illness.     Objective   Visit Vitals  /84   Pulse 78   Resp 18   Ht 166.4 cm (65.5\")   Wt 57.4 kg (126 lb 8 oz)   LMP  (LMP Unknown)   SpO2 99%   BMI 20.73 kg/m²     Physical Exam  Vitals and nursing note reviewed.   Constitutional:       General: She is not in acute distress.     Appearance: She is well-developed.   HENT:      Head: Normocephalic and atraumatic.      Nose: Nose normal.   Eyes:      General:         Right eye: No discharge.         Left eye: No discharge.      Conjunctiva/sclera: Conjunctivae normal.      Pupils: Pupils are equal, round, and reactive to light.   Neck:      Thyroid: No thyromegaly.      Trachea: No tracheal deviation.   Cardiovascular:      Rate and Rhythm: Normal rate and regular rhythm.      Heart sounds: Normal heart sounds. No murmur heard.  Pulmonary:      Effort: Pulmonary effort is normal. No respiratory distress.      Breath sounds: Normal breath sounds. No wheezing or rales.   Abdominal:      General: Bowel sounds are normal. There is no distension.      Palpations: Abdomen is soft. There is no mass.      Tenderness: There is no abdominal tenderness.      Hernia: No hernia is present.   Musculoskeletal:         General: No deformity. Normal range of motion.        Back:    Lymphadenopathy:      Cervical: No cervical adenopathy.   Skin:     General: Skin is warm and dry.   Neurological:      Mental Status: She is alert and oriented to person, place, and time.      Deep Tendon Reflexes: Reflexes are normal and symmetric.   Psychiatric:         Behavior: Behavior normal.        "  Thought Content: Thought content normal.         Judgment: Judgment normal.         Notes brought forward are reviewed and updated if indicated.     Assessment & Plan   Problems Addressed this Visit        Musculoskeletal and Injuries    Primary fibromyalgia syndrome - Primary (Chronic)    Relevant Medications    LORazepam (ATIVAN) 1 MG tablet    traMADol (ULTRAM) 50 MG tablet       Neuro    Degenerative disc disease, lumbar (Chronic)    Relevant Medications    traMADol (ULTRAM) 50 MG tablet       Sleep    Insomnia (Chronic)    Relevant Medications    LORazepam (ATIVAN) 1 MG tablet   Diagnoses       Codes Comments    Primary fibromyalgia syndrome    -  Primary ICD-10-CM: M79.7  ICD-9-CM: 729.1     Insomnia, unspecified type     ICD-10-CM: G47.00  ICD-9-CM: 780.52     Degenerative disc disease, lumbar     ICD-10-CM: M51.36  ICD-9-CM: 722.52           Continue current medications. Instructions given regarding proper use and potential risks and side effects. Advised to recheck if is having any issues with this medication.  Zaire reviewed and appropriate. Not recommended to drive or operate heavy equipment while taking potentially sedating meds.  Patient understands the risks associated with this controlled medication, including tolerance and addiction. They also agree to obtain this medication only from me, and not from a another provider, unless that provider is covering for me in my absence. They also agree to be compliant in dosing, and not self adjust the dose of medication.  A signed controlled substance agreement is on file, and they have received a controlled substance education sheet at this or a previous visit. They have also signed a consent for treatment with a controlled substance as per [facility] policy.      New Medications Ordered This Visit   Medications   • LORazepam (ATIVAN) 1 MG tablet     Sig: Take 1.5 tablets by mouth every night at bedtime.     Dispense:  135 tablet     Refill:  0   •  traMADol (ULTRAM) 50 MG tablet     Sig: Take 1 tablet by mouth Every 8 (Eight) Hours As Needed for Severe Pain.     Dispense:  270 tablet     Refill:  0     Return in about 3 months (around 2/6/2023) for Recheck.        This document has been electronically signed by Anay Thomson MD on November 20, 2022 17:56 CST    Answers for HPI/ROS submitted by the patient on 11/8/2022  Please describe your symptoms.: Recheck  Have you had these symptoms before?: Yes  How long have you been having these symptoms?: Greater than 2 weeks  What is the primary reason for your visit?: Other

## 2022-11-20 PROBLEM — M35.00 SICCA (HCC): Status: RESOLVED | Noted: 2017-11-30 | Resolved: 2022-11-20

## 2022-12-19 DIAGNOSIS — M51.36 DEGENERATIVE DISC DISEASE, LUMBAR: Chronic | ICD-10-CM

## 2022-12-19 DIAGNOSIS — M79.7 PRIMARY FIBROMYALGIA SYNDROME: Chronic | ICD-10-CM

## 2022-12-19 RX ORDER — GABAPENTIN 300 MG/1
300 CAPSULE ORAL 3 TIMES DAILY
Qty: 270 CAPSULE | Refills: 0 | Status: SHIPPED | OUTPATIENT
Start: 2022-12-19 | End: 2023-02-07 | Stop reason: SDUPTHER

## 2023-02-07 ENCOUNTER — OFFICE VISIT (OUTPATIENT)
Dept: FAMILY MEDICINE CLINIC | Facility: CLINIC | Age: 73
End: 2023-02-07
Payer: MEDICARE

## 2023-02-07 VITALS
HEART RATE: 82 BPM | DIASTOLIC BLOOD PRESSURE: 80 MMHG | HEIGHT: 66 IN | SYSTOLIC BLOOD PRESSURE: 130 MMHG | OXYGEN SATURATION: 98 % | BODY MASS INDEX: 21.05 KG/M2 | WEIGHT: 131 LBS

## 2023-02-07 DIAGNOSIS — M79.7 PRIMARY FIBROMYALGIA SYNDROME: Chronic | ICD-10-CM

## 2023-02-07 DIAGNOSIS — G47.00 INSOMNIA, UNSPECIFIED TYPE: Chronic | ICD-10-CM

## 2023-02-07 DIAGNOSIS — M51.36 DEGENERATIVE DISC DISEASE, LUMBAR: Chronic | ICD-10-CM

## 2023-02-07 PROCEDURE — 99213 OFFICE O/P EST LOW 20 MIN: CPT | Performed by: GENERAL PRACTICE

## 2023-02-07 RX ORDER — GABAPENTIN 300 MG/1
300 CAPSULE ORAL 3 TIMES DAILY
Qty: 252 CAPSULE | Refills: 0 | Status: SHIPPED | OUTPATIENT
Start: 2023-02-07 | End: 2023-03-27 | Stop reason: SDUPTHER

## 2023-02-07 RX ORDER — TRAMADOL HYDROCHLORIDE 50 MG/1
50 TABLET ORAL EVERY 8 HOURS PRN
Qty: 252 TABLET | Refills: 0 | Status: SHIPPED | OUTPATIENT
Start: 2023-02-07

## 2023-02-07 RX ORDER — LORAZEPAM 1 MG/1
1.5 TABLET ORAL
Qty: 126 TABLET | Refills: 0 | Status: SHIPPED | OUTPATIENT
Start: 2023-02-07 | End: 2023-02-13 | Stop reason: SDUPTHER

## 2023-02-07 NOTE — PROGRESS NOTES
Subjective   Angella Baez is a 72 y.o. female.   Chief Complaint   Patient presents with   • Fibromyalgia     For review and evaluation of management of chronic medical problems. Records reviewed. Any recent labs, xrays reviewed and medications reconciled. Wakes up at night with calves hurting and burning.  Otherwise pain is fairly well controlled.  She is sleeping well at night.  Fibromyalgia  This is a chronic problem. The current episode started more than 1 year ago. The problem occurs constantly. The problem has been unchanged. Associated symptoms include arthralgias and myalgias. Pertinent negatives include no abdominal pain, chills, congestion, coughing, fatigue, fever, joint swelling, nausea, numbness, rash, sore throat, vomiting or weakness. The symptoms are aggravated by stress. She has tried NSAIDs and oral narcotics (gabapentin, tramadol) for the symptoms. The treatment provided mild relief.   Back Pain  This is a chronic problem. The current episode started more than 1 year ago. The problem occurs constantly. The problem is unchanged. The pain is present in the lumbar spine and gluteal. The pain radiates to the right foot and left foot. The pain is at a severity of 5/10. The pain is moderate. The pain is worse during the day. The symptoms are aggravated by bending and standing. Stiffness is present in the morning. Pertinent negatives include no abdominal pain, dysuria, fever, numbness or weakness. She has tried analgesics (gabapentin, epidural injection, tramadol) for the symptoms. The treatment provided moderate relief.   Insomnia  This is a chronic problem. The current episode started more than 1 year ago. The problem occurs constantly. The problem has been unchanged. Associated symptoms include arthralgias and myalgias. Pertinent negatives include no abdominal pain, chills, congestion, coughing, fatigue, fever, joint swelling, nausea, numbness, rash, sore throat, vomiting or weakness. The  symptoms are aggravated by stress. Treatments tried: lorazepam. The treatment provided significant relief.      The following portions of the patient's history were reviewed and updated as appropriate: allergies, current medications, past social history and problem list.    Outpatient Medications Prior to Visit   Medication Sig Dispense Refill   • docusate sodium (COLACE) 100 MG capsule Take 100 mg by mouth Every Night. Takes 4 cap nightly     • Magnesium 250 MG tablet Take 1 tablet by mouth.     • meloxicam (MOBIC) 15 MG tablet TAKE 1 TABLET BY MOUTH DAILY AS NEEDED FOR MODERATE PAIN 90 tablet 1   • PARoxetine (PAXIL) 10 MG tablet Take 1 tablet by mouth Every Morning.     • spironolactone (ALDACTONE) 25 MG tablet TAKE 2 TABLETS BY MOUTH DAILY 180 tablet 1   • gabapentin (NEURONTIN) 300 MG capsule Take 1 capsule by mouth 3 (Three) Times a Day. 270 capsule 0   • LORazepam (ATIVAN) 1 MG tablet Take 1.5 tablets by mouth every night at bedtime. 135 tablet 0   • traMADol (ULTRAM) 50 MG tablet Take 1 tablet by mouth Every 8 (Eight) Hours As Needed for Severe Pain. 270 tablet 0   • budesonide (RINOCORT AQUA) 32 MCG/ACT nasal spray 2 sprays into the nostril(s) as directed by provider Daily. 8.6 mL 5     No facility-administered medications prior to visit.       Review of Systems   Constitutional: Negative for chills, fatigue and fever.   HENT: Negative for congestion and sore throat.    Respiratory: Negative for cough.    Gastrointestinal: Negative for abdominal pain, nausea and vomiting.   Genitourinary: Negative for dysuria.   Musculoskeletal: Positive for arthralgias, back pain and myalgias. Negative for joint swelling.   Skin: Negative for rash.   Neurological: Negative for weakness and numbness.   Psychiatric/Behavioral: The patient has insomnia.      I have reviewed 12 systems with patient. Findings were negative except what is noted below and/or in history of present illness.     Objective   Visit Vitals  /80  "  Pulse 82   Ht 166.4 cm (65.5\")   Wt 59.4 kg (131 lb)   LMP  (LMP Unknown)   SpO2 98%   BMI 21.47 kg/m²     Physical Exam  Vitals and nursing note reviewed.   Constitutional:       General: She is not in acute distress.     Appearance: She is well-developed.   HENT:      Head: Normocephalic and atraumatic.      Nose: Nose normal.   Eyes:      General:         Right eye: No discharge.         Left eye: No discharge.      Conjunctiva/sclera: Conjunctivae normal.      Pupils: Pupils are equal, round, and reactive to light.   Neck:      Thyroid: No thyromegaly.      Trachea: No tracheal deviation.   Cardiovascular:      Rate and Rhythm: Normal rate and regular rhythm.      Heart sounds: Normal heart sounds. No murmur heard.  Pulmonary:      Effort: Pulmonary effort is normal. No respiratory distress.      Breath sounds: Normal breath sounds. No wheezing or rales.   Abdominal:      General: Bowel sounds are normal. There is no distension.      Palpations: Abdomen is soft. There is no mass.      Tenderness: There is no abdominal tenderness.      Hernia: No hernia is present.   Musculoskeletal:         General: No deformity. Normal range of motion.        Back:       Comments: General muscle tenderness   Lymphadenopathy:      Cervical: No cervical adenopathy.   Skin:     General: Skin is warm and dry.   Neurological:      Mental Status: She is alert and oriented to person, place, and time.      Deep Tendon Reflexes: Reflexes are normal and symmetric.   Psychiatric:         Behavior: Behavior normal.         Thought Content: Thought content normal.         Judgment: Judgment normal.       Notes brought forward are reviewed and updated if indicated.     Assessment & Plan   Problems Addressed this Visit        Musculoskeletal and Injuries    Primary fibromyalgia syndrome (Chronic)    Relevant Medications    LORazepam (ATIVAN) 1 MG tablet    gabapentin (NEURONTIN) 300 MG capsule    traMADol (ULTRAM) 50 MG tablet       Neuro "    Degenerative disc disease, lumbar (Chronic)    Relevant Medications    gabapentin (NEURONTIN) 300 MG capsule    traMADol (ULTRAM) 50 MG tablet       Sleep    Insomnia (Chronic)    Relevant Medications    LORazepam (ATIVAN) 1 MG tablet   Diagnoses       Codes Comments    Primary fibromyalgia syndrome     ICD-10-CM: M79.7  ICD-9-CM: 729.1     Insomnia, unspecified type     ICD-10-CM: G47.00  ICD-9-CM: 780.52     Degenerative disc disease, lumbar     ICD-10-CM: M51.36  ICD-9-CM: 722.52           Continue current medications. Instructions given regarding proper use and potential risks and side effects. Advised to recheck if is having any issues with this medication.  Zaire reviewed and appropriate. Not recommended to drive or operate heavy equipment while taking potentially sedating meds.  Patient understands the risks associated with this controlled medication, including tolerance and addiction. They also agree to obtain this medication only from me, and not from a another provider, unless that provider is covering for me in my absence. They also agree to be compliant in dosing, and not self adjust the dose of medication.  A signed controlled substance agreement is on file, and they have received a controlled substance education sheet at this or a previous visit. They have also signed a consent for treatment with a controlled substance as per [facility] policy.      New Medications Ordered This Visit   Medications   • LORazepam (ATIVAN) 1 MG tablet     Sig: Take 1.5 tablets by mouth every night at bedtime.     Dispense:  126 tablet     Refill:  0   • gabapentin (NEURONTIN) 300 MG capsule     Sig: Take 1 capsule by mouth 3 (Three) Times a Day.     Dispense:  252 capsule     Refill:  0     Will call for Prescription   • traMADol (ULTRAM) 50 MG tablet     Sig: Take 1 tablet by mouth Every 8 (Eight) Hours As Needed for Severe Pain.     Dispense:  252 tablet     Refill:  0     Will call for Prescription     Return  in about 3 months (around 5/5/2023) for medicare wellness visit, Annual physical.        This document has been electronically signed by Anay Thomson MD on February 7, 2023 12:10 CST

## 2023-02-13 DIAGNOSIS — G47.00 INSOMNIA, UNSPECIFIED TYPE: Chronic | ICD-10-CM

## 2023-02-13 DIAGNOSIS — M79.7 PRIMARY FIBROMYALGIA SYNDROME: Chronic | ICD-10-CM

## 2023-02-13 RX ORDER — LORAZEPAM 1 MG/1
1.5 TABLET ORAL
Qty: 135 TABLET | Refills: 0 | Status: SHIPPED | OUTPATIENT
Start: 2023-02-13 | End: 2023-02-16 | Stop reason: SDUPTHER

## 2023-02-16 DIAGNOSIS — M79.7 PRIMARY FIBROMYALGIA SYNDROME: Chronic | ICD-10-CM

## 2023-02-16 DIAGNOSIS — G47.00 INSOMNIA, UNSPECIFIED TYPE: Chronic | ICD-10-CM

## 2023-02-16 RX ORDER — LORAZEPAM 1 MG/1
TABLET ORAL
Qty: 168 TABLET | Refills: 0 | Status: SHIPPED | OUTPATIENT
Start: 2023-02-16

## 2023-03-06 DIAGNOSIS — Z78.0 POST-MENOPAUSAL: Primary | ICD-10-CM

## 2023-03-06 DIAGNOSIS — Z12.31 ENCOUNTER FOR SCREENING MAMMOGRAM FOR MALIGNANT NEOPLASM OF BREAST: ICD-10-CM

## 2023-03-27 DIAGNOSIS — M51.36 DEGENERATIVE DISC DISEASE, LUMBAR: Chronic | ICD-10-CM

## 2023-03-27 DIAGNOSIS — M79.7 PRIMARY FIBROMYALGIA SYNDROME: Chronic | ICD-10-CM

## 2023-03-27 RX ORDER — GABAPENTIN 300 MG/1
300 CAPSULE ORAL 3 TIMES DAILY
Qty: 252 CAPSULE | Refills: 0 | Status: SHIPPED | OUTPATIENT
Start: 2023-03-27

## 2023-05-04 ENCOUNTER — LAB (OUTPATIENT)
Dept: LAB | Facility: HOSPITAL | Age: 73
End: 2023-05-04
Payer: MEDICARE

## 2023-05-04 DIAGNOSIS — M51.36 DEGENERATIVE DISC DISEASE, LUMBAR: ICD-10-CM

## 2023-05-04 DIAGNOSIS — I10 ESSENTIAL HYPERTENSION: Primary | ICD-10-CM

## 2023-05-04 DIAGNOSIS — I10 ESSENTIAL HYPERTENSION: ICD-10-CM

## 2023-05-04 LAB
ALBUMIN SERPL-MCNC: 4.4 G/DL (ref 3.5–5.2)
ALBUMIN/GLOB SERPL: 1.6 G/DL
ALP SERPL-CCNC: 74 U/L (ref 39–117)
ALT SERPL W P-5'-P-CCNC: 14 U/L (ref 1–33)
AMPHET+METHAMPHET UR QL: NEGATIVE
AMPHETAMINES UR QL: NEGATIVE
ANION GAP SERPL CALCULATED.3IONS-SCNC: 10.2 MMOL/L (ref 5–15)
AST SERPL-CCNC: 20 U/L (ref 1–32)
BACTERIA UR QL AUTO: ABNORMAL /HPF
BARBITURATES UR QL SCN: NEGATIVE
BASOPHILS # BLD AUTO: 0.01 10*3/MM3 (ref 0–0.2)
BASOPHILS NFR BLD AUTO: 0.3 % (ref 0–1.5)
BENZODIAZ UR QL SCN: POSITIVE
BILIRUB SERPL-MCNC: 0.3 MG/DL (ref 0–1.2)
BILIRUB UR QL STRIP: NEGATIVE
BUN SERPL-MCNC: 15 MG/DL (ref 8–23)
BUN/CREAT SERPL: 20 (ref 7–25)
BUPRENORPHINE SERPL-MCNC: NEGATIVE NG/ML
CALCIUM SPEC-SCNC: 9.4 MG/DL (ref 8.6–10.5)
CANNABINOIDS SERPL QL: NEGATIVE
CHLORIDE SERPL-SCNC: 94 MMOL/L (ref 98–107)
CHOLEST SERPL-MCNC: 238 MG/DL (ref 0–200)
CLARITY UR: ABNORMAL
CO2 SERPL-SCNC: 25.8 MMOL/L (ref 22–29)
COCAINE UR QL: NEGATIVE
COLOR UR: ABNORMAL
CREAT SERPL-MCNC: 0.75 MG/DL (ref 0.57–1)
DEPRECATED RDW RBC AUTO: 40 FL (ref 37–54)
EGFRCR SERPLBLD CKD-EPI 2021: 85.8 ML/MIN/1.73
EOSINOPHIL # BLD AUTO: 0.13 10*3/MM3 (ref 0–0.4)
EOSINOPHIL NFR BLD AUTO: 3.9 % (ref 0.3–6.2)
ERYTHROCYTE [DISTWIDTH] IN BLOOD BY AUTOMATED COUNT: 12.9 % (ref 12.3–15.4)
FENTANYL UR-MCNC: NEGATIVE NG/ML
GLOBULIN UR ELPH-MCNC: 2.8 GM/DL
GLUCOSE SERPL-MCNC: 84 MG/DL (ref 65–99)
GLUCOSE UR STRIP-MCNC: NEGATIVE MG/DL
HCT VFR BLD AUTO: 37.2 % (ref 34–46.6)
HDLC SERPL-MCNC: 58 MG/DL (ref 40–60)
HGB BLD-MCNC: 12.7 G/DL (ref 12–15.9)
HGB UR QL STRIP.AUTO: NEGATIVE
HYALINE CASTS UR QL AUTO: ABNORMAL /LPF
IMM GRANULOCYTES # BLD AUTO: 0.02 10*3/MM3 (ref 0–0.05)
IMM GRANULOCYTES NFR BLD AUTO: 0.6 % (ref 0–0.5)
KETONES UR QL STRIP: ABNORMAL
LDLC SERPL CALC-MCNC: 168 MG/DL (ref 0–100)
LDLC/HDLC SERPL: 2.86 {RATIO}
LEUKOCYTE ESTERASE UR QL STRIP.AUTO: ABNORMAL
LYMPHOCYTES # BLD AUTO: 0.89 10*3/MM3 (ref 0.7–3.1)
LYMPHOCYTES NFR BLD AUTO: 26.9 % (ref 19.6–45.3)
MCH RBC QN AUTO: 30 PG (ref 26.6–33)
MCHC RBC AUTO-ENTMCNC: 34.1 G/DL (ref 31.5–35.7)
MCV RBC AUTO: 87.7 FL (ref 79–97)
METHADONE UR QL SCN: NEGATIVE
MONOCYTES # BLD AUTO: 0.4 10*3/MM3 (ref 0.1–0.9)
MONOCYTES NFR BLD AUTO: 12.1 % (ref 5–12)
NEUTROPHILS NFR BLD AUTO: 1.86 10*3/MM3 (ref 1.7–7)
NEUTROPHILS NFR BLD AUTO: 56.2 % (ref 42.7–76)
NITRITE UR QL STRIP: NEGATIVE
NRBC BLD AUTO-RTO: 0 /100 WBC (ref 0–0.2)
OPIATES UR QL: NEGATIVE
OXYCODONE UR QL SCN: NEGATIVE
PCP UR QL SCN: NEGATIVE
PH UR STRIP.AUTO: 8.5 [PH] (ref 5–9)
PLATELET # BLD AUTO: 282 10*3/MM3 (ref 140–450)
PMV BLD AUTO: 10.3 FL (ref 6–12)
POTASSIUM SERPL-SCNC: 4.4 MMOL/L (ref 3.5–5.2)
PROPOXYPH UR QL: NEGATIVE
PROT SERPL-MCNC: 7.2 G/DL (ref 6–8.5)
PROT UR QL STRIP: ABNORMAL
RBC # BLD AUTO: 4.24 10*6/MM3 (ref 3.77–5.28)
RBC # UR STRIP: ABNORMAL /HPF
REF LAB TEST METHOD: ABNORMAL
SODIUM SERPL-SCNC: 130 MMOL/L (ref 136–145)
SP GR UR STRIP: 1.02 (ref 1–1.03)
SQUAMOUS #/AREA URNS HPF: ABNORMAL /HPF
TRICYCLICS UR QL SCN: NEGATIVE
TRIGL SERPL-MCNC: 72 MG/DL (ref 0–150)
UROBILINOGEN UR QL STRIP: ABNORMAL
VLDLC SERPL-MCNC: 12 MG/DL (ref 5–40)
WBC # UR STRIP: ABNORMAL /HPF
WBC NRBC COR # BLD: 3.31 10*3/MM3 (ref 3.4–10.8)

## 2023-05-04 PROCEDURE — 36415 COLL VENOUS BLD VENIPUNCTURE: CPT

## 2023-05-04 PROCEDURE — 80061 LIPID PANEL: CPT

## 2023-05-04 PROCEDURE — 81001 URINALYSIS AUTO W/SCOPE: CPT

## 2023-05-04 PROCEDURE — 87086 URINE CULTURE/COLONY COUNT: CPT

## 2023-05-04 PROCEDURE — 85025 COMPLETE CBC W/AUTO DIFF WBC: CPT

## 2023-05-04 PROCEDURE — 80307 DRUG TEST PRSMV CHEM ANLYZR: CPT | Performed by: GENERAL PRACTICE

## 2023-05-04 PROCEDURE — 80053 COMPREHEN METABOLIC PANEL: CPT

## 2023-05-05 LAB — BACTERIA SPEC AEROBE CULT: NORMAL

## 2023-05-09 ENCOUNTER — OFFICE VISIT (OUTPATIENT)
Dept: FAMILY MEDICINE CLINIC | Facility: CLINIC | Age: 73
End: 2023-05-09
Payer: MEDICARE

## 2023-05-09 VITALS
WEIGHT: 131.1 LBS | HEIGHT: 66 IN | SYSTOLIC BLOOD PRESSURE: 128 MMHG | OXYGEN SATURATION: 98 % | HEART RATE: 84 BPM | DIASTOLIC BLOOD PRESSURE: 78 MMHG | BODY MASS INDEX: 21.07 KG/M2

## 2023-05-09 DIAGNOSIS — Z46.89 PESSARY MAINTENANCE: ICD-10-CM

## 2023-05-09 DIAGNOSIS — Z12.4 SCREENING FOR CERVICAL CANCER: ICD-10-CM

## 2023-05-09 DIAGNOSIS — G47.00 INSOMNIA, UNSPECIFIED TYPE: Chronic | ICD-10-CM

## 2023-05-09 DIAGNOSIS — N89.8 VAGINAL DISCHARGE: ICD-10-CM

## 2023-05-09 DIAGNOSIS — M51.36 DEGENERATIVE DISC DISEASE, LUMBAR: Chronic | ICD-10-CM

## 2023-05-09 DIAGNOSIS — I10 ESSENTIAL HYPERTENSION: Chronic | ICD-10-CM

## 2023-05-09 DIAGNOSIS — N81.10 VAGINAL PROLAPSE: ICD-10-CM

## 2023-05-09 DIAGNOSIS — Z00.00 MEDICARE ANNUAL WELLNESS VISIT, SUBSEQUENT: Primary | ICD-10-CM

## 2023-05-09 DIAGNOSIS — M79.7 PRIMARY FIBROMYALGIA SYNDROME: Chronic | ICD-10-CM

## 2023-05-09 LAB
CANDIDA ALBICANS: NEGATIVE
GARDNERELLA VAGINALIS: POSITIVE
T VAGINALIS DNA VAG QL PROBE+SIG AMP: NEGATIVE

## 2023-05-09 PROCEDURE — 87660 TRICHOMONAS VAGIN DIR PROBE: CPT | Performed by: GENERAL PRACTICE

## 2023-05-09 PROCEDURE — 87510 GARDNER VAG DNA DIR PROBE: CPT | Performed by: GENERAL PRACTICE

## 2023-05-09 PROCEDURE — 87480 CANDIDA DNA DIR PROBE: CPT | Performed by: GENERAL PRACTICE

## 2023-05-09 RX ORDER — PAROXETINE 10 MG/1
10 TABLET, FILM COATED ORAL EVERY OTHER DAY
Qty: 45 TABLET | Refills: 1 | Status: SHIPPED | OUTPATIENT
Start: 2023-05-09

## 2023-05-09 RX ORDER — LORAZEPAM 1 MG/1
TABLET ORAL
Qty: 180 TABLET | Refills: 0 | Status: SHIPPED | OUTPATIENT
Start: 2023-05-09 | End: 2023-05-11 | Stop reason: SDUPTHER

## 2023-05-09 RX ORDER — CLINDAMYCIN PHOSPHATE 20 MG/G
1 CREAM VAGINAL NIGHTLY
Qty: 7 G | Refills: 0 | Status: SHIPPED | OUTPATIENT
Start: 2023-05-09 | End: 2023-05-16

## 2023-05-09 RX ORDER — SPIRONOLACTONE 25 MG/1
50 TABLET ORAL DAILY
Qty: 180 TABLET | Refills: 1 | Status: SHIPPED | OUTPATIENT
Start: 2023-05-09

## 2023-05-09 RX ORDER — GABAPENTIN 300 MG/1
300 CAPSULE ORAL 3 TIMES DAILY
Qty: 270 CAPSULE | Refills: 0 | Status: SHIPPED | OUTPATIENT
Start: 2023-05-09

## 2023-05-09 NOTE — PATIENT INSTRUCTIONS
Medicare Wellness  Personal Prevention Plan of Service     Date of Office Visit:    Encounter Provider:  Anay Thomson MD  Place of Service:  [facility] PRIMARY CARE [facility]  Patient Name: Angella Baez  :  1950    As part of the Medicare Wellness portion of your visit today, we are providing you with this personalized preventive plan of services (PPPS). This plan is based upon recommendations of the [place] Preventive Services Task Force (USPSTF) and the Advisory Committee on Immunization Practices (ACIP).    This lists the preventive care services that should be considered, and provides dates of when you are due. Items listed as completed are up-to-date and do not require any further intervention.    Health Maintenance   Topic Date Due    DXA SCAN  2023    ANNUAL WELLNESS VISIT  2023    INFLUENZA VACCINE  2023    MAMMOGRAM  2024    LIPID PANEL  2024    TDAP/TD VACCINES (2 - Td or Tdap) 2027    HEPATITIS C SCREENING  Completed    COVID-19 Vaccine  Completed    Pneumococcal Vaccine 65+  Completed    ZOSTER VACCINE  Addressed    PAP SMEAR  Discontinued    COLORECTAL CANCER SCREENING  Discontinued       No orders of the defined types were placed in this encounter.      No follow-ups on file.

## 2023-05-09 NOTE — PROGRESS NOTES
The ABCs of the Annual Wellness Visit  Subsequent Medicare Wellness Visit    Chief Complaint   Patient presents with   • CPE      Subjective    History of Present Illness:  Angella Baez is a 72 y.o. female who presents for a Subsequent Medicare Wellness Visit.    The following portions of the patient's history were reviewed and   updated as appropriate: allergies, current medications, past family history, past medical history, past social history, past surgical history and problem list.    Compared to one year ago, the patient feels her physical   health is the same.    Compared to one year ago, the patient feels her mental   health is the same.    Recent Hospitalizations:  She was not admitted to the hospital during the last year.       Current Medical Providers:  Patient Care Team:  Anay Thomson MD as PCP - General (Family Medicine)  Marty Purcell MD as Consulting Physician (Dermatology)  Henry Estrada DO as Consulting Physician (Gastroenterology)    Outpatient Medications Prior to Visit   Medication Sig Dispense Refill   • docusate sodium (COLACE) 100 MG capsule Take 1 capsule by mouth Every Night. Takes 4 cap nightly     • Magnesium 250 MG tablet Take 1 tablet by mouth.     • meloxicam (MOBIC) 15 MG tablet TAKE 1 TABLET BY MOUTH DAILY AS NEEDED FOR MODERATE PAIN 90 tablet 1   • traMADol (ULTRAM) 50 MG tablet Take 1 tablet by mouth Every 8 (Eight) Hours As Needed for Severe Pain. 252 tablet 0   • gabapentin (NEURONTIN) 300 MG capsule Take 1 capsule by mouth 3 (Three) Times a Day. 252 capsule 0   • LORazepam (ATIVAN) 1 MG tablet 1-2 tab qhs prn for sleep 168 tablet 0   • PARoxetine (PAXIL) 10 MG tablet Take 1 tablet by mouth Every Morning.     • spironolactone (ALDACTONE) 25 MG tablet TAKE 2 TABLETS BY MOUTH DAILY 180 tablet 1     No facility-administered medications prior to visit.       Opioid medication/s are on active medication list.  and I have evaluated her active treatment plan  "and pain score trends (see table).  There were no vitals filed for this visit.  I have reviewed the chart for potential of high risk medication and harmful drug interactions in the elderly.            Aspirin is not on active medication list.  Aspirin use is not indicated based on review of current medical condition/s. Risk of harm outweighs potential benefits.  .    Patient Active Problem List   Diagnosis   • Depressive disorder   • Essential hypertension   • Primary fibromyalgia syndrome   • Insomnia   • Acute midline low back pain with right-sided sciatica   • Degenerative disc disease, lumbar   • Osteopenia   • Pain in both thighs   • Chronic right SI joint pain   • Major depressive disorder, recurrent, in full remission   • Stress incontinence   • Paresthesia   • Dermatitis   • Abnormal laboratory test   • Viral respiratory illness   • Cough   • Stuffy and runny nose   • Encounter for laboratory testing for COVID-19 virus     Advance Care Planning  Advance Directive is not on file.  ACP discussion was held with the patient during this visit. Patient does not have an advance directive, information provided.          Objective    Vitals:    05/09/23 1050   BP: 128/78   BP Location: Right arm   Pulse: 84   SpO2: 98%   Weight: 59.5 kg (131 lb 1.6 oz)   Height: 166.4 cm (65.5\")     Estimated body mass index is 21.48 kg/m² as calculated from the following:    Height as of this encounter: 166.4 cm (65.5\").    Weight as of this encounter: 59.5 kg (131 lb 1.6 oz).    BMI is within normal parameters. No other follow-up for BMI required.      Does the patient have evidence of cognitive impairment? No    Physical Exam  Vitals and nursing note reviewed.   Constitutional:       General: She is not in acute distress.     Appearance: She is well-developed.   HENT:      Head: Normocephalic and atraumatic.      Nose: Nose normal.   Eyes:      General:         Right eye: No discharge.         Left eye: No discharge.      " Conjunctiva/sclera: Conjunctivae normal.      Pupils: Pupils are equal, round, and reactive to light.   Neck:      Thyroid: No thyromegaly.      Trachea: No tracheal deviation.   Cardiovascular:      Rate and Rhythm: Normal rate and regular rhythm.      Heart sounds: Normal heart sounds. No murmur heard.  Pulmonary:      Effort: Pulmonary effort is normal. No respiratory distress.      Breath sounds: Normal breath sounds. No wheezing or rales.   Chest:      Chest wall: No tenderness.   Breasts:     Right: No inverted nipple, mass, nipple discharge, skin change or tenderness.      Left: No inverted nipple, mass, nipple discharge, skin change or tenderness.   Abdominal:      General: Bowel sounds are normal. There is no distension.      Palpations: Abdomen is soft. There is no mass.      Tenderness: There is no abdominal tenderness.      Hernia: No hernia is present.   Musculoskeletal:         General: No deformity. Normal range of motion.   Lymphadenopathy:      Cervical: No cervical adenopathy.   Skin:     General: Skin is warm and dry.   Neurological:      Mental Status: She is alert and oriented to person, place, and time.      Deep Tendon Reflexes: Reflexes are normal and symmetric.   Psychiatric:         Behavior: Behavior normal.         Thought Content: Thought content normal.         Judgment: Judgment normal.       Lab Results   Component Value Date    TRIG 72 2023    HDL 58 2023     (H) 2023    VLDL 12 2023            HEALTH RISK ASSESSMENT    Smoking Status:  Social History     Tobacco Use   Smoking Status Never   Smokeless Tobacco Never     Alcohol Consumption:  Social History     Substance and Sexual Activity   Alcohol Use No     Fall Risk Screen:    STEADI Fall Risk Assessment was completed, and patient is at LOW risk for falls.Assessment completed on:2023    Depression Screenin/9/2023    10:59 AM   PHQ-2/PHQ-9 Depression Screening   Little Interest or  Pleasure in Doing Things 0-->not at all   Feeling Down, Depressed or Hopeless 0-->not at all   PHQ-9: Brief Depression Severity Measure Score 0       Health Habits and Functional and Cognitive Screenin/9/2023    10:56 AM   Functional & Cognitive Status   Do you have difficulty preparing food and eating? No   Do you have difficulty bathing yourself, getting dressed or grooming yourself? No   Do you have difficulty using the toilet? No   Do you have difficulty moving around from place to place? No   Do you have trouble with steps or getting out of a bed or a chair? No   Current Diet Limited Junk Food   Dental Exam Up to date   Eye Exam Not up to date   Exercise (times per week) 7 times per week   Current Exercises Include Aerobics;Walking;Yard Work   Do you need help using the phone?  No   Are you deaf or do you have serious difficulty hearing?  No   Do you need help with transportation? No   Do you need help shopping? No   Do you need help preparing meals?  No   Do you need help with housework?  No   Do you need help with laundry? No   Do you need help taking your medications? No   Do you need help managing money? No   Do you ever drive or ride in a car without wearing a seat belt? No   Have you felt unusual stress, anger or loneliness in the last month? No   Who do you live with? Spouse   If you need help, do you have trouble finding someone available to you? No   Have you been bothered in the last four weeks by sexual problems? No   Do you have difficulty concentrating, remembering or making decisions? No       Age-appropriate Screening Schedule:  Refer to the list below for future screening recommendations based on patient's age, sex and/or medical conditions. Orders for these recommended tests are listed in the plan section. The patient has been provided with a written plan.    Health Maintenance   Topic Date Due   • DXA SCAN  2023   • ANNUAL WELLNESS VISIT  2023   • INFLUENZA VACCINE   08/01/2023   • MAMMOGRAM  05/03/2024   • LIPID PANEL  05/04/2024   • TDAP/TD VACCINES (2 - Td or Tdap) 11/05/2027   • HEPATITIS C SCREENING  Completed   • COVID-19 Vaccine  Completed   • Pneumococcal Vaccine 65+  Completed   • ZOSTER VACCINE  Addressed   • PAP SMEAR  Discontinued   • COLORECTAL CANCER SCREENING  Discontinued              Assessment & Plan   CMS Preventative Services Quick Reference  Risk Factors Identified During Encounter  None Identified  The above risks/problems have been discussed with the patient.  Follow up actions/plans if indicated are seen below in the Assessment/Plan Section.  Pertinent information has been shared with the patient in the After Visit Summary.    Diagnoses and all orders for this visit:    1. Medicare annual wellness visit, subsequent (Primary)    2. Primary fibromyalgia syndrome  -     LORazepam (ATIVAN) 1 MG tablet; 1-2 tab qhs prn for sleep  Dispense: 180 tablet; Refill: 0  -     gabapentin (NEURONTIN) 300 MG capsule; Take 1 capsule by mouth 3 (Three) Times a Day.  Dispense: 270 capsule; Refill: 0    3. Insomnia, unspecified type  -     LORazepam (ATIVAN) 1 MG tablet; 1-2 tab qhs prn for sleep  Dispense: 180 tablet; Refill: 0    4. Degenerative disc disease, lumbar  -     gabapentin (NEURONTIN) 300 MG capsule; Take 1 capsule by mouth 3 (Three) Times a Day.  Dispense: 270 capsule; Refill: 0    5. Essential hypertension  -     spironolactone (ALDACTONE) 25 MG tablet; Take 2 tablets by mouth Daily.  Dispense: 180 tablet; Refill: 1    Other orders  -     PARoxetine (PAXIL) 10 MG tablet; Take 1 tablet by mouth Every Other Day.  Dispense: 45 tablet; Refill: 1        Follow Up:   Return in about 3 months (around 8/9/2023) for Recheck.     An After Visit Summary and PPPS were made available to the patient.  Information has been scanned into chart. Discussed importance of taking medications as prescribed. Encouraged healthy eating habits with low fat, low salt choices and working  towards maintaining a healthy weight. Recommended regular exercise if able as well as care to prevent falls including avoiding anything on the floor that they could slip or trip on such as throw rugs, making sure they have a bathmat to step onto when their feet are wet and having grab bars and railings where needed.

## 2023-05-09 NOTE — PROGRESS NOTES
Subjective   Angella Baez is a 72 y.o. female.   Chief Complaint   Patient presents with   • CPE     For review and evaluation of management of chronic medical problems. Records reviewed. Any recent labs, xrays reviewed and medications reconciled.  Has been having some vaginal discharge.  Does have a very old pessary.  Vaginitis  This is a chronic problem. The current episode started 1 to 4 weeks ago. The problem occurs constantly. The problem has been unchanged. Associated symptoms include arthralgias and myalgias. Pertinent negatives include no abdominal pain, chills, congestion, coughing, fatigue, fever, joint swelling, nausea, numbness, rash, sore throat, urinary symptoms, vomiting or weakness. Exacerbated by: Wears a pessary. She has tried nothing for the symptoms.   Fibromyalgia  This is a chronic problem. The current episode started more than 1 year ago. The problem occurs constantly. The problem has been unchanged. Associated symptoms include arthralgias and myalgias. Pertinent negatives include no abdominal pain, chills, congestion, coughing, fatigue, fever, joint swelling, nausea, numbness, rash, sore throat, urinary symptoms, vomiting or weakness. The symptoms are aggravated by stress. She has tried NSAIDs and oral narcotics (gabapentin, tramadol) for the symptoms. The treatment provided mild relief.   Back Pain  This is a chronic problem. The current episode started more than 1 year ago. The problem occurs constantly. The problem is unchanged. The pain is present in the lumbar spine and gluteal. The pain radiates to the right foot and left foot. The pain is at a severity of 5/10. The pain is moderate. The pain is worse during the day. The symptoms are aggravated by bending and standing. Stiffness is present in the morning. Pertinent negatives include no abdominal pain, dysuria, fever, numbness or weakness. She has tried analgesics (gabapentin, epidural injection, tramadol) for the symptoms. The  treatment provided moderate relief.   Insomnia  This is a chronic problem. The current episode started more than 1 year ago. The problem occurs constantly. The problem has been unchanged. Associated symptoms include arthralgias and myalgias. Pertinent negatives include no abdominal pain, chills, congestion, coughing, fatigue, fever, joint swelling, nausea, numbness, rash, sore throat, urinary symptoms, vomiting or weakness. The symptoms are aggravated by stress. Treatments tried: lorazepam. The treatment provided significant relief.      The following portions of the patient's history were reviewed and updated as appropriate: allergies, current medications, past social history and problem list.    Outpatient Medications Prior to Visit   Medication Sig Dispense Refill   • docusate sodium (COLACE) 100 MG capsule Take 1 capsule by mouth Every Night. Takes 4 cap nightly     • Magnesium 250 MG tablet Take 1 tablet by mouth.     • meloxicam (MOBIC) 15 MG tablet TAKE 1 TABLET BY MOUTH DAILY AS NEEDED FOR MODERATE PAIN 90 tablet 1   • traMADol (ULTRAM) 50 MG tablet Take 1 tablet by mouth Every 8 (Eight) Hours As Needed for Severe Pain. 252 tablet 0   • gabapentin (NEURONTIN) 300 MG capsule Take 1 capsule by mouth 3 (Three) Times a Day. 252 capsule 0   • LORazepam (ATIVAN) 1 MG tablet 1-2 tab qhs prn for sleep 168 tablet 0   • PARoxetine (PAXIL) 10 MG tablet Take 1 tablet by mouth Every Morning.     • spironolactone (ALDACTONE) 25 MG tablet TAKE 2 TABLETS BY MOUTH DAILY 180 tablet 1     No facility-administered medications prior to visit.       Review of Systems   Constitutional: Negative for chills, fatigue and fever.   HENT: Negative for congestion and sore throat.    Respiratory: Negative for cough.    Gastrointestinal: Negative for abdominal pain, nausea and vomiting.   Genitourinary: Negative for dysuria.   Musculoskeletal: Positive for arthralgias, back pain and myalgias. Negative for joint swelling.   Skin: Negative  "for rash.   Neurological: Negative for weakness and numbness.   Psychiatric/Behavioral: The patient has insomnia.      I have reviewed 12 systems with patient. Findings were negative except what is noted below and/or in history of present illness.     Objective   Visit Vitals  /78 (BP Location: Right arm)   Pulse 84   Ht 166.4 cm (65.5\")   Wt 59.5 kg (131 lb 1.6 oz)   LMP  (LMP Unknown)   SpO2 98%   BMI 21.48 kg/m²     Physical Exam  Vitals and nursing note reviewed.   Constitutional:       General: She is not in acute distress.     Appearance: She is well-developed.   HENT:      Head: Normocephalic and atraumatic.      Nose: Nose normal.   Eyes:      General:         Right eye: No discharge.         Left eye: No discharge.      Conjunctiva/sclera: Conjunctivae normal.      Pupils: Pupils are equal, round, and reactive to light.   Neck:      Thyroid: No thyromegaly.      Trachea: No tracheal deviation.   Cardiovascular:      Rate and Rhythm: Normal rate and regular rhythm.      Heart sounds: Normal heart sounds. No murmur heard.  Pulmonary:      Effort: Pulmonary effort is normal. No respiratory distress.      Breath sounds: Normal breath sounds. No wheezing or rales.   Abdominal:      General: Bowel sounds are normal. There is no distension.      Palpations: Abdomen is soft. There is no mass.      Tenderness: There is no abdominal tenderness.      Hernia: No hernia is present.   Genitourinary:     Comments: Yellowish vaginal discharge present  Musculoskeletal:         General: No deformity. Normal range of motion.        Back:       Comments: General muscle tenderness   Lymphadenopathy:      Cervical: No cervical adenopathy.   Skin:     General: Skin is warm and dry.   Neurological:      Mental Status: She is alert and oriented to person, place, and time.      Deep Tendon Reflexes: Reflexes are normal and symmetric.   Psychiatric:         Behavior: Behavior normal.         Thought Content: Thought content " normal.         Judgment: Judgment normal.       Notes brought forward are reviewed and updated if indicated.     Assessment & Plan   Problems Addressed this Visit        Cardiac and Vasculature    Essential hypertension (Chronic)    Relevant Medications    spironolactone (ALDACTONE) 25 MG tablet       Musculoskeletal and Injuries    Primary fibromyalgia syndrome (Chronic)    Relevant Medications    LORazepam (ATIVAN) 1 MG tablet    gabapentin (NEURONTIN) 300 MG capsule       Neuro    Degenerative disc disease, lumbar (Chronic)    Relevant Medications    gabapentin (NEURONTIN) 300 MG capsule       Sleep    Insomnia (Chronic)    Relevant Medications    LORazepam (ATIVAN) 1 MG tablet   Other Visit Diagnoses     Medicare annual wellness visit, subsequent    -  Primary    Vaginal discharge        Relevant Orders    Gardnerella vaginalis, Trichomonas vaginalis, Candida albicans, DNA - Swab, Vagina (Completed)    Vaginal prolapse        Relevant Orders    Ambulatory Referral to Obstetrics / Gynecology    Pessary maintenance        Relevant Orders    Ambulatory Referral to Obstetrics / Gynecology    Screening for cervical cancer        Relevant Orders    Ambulatory Referral to Obstetrics / Gynecology      Diagnoses       Codes Comments    Medicare annual wellness visit, subsequent    -  Primary ICD-10-CM: Z00.00  ICD-9-CM: V70.0     Primary fibromyalgia syndrome     ICD-10-CM: M79.7  ICD-9-CM: 729.1     Insomnia, unspecified type     ICD-10-CM: G47.00  ICD-9-CM: 780.52     Degenerative disc disease, lumbar     ICD-10-CM: M51.36  ICD-9-CM: 722.52     Essential hypertension     ICD-10-CM: I10  ICD-9-CM: 401.9     Vaginal discharge     ICD-10-CM: N89.8  ICD-9-CM: 623.5     Vaginal prolapse     ICD-10-CM: N81.10  ICD-9-CM: 618.00     Pessary maintenance     ICD-10-CM: Z46.89  ICD-9-CM: V53.99     Screening for cervical cancer     ICD-10-CM: Z12.4  ICD-9-CM: V76.2           Continue current medications.  Vaginal swab returned  showing bacterial vaginosis, prescription for clindamycin vaginal cream sent.  Advise she should see gynecology for pessary maintenance as she has had hers for a very long time.  She also has not had a Pap smear for some time.      [place] reviewed and appropriate. Not recommended to drive or operate heavy equipment while taking potentially sedating meds.  Patient understands the risks associated with this controlled medication, including tolerance and addiction. They also agree to obtain this medication only from me, and not from a another provider, unless that provider is covering for me in my absence. They also agree to be compliant in dosing, and not self adjust the dose of medication.  A signed controlled substance agreement is on file, and they have received a controlled substance education sheet at this or a previous visit. They have also signed a consent for treatment with a controlled substance as per [facility] policy.      New Medications Ordered This Visit   Medications   • LORazepam (ATIVAN) 1 MG tablet     Si-2 tab qhs prn for sleep     Dispense:  180 tablet     Refill:  0   • gabapentin (NEURONTIN) 300 MG capsule     Sig: Take 1 capsule by mouth 3 (Three) Times a Day.     Dispense:  270 capsule     Refill:  0     Will call for Prescription   • spironolactone (ALDACTONE) 25 MG tablet     Sig: Take 2 tablets by mouth Daily.     Dispense:  180 tablet     Refill:  1   • PARoxetine (PAXIL) 10 MG tablet     Sig: Take 1 tablet by mouth Every Other Day.     Dispense:  45 tablet     Refill:  1   • clindamycin (CLEOCIN) 2 % vaginal cream     Sig: Insert 1 applicator into the vagina Every Night for 7 doses.     Dispense:  7 g     Refill:  0     Return in about 3 months (around 2023) for Recheck.        This document has been electronically signed by Anay Thomson MD on May 9, 2023 17:11 CDT

## 2023-05-11 DIAGNOSIS — M79.7 PRIMARY FIBROMYALGIA SYNDROME: Chronic | ICD-10-CM

## 2023-05-11 DIAGNOSIS — G47.00 INSOMNIA, UNSPECIFIED TYPE: Chronic | ICD-10-CM

## 2023-05-12 RX ORDER — LORAZEPAM 1 MG/1
TABLET ORAL
Qty: 180 TABLET | Refills: 0 | Status: SHIPPED | OUTPATIENT
Start: 2023-05-12

## 2023-05-13 DIAGNOSIS — G47.00 INSOMNIA, UNSPECIFIED TYPE: Chronic | ICD-10-CM

## 2023-05-13 DIAGNOSIS — M79.7 PRIMARY FIBROMYALGIA SYNDROME: Chronic | ICD-10-CM

## 2023-05-15 RX ORDER — LORAZEPAM 1 MG/1
TABLET ORAL
Qty: 135 TABLET | Refills: 0 | OUTPATIENT
Start: 2023-05-15

## 2023-05-15 NOTE — TELEPHONE ENCOUNTER
Duplicate Refill Request.   Refill sent 05/12/2023  #[id number], NR to [facility]  Information relayed to the Pharmacy, should be on file

## 2023-05-16 ENCOUNTER — TELEPHONE (OUTPATIENT)
Dept: FAMILY MEDICINE CLINIC | Facility: CLINIC | Age: 73
End: 2023-05-16
Payer: MEDICARE

## 2023-05-16 NOTE — TELEPHONE ENCOUNTER
----- Message from Anay Thomson MD sent at 5/16/2023 12:57 PM CDT -----  Call and tell still has osteoporosis although has not gotten a lot worse.  Would she like to be seen at the [facility].  If not continue with weightbearing exercise and calcium plus D.

## 2023-05-16 NOTE — TELEPHONE ENCOUNTER
Per Dr. Thomson, Ms. Baez  has been called with recent DEXA Bone Density Scan results & recommendations.  Continue current medications and follow-up as planned or sooner if any problems.     Ms. Baez states she has been to the [facility] in the past.  She states there are to many side effects with the medications recommended and she is not going to take them.   She will continue her exercising and the over the Counter Calcium & Vitamin D.

## 2023-06-06 ENCOUNTER — OFFICE VISIT (OUTPATIENT)
Dept: OBSTETRICS AND GYNECOLOGY | Facility: CLINIC | Age: 73
End: 2023-06-06
Payer: MEDICARE

## 2023-06-06 VITALS
WEIGHT: 133 LBS | DIASTOLIC BLOOD PRESSURE: 88 MMHG | BODY MASS INDEX: 21.38 KG/M2 | HEIGHT: 66 IN | SYSTOLIC BLOOD PRESSURE: 160 MMHG

## 2023-06-06 DIAGNOSIS — B37.31 VAGINAL CANDIDIASIS: ICD-10-CM

## 2023-06-06 DIAGNOSIS — Z46.89 PESSARY MAINTENANCE: Primary | ICD-10-CM

## 2023-06-06 PROCEDURE — 99212 OFFICE O/P EST SF 10 MIN: CPT | Performed by: NURSE PRACTITIONER

## 2023-06-06 PROCEDURE — 3077F SYST BP >= 140 MM HG: CPT | Performed by: NURSE PRACTITIONER

## 2023-06-06 PROCEDURE — 1159F MED LIST DOCD IN RCRD: CPT | Performed by: NURSE PRACTITIONER

## 2023-06-06 PROCEDURE — 3079F DIAST BP 80-89 MM HG: CPT | Performed by: NURSE PRACTITIONER

## 2023-06-06 PROCEDURE — A4561 PESSARY RUBBER, ANY TYPE: HCPCS | Performed by: NURSE PRACTITIONER

## 2023-06-06 PROCEDURE — 1160F RVW MEDS BY RX/DR IN RCRD: CPT | Performed by: NURSE PRACTITIONER

## 2023-06-06 RX ORDER — FLUCONAZOLE 150 MG/1
TABLET ORAL
Qty: 2 TABLET | Refills: 0 | Status: SHIPPED | OUTPATIENT
Start: 2023-06-06

## 2023-06-06 NOTE — PROGRESS NOTES
Subjective   Angella Baez is a 72 y.o. pessary maintenance    History of Present Illness  Pt presents with desire for new pessary. She has been using foldable ring pessary #5 with urethral bar for the past 10 years without issue.  She removes and replaces the pessary every four days or so.  She was recently treated for bacterial vaginosis with metronidazole.  She has noticed an increase in vaginal discharge over the past few days since completing antibiotic.    Gynecologic Exam  The patient's primary symptoms include vaginal discharge. The patient's pertinent negatives include no genital itching, genital lesions, genital odor, genital rash, pelvic pain or vaginal bleeding. This is a new problem. The current episode started in the past 7 days. Pertinent negatives include no dysuria, flank pain, frequency, hematuria or urgency. The vaginal discharge was thin and white. She is postmenopausal.     The following portions of the patient's history were reviewed and updated as appropriate: allergies, current medications, past family history, past medical history, past social history, past surgical history, and problem list.    Review of Systems   Genitourinary:  Positive for vaginal discharge. Negative for decreased urine volume, difficulty urinating, dysuria, enuresis, flank pain, frequency, genital sores, hematuria, pelvic pain, urgency, vaginal bleeding and vaginal pain.       Objective   Physical Exam  Exam conducted with a chaperone present.   Genitourinary:     General: Normal vulva.      Exam position: Lithotomy position.      Labia:         Right: No rash, tenderness, lesion or injury.         Left: No rash, tenderness, lesion or injury.       Urethra: No prolapse, urethral pain, urethral swelling or urethral lesion.      Vagina: Vaginal discharge, erythema and prolapsed vaginal walls present.      Comments: Vaginal atrophy present throughout consistent with age. Cystocele present- unable to grade r/t  redundant prolapsed vaginal tissue.        Assessment & Plan   Diagnoses and all orders for this visit:    1. Pessary maintenance (Primary)    2. Vaginal candidiasis  -     fluconazole (Diflucan) 150 MG tablet; Take 1 tablet by mouth today and repeat in 3 days.  Dispense: 2 tablet; Refill: 0      Pt provided with new rubber pessary #5 foldable ring with urethral bar.      Remove and clean pessary every 6-12 weeks.  Replace pessary every 1-2 years or after vaginal infection.      RTC as needed.

## 2023-06-19 DIAGNOSIS — M51.36 DEGENERATIVE DISC DISEASE, LUMBAR: Chronic | ICD-10-CM

## 2023-06-19 DIAGNOSIS — M79.7 PRIMARY FIBROMYALGIA SYNDROME: Chronic | ICD-10-CM

## 2023-06-19 RX ORDER — GABAPENTIN 300 MG/1
300 CAPSULE ORAL 3 TIMES DAILY
Qty: 270 CAPSULE | Refills: 0 | Status: CANCELLED | OUTPATIENT
Start: 2023-06-19

## 2023-06-19 NOTE — TELEPHONE ENCOUNTER
Last Rx 05/09/2023  #[phone], NR  I have called the pharmacy and the Rx was on hold from 05/09/2023.  They will get the refill ready and let the patient know.     UPCOMING APPTS  With Family Medicine (Anay Thomson MD)  08/10/2023 at 10:45 AM  LAST OFFICE VISIT - THIS DEPT  5/9/2023 Anay Thomson MD

## 2023-08-10 ENCOUNTER — OFFICE VISIT (OUTPATIENT)
Dept: FAMILY MEDICINE CLINIC | Facility: CLINIC | Age: 73
End: 2023-08-10
Payer: MEDICARE

## 2023-08-10 VITALS
SYSTOLIC BLOOD PRESSURE: 152 MMHG | OXYGEN SATURATION: 97 % | BODY MASS INDEX: 21.65 KG/M2 | HEIGHT: 66 IN | WEIGHT: 134.7 LBS | DIASTOLIC BLOOD PRESSURE: 60 MMHG | HEART RATE: 74 BPM

## 2023-08-10 DIAGNOSIS — M79.7 PRIMARY FIBROMYALGIA SYNDROME: Chronic | ICD-10-CM

## 2023-08-10 DIAGNOSIS — I10 ESSENTIAL HYPERTENSION: Primary | Chronic | ICD-10-CM

## 2023-08-10 DIAGNOSIS — M51.36 DEGENERATIVE DISC DISEASE, LUMBAR: Chronic | ICD-10-CM

## 2023-08-10 DIAGNOSIS — G47.00 INSOMNIA, UNSPECIFIED TYPE: Chronic | ICD-10-CM

## 2023-08-10 PROCEDURE — 1159F MED LIST DOCD IN RCRD: CPT | Performed by: GENERAL PRACTICE

## 2023-08-10 PROCEDURE — 3078F DIAST BP <80 MM HG: CPT | Performed by: GENERAL PRACTICE

## 2023-08-10 PROCEDURE — 1160F RVW MEDS BY RX/DR IN RCRD: CPT | Performed by: GENERAL PRACTICE

## 2023-08-10 PROCEDURE — 99214 OFFICE O/P EST MOD 30 MIN: CPT | Performed by: GENERAL PRACTICE

## 2023-08-10 PROCEDURE — 3077F SYST BP >= 140 MM HG: CPT | Performed by: GENERAL PRACTICE

## 2023-08-10 RX ORDER — LORAZEPAM 1 MG/1
TABLET ORAL
Qty: 180 TABLET | Refills: 0 | Status: SHIPPED | OUTPATIENT
Start: 2023-08-10

## 2023-08-10 RX ORDER — GABAPENTIN 300 MG/1
300 CAPSULE ORAL 3 TIMES DAILY
Qty: 270 CAPSULE | Refills: 0 | Status: SHIPPED | OUTPATIENT
Start: 2023-08-10

## 2023-08-10 RX ORDER — SPIRONOLACTONE 25 MG/1
37.5 TABLET ORAL DAILY
COMMUNITY

## 2023-08-10 NOTE — PROGRESS NOTES
Subjective   Angella Baez is a 72 y.o. female.   Chief Complaint   Patient presents with    Fibromyalgia    Med Refill     For review and evaluation of management of chronic medical problems. Records reviewed. Any recent labs, xrays reviewed and medications reconciled. Depression and anxiety stable.  Blood pressure has been running high in the mornings but comes down in the afternoon.    Fibromyalgia  This is a chronic problem. The current episode started more than 1 year ago. The problem occurs constantly. The problem has been unchanged. Associated symptoms include arthralgias and myalgias. Pertinent negatives include no abdominal pain, chills, congestion, coughing, fatigue, fever, headaches, joint swelling, nausea, neck pain, numbness, rash, sore throat, vomiting or weakness. The symptoms are aggravated by stress. She has tried NSAIDs and oral narcotics (gabapentin, tramadol) for the symptoms. The treatment provided mild relief.   Back Pain  This is a chronic problem. The current episode started more than 1 year ago. The problem occurs constantly. The problem is unchanged. The pain is present in the lumbar spine and gluteal. The pain radiates to the right foot and left foot. The pain is at a severity of 4/10. The pain is moderate. The pain is Worse during the day. The symptoms are aggravated by bending and standing. Stiffness is present In the morning. Pertinent negatives include no abdominal pain, dysuria, fever, headaches, numbness or weakness. She has tried analgesics (gabapentin, epidural injection, tramadol) for the symptoms. The treatment provided moderate relief.   Insomnia  This is a chronic problem. The current episode started more than 1 year ago. The problem occurs constantly. The problem has been unchanged. Associated symptoms include arthralgias and myalgias. Pertinent negatives include no abdominal pain, chills, congestion, coughing, fatigue, fever, headaches, joint swelling, nausea, neck  pain, numbness, rash, sore throat, vomiting or weakness. The symptoms are aggravated by stress. Treatments tried: lorazepam. The treatment provided significant relief.   Hypertension  This is a chronic problem. The current episode started more than 1 year ago. The problem has been gradually worsening since onset. Pertinent negatives include no headaches, neck pain or palpitations. There are no associated agents to hypertension. Risk factors for coronary artery disease include stress (pain). Past treatments include diuretics. Current antihypertension treatment includes diuretics. The current treatment provides moderate improvement. There are no compliance problems.       The following portions of the patient's history were reviewed and updated as appropriate: allergies, current medications, past social history and problem list.    Outpatient Medications Prior to Visit   Medication Sig Dispense Refill    docusate sodium (COLACE) 100 MG capsule Take 1 capsule by mouth Every Night. Takes 4 cap nightly      Magnesium 250 MG tablet Take 1 tablet by mouth.      meloxicam (MOBIC) 15 MG tablet TAKE 1 TABLET BY MOUTH DAILY AS NEEDED FOR MODERATE PAIN 90 tablet 1    PARoxetine (PAXIL) 10 MG tablet Take 1 tablet by mouth Every Other Day. 45 tablet 1    traMADol (ULTRAM) 50 MG tablet Take 1 tablet by mouth Every 8 (Eight) Hours As Needed for Severe Pain. 252 tablet 0    gabapentin (NEURONTIN) 300 MG capsule Take 1 capsule by mouth 3 (Three) Times a Day. 270 capsule 0    LORazepam (ATIVAN) 1 MG tablet 1-2 tab qhs prn for sleep 180 tablet 0    spironolactone (ALDACTONE) 25 MG tablet Take 2 tablets by mouth Daily. 180 tablet 1    spironolactone (ALDACTONE) 25 MG tablet Take 1.5 tablets by mouth Daily.      fluconazole (DIFLUCAN) 150 MG tablet Take 1 tablet by mouth Daily. Take one tablet by mouth. Repeat in 3 days if needed. (Patient not taking: Reported on 8/10/2023) 2 tablet 0     No facility-administered medications prior to  "visit.       Review of Systems   Constitutional:  Negative for chills, fatigue and fever.   HENT:  Negative for congestion and sore throat.    Respiratory:  Negative for cough.    Cardiovascular:  Negative for palpitations.   Gastrointestinal:  Negative for abdominal pain, nausea and vomiting.   Genitourinary:  Negative for dysuria.   Musculoskeletal:  Positive for arthralgias, back pain and myalgias. Negative for joint swelling and neck pain.   Skin:  Negative for rash.   Neurological:  Negative for weakness, numbness and headaches.   Psychiatric/Behavioral:  The patient has insomnia.    I have reviewed 12 systems with patient. Findings were negative except what is noted below and/or in history of present illness.     Objective   Visit Vitals  /60   Pulse 74   Ht 166.4 cm (65.5\")   Wt 61.1 kg (134 lb 11.2 oz)   LMP  (LMP Unknown)   SpO2 97%   BMI 22.07 kg/mý     Physical Exam  Vitals and nursing note reviewed.   Constitutional:       General: She is not in acute distress.     Appearance: She is well-developed.   HENT:      Head: Normocephalic and atraumatic.      Nose: Nose normal.   Eyes:      General:         Right eye: No discharge.         Left eye: No discharge.      Conjunctiva/sclera: Conjunctivae normal.      Pupils: Pupils are equal, round, and reactive to light.   Neck:      Thyroid: No thyromegaly.   Cardiovascular:      Rate and Rhythm: Normal rate and regular rhythm.      Heart sounds: Normal heart sounds.   Pulmonary:      Effort: Pulmonary effort is normal.      Breath sounds: Normal breath sounds.   Lymphadenopathy:      Cervical: No cervical adenopathy.   Skin:     General: Skin is warm and dry.   Neurological:      Mental Status: She is alert and oriented to person, place, and time.     Notes brought forward are reviewed and updated if indicated.     Assessment & Plan   Problems Addressed this Visit          Cardiac and Vasculature    Essential hypertension - Primary (Chronic)    Relevant " Medications    spironolactone (ALDACTONE) 25 MG tablet       Musculoskeletal and Injuries    Primary fibromyalgia syndrome (Chronic)    Relevant Medications    LORazepam (ATIVAN) 1 MG tablet    gabapentin (NEURONTIN) 300 MG capsule       Neuro    Degenerative disc disease, lumbar (Chronic)    Relevant Medications    gabapentin (NEURONTIN) 300 MG capsule       Sleep    Insomnia (Chronic)    Relevant Medications    LORazepam (ATIVAN) 1 MG tablet     Diagnoses         Codes Comments    Essential hypertension    -  Primary ICD-10-CM: I10  ICD-9-CM: 401.9     Primary fibromyalgia syndrome     ICD-10-CM: M79.7  ICD-9-CM: 729.1     Insomnia, unspecified type     ICD-10-CM: G47.00  ICD-9-CM: 780.52     Degenerative disc disease, lumbar     ICD-10-CM: M51.36  ICD-9-CM: 722.52            Try taking half tab of spironolactone in the morning and a whole 1 in the evening.  Monitor blood pressure.Continue current medications. [place] reviewed and appropriate. Not recommended to drive or operate heavy equipment while taking potentially sedating meds.  Patient understands the risks associated with this controlled medication, including tolerance and addiction. They also agree to obtain this medication only from me, and not from a another provider, unless that provider is covering for me in my absence. They also agree to be compliant in dosing, and not self adjust the dose of medication.  A signed controlled substance agreement is on file, and they have received a controlled substance education sheet at this or a previous visit. They have also signed a consent for treatment with a controlled substance as per [facility] policy.      New Medications Ordered This Visit   Medications    LORazepam (ATIVAN) 1 MG tablet     Si-2 tab qhs prn for sleep     Dispense:  180 tablet     Refill:  0     Will call for refill    gabapentin (NEURONTIN) 300 MG capsule     Sig: Take 1 capsule by mouth 3 (Three) Times a Day.     Dispense:  270  capsule     Refill:  0     Will call for refill     Return in about 3 months (around 11/14/2023) for Recheck.        This document has been electronically signed by Anay Thomson MD on August 10, 2023 16:41 CDT

## 2023-09-07 DIAGNOSIS — G47.00 INSOMNIA, UNSPECIFIED TYPE: Chronic | ICD-10-CM

## 2023-09-07 DIAGNOSIS — M79.7 PRIMARY FIBROMYALGIA SYNDROME: Chronic | ICD-10-CM

## 2023-09-07 DIAGNOSIS — M51.36 DEGENERATIVE DISC DISEASE, LUMBAR: Chronic | ICD-10-CM

## 2023-09-07 RX ORDER — LORAZEPAM 1 MG/1
TABLET ORAL
Qty: 180 TABLET | Refills: 0 | OUTPATIENT
Start: 2023-09-07

## 2023-09-07 RX ORDER — GABAPENTIN 300 MG/1
300 CAPSULE ORAL 3 TIMES DAILY
Qty: 270 CAPSULE | Refills: 0 | OUTPATIENT
Start: 2023-09-07

## 2023-09-07 NOTE — TELEPHONE ENCOUNTER
Last Rx  Gabapentin 08/10/2023  #270, NR, Should not need refill, 90 day supply sent to [facility]    Ativan  08/10/2023  #180, NR  Should not need refill, 90 day supply sent to [facility]    UPCOMING APPTS  With Family Medicine (Anay Thomson MD)  11/14/2023 at 10:45 AM  LAST OFFICE VISIT - THIS DEPT  8/10/2023 Anay Thosmon MD

## 2023-09-08 DIAGNOSIS — M51.36 DEGENERATIVE DISC DISEASE, LUMBAR: Chronic | ICD-10-CM

## 2023-09-08 DIAGNOSIS — M79.7 PRIMARY FIBROMYALGIA SYNDROME: Chronic | ICD-10-CM

## 2023-09-08 RX ORDER — GABAPENTIN 300 MG/1
CAPSULE ORAL
Qty: 270 CAPSULE | Refills: 0 | OUTPATIENT
Start: 2023-09-08

## 2023-09-08 NOTE — TELEPHONE ENCOUNTER
I have called [facility], the earliest the can refill the Gabapentin is Saturday 09/16/2023 which is a week from tomorrow.     Patient has been notified.     JACKI Watson

## 2023-09-23 DIAGNOSIS — M79.7 PRIMARY FIBROMYALGIA SYNDROME: Chronic | ICD-10-CM

## 2023-09-23 DIAGNOSIS — M51.36 DEGENERATIVE DISC DISEASE, LUMBAR: Chronic | ICD-10-CM

## 2023-09-25 RX ORDER — TRAMADOL HYDROCHLORIDE 50 MG/1
50 TABLET ORAL EVERY 8 HOURS PRN
Qty: 252 TABLET | Refills: 0 | Status: SHIPPED | OUTPATIENT
Start: 2023-09-25

## 2023-09-25 NOTE — TELEPHONE ENCOUNTER
Last Rx 02/07/2023  #252, NR    UPCOMING APPTS  With Family Medicine (Anay Thomson MD)  11/14/2023 at 10:45 AM  LAST OFFICE VISIT - THIS DEPT  8/10/2023 Anay Thomson MD

## 2025-01-27 NOTE — PROGRESS NOTES
Subjective   Angella Baez is a 68 y.o. female.   Chief Complaint   Patient presents with   • Hypertension   • Fibromyalgia   • Insomnia     For review and evaluation of management of chronic medical problems. Pain is controlled with medications. No side effects.   Hypertension   This is a chronic problem. The current episode started more than 1 year ago. The problem is unchanged. The problem is controlled. Pertinent negatives include no chest pain, headaches, neck pain, palpitations or shortness of breath. There are no associated agents to hypertension. Current antihypertension treatment includes calcium channel blockers and diuretics. The current treatment provides moderate improvement. There are no compliance problems.    Fibromyalgia   This is a chronic problem. The current episode started more than 1 year ago. The problem occurs constantly. The problem has been unchanged. Associated symptoms include myalgias. Pertinent negatives include no abdominal pain, arthralgias, chest pain, chills, congestion, coughing, fatigue, fever, headaches, joint swelling, nausea, neck pain, numbness, rash, sore throat, vomiting or weakness. The symptoms are aggravated by stress. Treatments tried: gabapentin. The treatment provided moderate relief.   Insomnia   This is a chronic problem. The current episode started more than 1 year ago. The problem occurs constantly. The problem has been unchanged. Associated symptoms include myalgias. Pertinent negatives include no abdominal pain, arthralgias, chest pain, chills, congestion, coughing, fatigue, fever, headaches, joint swelling, nausea, neck pain, numbness, rash, sore throat, vomiting or weakness. The symptoms are aggravated by stress. Treatments tried: lorazepam. The treatment provided significant relief.   Back Pain   This is a chronic problem. The current episode started more than 1 year ago. The problem occurs constantly. The problem is unchanged. The pain is present in  the lumbar spine and gluteal. The pain radiates to the right foot and left foot. The pain is at a severity of 2/10. The pain is moderate. The pain is worse during the day. The symptoms are aggravated by bending and standing. Stiffness is present in the morning. Pertinent negatives include no abdominal pain, chest pain, dysuria, fever, headaches, numbness or weakness. She has tried analgesics (gabapentin, epidural injection) for the symptoms. The treatment provided moderate relief.      The following portions of the patient's history were reviewed and updated as appropriate: allergies, current medications, past social history and problem list.    Outpatient Medications Prior to Visit   Medication Sig Dispense Refill   • desoximetasone (TOPICORT) 0.25 % cream      • diltiaZEM (CARDIZEM) 60 MG tablet Take 30 mg by mouth Once.     • docusate sodium (COLACE) 100 MG capsule Take 100 mg by mouth every night. Takes 4 cap nightly     • gabapentin (NEURONTIN) 300 MG capsule TAKE ONE CAPSULE BY MOUTH TWICE A DAY AND TAKE TWO CAPSULES EVERY NIGHT AT BEDTIME 270 capsule 0   • Magnesium 250 MG tablet Take 4 tablets by mouth.     • spironolactone (ALDACTONE) 25 MG tablet Take 37.5 mg by mouth Daily.     • diltiaZEM (CARDIZEM) 60 MG tablet Take 1 tablet by mouth 2 (Two) Times a Day. 180 tablet 3   • LORazepam (ATIVAN) 1 MG tablet Take 1 tablet by mouth Every Night. May repeat x 1 prn 120 tablet 0   • spironolactone (ALDACTONE) 25 MG tablet Take 2 tablets by mouth Daily. 180 tablet 3   • traMADol (ULTRAM) 50 MG tablet Take 1 tablet by mouth 2 (Two) Times a Day As Needed for Moderate Pain . 180 tablet 0     No facility-administered medications prior to visit.        Review of Systems   Constitutional: Negative for chills, fatigue and fever.   HENT: Negative for congestion and sore throat.    Respiratory: Negative for cough and shortness of breath.    Cardiovascular: Negative for chest pain and palpitations.   Gastrointestinal:  "Negative for abdominal pain, nausea and vomiting.   Genitourinary: Negative for dysuria.   Musculoskeletal: Positive for back pain and myalgias. Negative for arthralgias, joint swelling and neck pain.   Skin: Negative for rash.   Neurological: Negative for weakness, numbness and headaches.   Psychiatric/Behavioral: The patient has insomnia.        Objective   Visit Vitals  /74 (BP Location: Left arm)   Pulse 83   Ht 166.4 cm (65.5\")   Wt 60.1 kg (132 lb 9.6 oz)   SpO2 99%   BMI 21.73 kg/m²     Physical Exam   Constitutional: She is oriented to person, place, and time. She appears well-developed and well-nourished. No distress.   HENT:   Head: Normocephalic and atraumatic.   Nose: Nose normal.   Mouth/Throat: Oropharynx is clear and moist.   Eyes: Conjunctivae and EOM are normal. Pupils are equal, round, and reactive to light. Right eye exhibits no discharge. Left eye exhibits no discharge.   Neck: No thyromegaly present.   Cardiovascular: Normal rate, regular rhythm, normal heart sounds and intact distal pulses.   Pulmonary/Chest: Effort normal and breath sounds normal.   Musculoskeletal:        Lumbar back: She exhibits tenderness.   Multiple tender points over back and chest   Lymphadenopathy:     She has no cervical adenopathy.   Neurological: She is alert and oriented to person, place, and time.   Skin: Skin is warm and dry.   Psychiatric: She has a normal mood and affect.   Nursing note and vitals reviewed.      Assessment/Plan   Problem List Items Addressed This Visit        Cardiovascular and Mediastinum    Essential hypertension - Primary (Chronic)    Relevant Medications    diltiaZEM (CARDIZEM) 60 MG tablet    spironolactone (ALDACTONE) 25 MG tablet    Other Relevant Orders    Basic Metabolic Panel       Nervous and Auditory    Primary fibromyalgia syndrome (Chronic)    Relevant Medications    traMADol (ULTRAM) 50 MG tablet       Other    Insomnia (Chronic)    Relevant Medications    LORazepam " (ATIVAN) 1 MG tablet          Will notify regarding results. Continue current treatment. [place] reviewed and appropriate. Not recommended to drive or operate heavy equipment while taking potentially sedating meds.  Patient understands the risks associated with this controlled medication, including tolerance and addiction. They also agree to obtain this medication only from me, and not from a another provider, unless that provider is covering for me in my absence. They also agree to be compliant in dosing, and not self adjust the dose of medication.  A signed controlled substance agreement is on file, and they have received a controlled substance education sheet at this or a previous visit. They have also signed a consent for treatment with a controlled substance as per [facility] policy.      New Medications Ordered This Visit   Medications   • traMADol (ULTRAM) 50 MG tablet     Sig: Take 1 tablet by mouth 2 (Two) Times a Day As Needed for Moderate Pain .     Dispense:  180 tablet     Refill:  0   • LORazepam (ATIVAN) 1 MG tablet     Sig: Take 1 tablet by mouth Every Night. May repeat x 1 prn     Dispense:  120 tablet     Refill:  0     Return in about 3 months (around 9/7/2019) for Recheck.   Detail Level: Simple Render Risk Assessment In Note?: no Comment: This is stable and chronic. Recommend monitoring for change. Recommend biopsy with change.